# Patient Record
Sex: MALE | Race: WHITE | NOT HISPANIC OR LATINO | Employment: OTHER | URBAN - METROPOLITAN AREA
[De-identification: names, ages, dates, MRNs, and addresses within clinical notes are randomized per-mention and may not be internally consistent; named-entity substitution may affect disease eponyms.]

---

## 2017-01-01 ENCOUNTER — APPOINTMENT (INPATIENT)
Dept: CT IMAGING | Facility: HOSPITAL | Age: 77
DRG: 283 | End: 2017-01-01
Payer: MEDICARE

## 2017-01-01 ENCOUNTER — ALLSCRIPTS OFFICE VISIT (OUTPATIENT)
Dept: OTHER | Facility: OTHER | Age: 77
End: 2017-01-01

## 2017-01-01 ENCOUNTER — GENERIC CONVERSION - ENCOUNTER (OUTPATIENT)
Dept: OTHER | Facility: OTHER | Age: 77
End: 2017-01-01

## 2017-01-01 ENCOUNTER — HOSPITAL ENCOUNTER (INPATIENT)
Facility: HOSPITAL | Age: 77
LOS: 3 days | DRG: 283 | End: 2017-10-17
Attending: EMERGENCY MEDICINE | Admitting: FAMILY MEDICINE
Payer: MEDICARE

## 2017-01-01 ENCOUNTER — APPOINTMENT (OUTPATIENT)
Dept: LAB | Facility: CLINIC | Age: 77
End: 2017-01-01
Payer: MEDICARE

## 2017-01-01 ENCOUNTER — APPOINTMENT (INPATIENT)
Dept: NON INVASIVE DIAGNOSTICS | Facility: HOSPITAL | Age: 77
DRG: 283 | End: 2017-01-01
Payer: MEDICARE

## 2017-01-01 ENCOUNTER — APPOINTMENT (INPATIENT)
Dept: RADIOLOGY | Facility: HOSPITAL | Age: 77
DRG: 283 | End: 2017-01-01
Payer: MEDICARE

## 2017-01-01 ENCOUNTER — APPOINTMENT (INPATIENT)
Dept: NON INVASIVE DIAGNOSTICS | Facility: HOSPITAL | Age: 77
DRG: 280 | End: 2017-01-01
Payer: MEDICARE

## 2017-01-01 ENCOUNTER — APPOINTMENT (EMERGENCY)
Dept: RADIOLOGY | Facility: HOSPITAL | Age: 77
DRG: 283 | End: 2017-01-01
Payer: MEDICARE

## 2017-01-01 ENCOUNTER — TRANSCRIBE ORDERS (OUTPATIENT)
Dept: LAB | Facility: CLINIC | Age: 77
End: 2017-01-01

## 2017-01-01 ENCOUNTER — HOSPITAL ENCOUNTER (INPATIENT)
Facility: HOSPITAL | Age: 77
LOS: 4 days | Discharge: HOME | DRG: 280 | End: 2017-04-17
Attending: EMERGENCY MEDICINE | Admitting: INTERNAL MEDICINE
Payer: MEDICARE

## 2017-01-01 ENCOUNTER — APPOINTMENT (EMERGENCY)
Dept: RADIOLOGY | Facility: HOSPITAL | Age: 77
DRG: 280 | End: 2017-01-01
Payer: MEDICARE

## 2017-01-01 ENCOUNTER — GENERIC CONVERSION - ENCOUNTER (OUTPATIENT)
Dept: NEPHROLOGY | Facility: CLINIC | Age: 77
End: 2017-01-01

## 2017-01-01 ENCOUNTER — APPOINTMENT (EMERGENCY)
Dept: CT IMAGING | Facility: HOSPITAL | Age: 77
DRG: 280 | End: 2017-01-01
Payer: MEDICARE

## 2017-01-01 ENCOUNTER — APPOINTMENT (INPATIENT)
Dept: ULTRASOUND IMAGING | Facility: HOSPITAL | Age: 77
DRG: 283 | End: 2017-01-01
Payer: MEDICARE

## 2017-01-01 ENCOUNTER — APPOINTMENT (INPATIENT)
Dept: INTERVENTIONAL RADIOLOGY/VASCULAR | Facility: HOSPITAL | Age: 77
DRG: 283 | End: 2017-01-01
Attending: SURGERY
Payer: MEDICARE

## 2017-01-01 VITALS
OXYGEN SATURATION: 97 % | DIASTOLIC BLOOD PRESSURE: 71 MMHG | RESPIRATION RATE: 18 BRPM | HEIGHT: 65 IN | WEIGHT: 171.08 LBS | SYSTOLIC BLOOD PRESSURE: 153 MMHG | HEART RATE: 66 BPM | BODY MASS INDEX: 28.5 KG/M2 | TEMPERATURE: 97.8 F

## 2017-01-01 VITALS
HEART RATE: 96 BPM | TEMPERATURE: 98.1 F | SYSTOLIC BLOOD PRESSURE: 107 MMHG | RESPIRATION RATE: 25 BRPM | DIASTOLIC BLOOD PRESSURE: 60 MMHG | WEIGHT: 169.09 LBS | HEIGHT: 65 IN | BODY MASS INDEX: 28.17 KG/M2 | OXYGEN SATURATION: 98 %

## 2017-01-01 DIAGNOSIS — N18.4 CHRONIC KIDNEY DISEASE, STAGE IV (SEVERE) (HCC): ICD-10-CM

## 2017-01-01 DIAGNOSIS — I50.9 ACUTE CONGESTIVE HEART FAILURE (HCC): Primary | ICD-10-CM

## 2017-01-01 DIAGNOSIS — R31.9 HEMATURIA: ICD-10-CM

## 2017-01-01 DIAGNOSIS — R17 TOTAL BILIRUBIN, ELEVATED: ICD-10-CM

## 2017-01-01 DIAGNOSIS — E87.5 HYPERKALEMIA: ICD-10-CM

## 2017-01-01 DIAGNOSIS — Z12.5 SPECIAL SCREENING FOR MALIGNANT NEOPLASM OF PROSTATE: ICD-10-CM

## 2017-01-01 DIAGNOSIS — N18.9 CHRONIC KIDNEY DISEASE: ICD-10-CM

## 2017-01-01 DIAGNOSIS — E78.5 HYPERLIPIDEMIA: ICD-10-CM

## 2017-01-01 DIAGNOSIS — I42.9 CARDIOMYOPATHY (HCC): ICD-10-CM

## 2017-01-01 DIAGNOSIS — N40.0 BPH (BENIGN PROSTATIC HYPERPLASIA): ICD-10-CM

## 2017-01-01 DIAGNOSIS — I47.2 VENTRICULAR TACHYCARDIA (HCC): ICD-10-CM

## 2017-01-01 DIAGNOSIS — E11.49 TYPE 2 DIABETES MELLITUS WITH OTHER DIABETIC NEUROLOGICAL COMPLICATION (HCC): ICD-10-CM

## 2017-01-01 DIAGNOSIS — I25.10 ATHEROSCLEROTIC HEART DISEASE OF NATIVE CORONARY ARTERY WITHOUT ANGINA PECTORIS: ICD-10-CM

## 2017-01-01 DIAGNOSIS — M10.9 GOUT: ICD-10-CM

## 2017-01-01 DIAGNOSIS — I10 ESSENTIAL (PRIMARY) HYPERTENSION: ICD-10-CM

## 2017-01-01 DIAGNOSIS — N17.9 AKI (ACUTE KIDNEY INJURY) (HCC): ICD-10-CM

## 2017-01-01 DIAGNOSIS — R09.02 HYPOXEMIA: ICD-10-CM

## 2017-01-01 DIAGNOSIS — E11.9 TYPE 2 DIABETES MELLITUS WITHOUT COMPLICATIONS (HCC): ICD-10-CM

## 2017-01-01 DIAGNOSIS — Z12.5 SPECIAL SCREENING FOR MALIGNANT NEOPLASM OF PROSTATE: Primary | ICD-10-CM

## 2017-01-01 DIAGNOSIS — N18.9 CKD (CHRONIC KIDNEY DISEASE), UNSPECIFIED STAGE: ICD-10-CM

## 2017-01-01 DIAGNOSIS — R79.89 AZOTEMIA: ICD-10-CM

## 2017-01-01 DIAGNOSIS — R53.1 GENERALIZED WEAKNESS: ICD-10-CM

## 2017-01-01 DIAGNOSIS — I46.9 CARDIAC ARREST (HCC): ICD-10-CM

## 2017-01-01 DIAGNOSIS — I50.9 CHF EXACERBATION (HCC): ICD-10-CM

## 2017-01-01 DIAGNOSIS — R77.8 ELEVATED TROPONIN: ICD-10-CM

## 2017-01-01 DIAGNOSIS — M54.50 LOW BACK PAIN: ICD-10-CM

## 2017-01-01 DIAGNOSIS — I50.33 ACUTE ON CHRONIC DIASTOLIC CHF (CONGESTIVE HEART FAILURE) (HCC): ICD-10-CM

## 2017-01-01 DIAGNOSIS — R74.01 TRANSAMINITIS: ICD-10-CM

## 2017-01-01 DIAGNOSIS — I50.43 ACUTE ON CHRONIC COMBINED SYSTOLIC AND DIASTOLIC CONGESTIVE HEART FAILURE (HCC): Primary | ICD-10-CM

## 2017-01-01 DIAGNOSIS — N18.4 STAGE 4 CHRONIC KIDNEY DISEASE (HCC): ICD-10-CM

## 2017-01-01 LAB
ABO GROUP BLD: NORMAL
ALBUMIN SERPL BCP-MCNC: 3 G/DL (ref 3.5–5)
ALBUMIN SERPL BCP-MCNC: 3.2 G/DL (ref 3.5–5)
ALBUMIN SERPL BCP-MCNC: 4.1 G/DL (ref 3.5–5)
ALP SERPL-CCNC: 145 U/L (ref 46–116)
ALP SERPL-CCNC: 155 U/L (ref 46–116)
ALP SERPL-CCNC: 162 U/L (ref 46–116)
ALT SERPL W P-5'-P-CCNC: 49 U/L (ref 12–78)
ALT SERPL W P-5'-P-CCNC: 53 U/L (ref 12–78)
ALT SERPL W P-5'-P-CCNC: 85 U/L (ref 12–78)
ANION GAP SERPL CALCULATED.3IONS-SCNC: 10 MMOL/L (ref 4–13)
ANION GAP SERPL CALCULATED.3IONS-SCNC: 11 MMOL/L (ref 4–13)
ANION GAP SERPL CALCULATED.3IONS-SCNC: 11 MMOL/L (ref 4–13)
ANION GAP SERPL CALCULATED.3IONS-SCNC: 16 MMOL/L (ref 4–13)
ANION GAP SERPL CALCULATED.3IONS-SCNC: 17 MMOL/L (ref 4–13)
ANION GAP SERPL CALCULATED.3IONS-SCNC: 18 MMOL/L (ref 4–13)
ANION GAP SERPL CALCULATED.3IONS-SCNC: 19 MMOL/L (ref 4–13)
ANION GAP SERPL CALCULATED.3IONS-SCNC: 29 MMOL/L (ref 4–13)
ANION GAP SERPL CALCULATED.3IONS-SCNC: 5 MMOL/L (ref 4–13)
ANION GAP SERPL CALCULATED.3IONS-SCNC: 5 MMOL/L (ref 4–13)
ANION GAP SERPL CALCULATED.3IONS-SCNC: 6 MMOL/L (ref 4–13)
ANION GAP SERPL CALCULATED.3IONS-SCNC: 6 MMOL/L (ref 4–13)
ANION GAP SERPL CALCULATED.3IONS-SCNC: 7 MMOL/L (ref 4–13)
ANION GAP SERPL CALCULATED.3IONS-SCNC: 7 MMOL/L (ref 4–13)
ANION GAP SERPL CALCULATED.3IONS-SCNC: 8 MMOL/L (ref 4–13)
ANION GAP SERPL CALCULATED.3IONS-SCNC: 9 MMOL/L (ref 4–13)
ANION GAP SERPL CALCULATED.3IONS-SCNC: 9 MMOL/L (ref 4–13)
APTT PPP: 29 SECONDS (ref 24–36)
APTT PPP: 30 SECONDS (ref 24–36)
APTT PPP: 85 SECONDS (ref 23–35)
ARTERIAL PATENCY WRIST A: ABNORMAL
ARTERIAL PATENCY WRIST A: NO
AST SERPL W P-5'-P-CCNC: 27 U/L (ref 5–45)
AST SERPL W P-5'-P-CCNC: 29 U/L (ref 5–45)
AST SERPL W P-5'-P-CCNC: 89 U/L (ref 5–45)
ATRIAL RATE: 55 BPM
ATRIAL RATE: 82 BPM
ATRIAL RATE: 82 BPM
ATRIAL RATE: 84 BPM
ATRIAL RATE: 85 BPM
ATRIAL RATE: 91 BPM
ATRIAL RATE: 92 BPM
BACTERIA UR CULT: NORMAL
BACTERIA UR QL AUTO: ABNORMAL /HPF
BACTERIA UR QL AUTO: NORMAL /HPF
BACTERIA UR QL AUTO: NORMAL /HPF
BASE EXCESS BLDA CALC-SCNC: -11 MMOL/L (ref -2–3)
BASE EXCESS BLDA CALC-SCNC: -17.7 MMOL/L
BASE EXCESS BLDA CALC-SCNC: -4 MMOL/L (ref -2–3)
BASE EXCESS BLDA CALC-SCNC: -6.8 MMOL/L
BASE EXCESS BLDA CALC-SCNC: -8.3 MMOL/L
BASOPHILS # BLD AUTO: 0.01 THOUSANDS/ΜL (ref 0–0.1)
BASOPHILS # BLD AUTO: 0.02 THOUSANDS/ΜL (ref 0–0.1)
BASOPHILS # BLD AUTO: 0.03 THOUSANDS/ΜL (ref 0–0.1)
BASOPHILS # BLD AUTO: 0.04 THOUSANDS/ΜL (ref 0–0.1)
BASOPHILS # BLD AUTO: 0.04 THOUSANDS/ΜL (ref 0–0.1)
BASOPHILS # BLD AUTO: 0.06 THOUSANDS/ΜL (ref 0–0.1)
BASOPHILS NFR BLD AUTO: 0 % (ref 0–1)
BASOPHILS NFR BLD AUTO: 1 % (ref 0–1)
BILIRUB DIRECT SERPL-MCNC: 0.13 MG/DL (ref 0–0.2)
BILIRUB DIRECT SERPL-MCNC: 2.04 MG/DL (ref 0–0.2)
BILIRUB SERPL-MCNC: 0.52 MG/DL (ref 0.2–1)
BILIRUB SERPL-MCNC: 0.7 MG/DL (ref 0.2–1)
BILIRUB SERPL-MCNC: 3.1 MG/DL (ref 0.2–1)
BILIRUB UR QL STRIP: NEGATIVE
BLD GP AB SCN SERPL QL: NEGATIVE
BODY TEMPERATURE: 100.4 DEGREES FEHRENHEIT
BUN SERPL-MCNC: 102 MG/DL (ref 5–25)
BUN SERPL-MCNC: 26 MG/DL (ref 5–25)
BUN SERPL-MCNC: 29 MG/DL (ref 5–25)
BUN SERPL-MCNC: 39 MG/DL (ref 5–25)
BUN SERPL-MCNC: 44 MG/DL (ref 5–25)
BUN SERPL-MCNC: 50 MG/DL (ref 5–25)
BUN SERPL-MCNC: 51 MG/DL (ref 5–25)
BUN SERPL-MCNC: 56 MG/DL (ref 5–25)
BUN SERPL-MCNC: 57 MG/DL (ref 5–25)
BUN SERPL-MCNC: 60 MG/DL (ref 5–25)
BUN SERPL-MCNC: 65 MG/DL (ref 5–25)
BUN SERPL-MCNC: 71 MG/DL (ref 5–25)
BUN SERPL-MCNC: 73 MG/DL (ref 5–25)
BUN SERPL-MCNC: 79 MG/DL (ref 5–25)
BUN SERPL-MCNC: 82 MG/DL (ref 5–25)
BUN SERPL-MCNC: 85 MG/DL (ref 5–25)
BUN SERPL-MCNC: 86 MG/DL (ref 5–25)
BUN SERPL-MCNC: 92 MG/DL (ref 5–25)
BUN SERPL-MCNC: 92 MG/DL (ref 5–25)
BUN SERPL-MCNC: 93 MG/DL (ref 5–25)
BUN SERPL-MCNC: 99 MG/DL (ref 5–25)
CA-I BLD-SCNC: 1.05 MMOL/L (ref 1.12–1.32)
CA-I BLD-SCNC: 1.05 MMOL/L (ref 1.12–1.32)
CA-I BLD-SCNC: 1.07 MMOL/L (ref 1.12–1.32)
CA-I BLD-SCNC: 1.16 MMOL/L (ref 1.12–1.32)
CA-I BLD-SCNC: 1.18 MMOL/L (ref 1.12–1.32)
CA-I BLD-SCNC: 1.21 MMOL/L (ref 1.12–1.32)
CALCIUM SERPL-MCNC: 7.9 MG/DL (ref 8.3–10.1)
CALCIUM SERPL-MCNC: 8.1 MG/DL (ref 8.3–10.1)
CALCIUM SERPL-MCNC: 8.3 MG/DL (ref 8.3–10.1)
CALCIUM SERPL-MCNC: 8.4 MG/DL (ref 8.3–10.1)
CALCIUM SERPL-MCNC: 8.5 MG/DL (ref 8.3–10.1)
CALCIUM SERPL-MCNC: 8.6 MG/DL (ref 8.3–10.1)
CALCIUM SERPL-MCNC: 8.6 MG/DL (ref 8.3–10.1)
CALCIUM SERPL-MCNC: 8.7 MG/DL (ref 8.3–10.1)
CALCIUM SERPL-MCNC: 8.7 MG/DL (ref 8.3–10.1)
CALCIUM SERPL-MCNC: 8.9 MG/DL (ref 8.3–10.1)
CALCIUM SERPL-MCNC: 9 MG/DL (ref 8.3–10.1)
CALCIUM SERPL-MCNC: 9.1 MG/DL (ref 8.3–10.1)
CALCIUM SERPL-MCNC: 9.2 MG/DL (ref 8.3–10.1)
CALCIUM SERPL-MCNC: 9.3 MG/DL (ref 8.3–10.1)
CALCIUM SERPL-MCNC: 9.4 MG/DL (ref 8.3–10.1)
CHLORIDE SERPL-SCNC: 100 MMOL/L (ref 100–108)
CHLORIDE SERPL-SCNC: 101 MMOL/L (ref 100–108)
CHLORIDE SERPL-SCNC: 102 MMOL/L (ref 100–108)
CHLORIDE SERPL-SCNC: 103 MMOL/L (ref 100–108)
CHLORIDE SERPL-SCNC: 104 MMOL/L (ref 100–108)
CHLORIDE SERPL-SCNC: 105 MMOL/L (ref 100–108)
CHLORIDE SERPL-SCNC: 106 MMOL/L (ref 100–108)
CHLORIDE SERPL-SCNC: 107 MMOL/L (ref 100–108)
CHLORIDE SERPL-SCNC: 109 MMOL/L (ref 100–108)
CHOLEST SERPL-MCNC: 96 MG/DL (ref 50–200)
CHOLEST SERPL-MCNC: 99 MG/DL (ref 50–200)
CK SERPL-CCNC: 52 U/L (ref 39–308)
CLARITY UR: CLEAR
CO2 SERPL-SCNC: 13 MMOL/L (ref 21–32)
CO2 SERPL-SCNC: 19 MMOL/L (ref 21–32)
CO2 SERPL-SCNC: 19 MMOL/L (ref 21–32)
CO2 SERPL-SCNC: 20 MMOL/L (ref 21–32)
CO2 SERPL-SCNC: 21 MMOL/L (ref 21–32)
CO2 SERPL-SCNC: 21 MMOL/L (ref 21–32)
CO2 SERPL-SCNC: 23 MMOL/L (ref 21–32)
CO2 SERPL-SCNC: 25 MMOL/L (ref 21–32)
CO2 SERPL-SCNC: 26 MMOL/L (ref 21–32)
CO2 SERPL-SCNC: 27 MMOL/L (ref 21–32)
CO2 SERPL-SCNC: 28 MMOL/L (ref 21–32)
CO2 SERPL-SCNC: 29 MMOL/L (ref 21–32)
CO2 SERPL-SCNC: 29 MMOL/L (ref 21–32)
CO2 SERPL-SCNC: 30 MMOL/L (ref 21–32)
CO2 SERPL-SCNC: 30 MMOL/L (ref 21–32)
CO2 SERPL-SCNC: 31 MMOL/L (ref 21–32)
CO2 SERPL-SCNC: 35 MMOL/L (ref 21–32)
CO2 SERPL-SCNC: 35 MMOL/L (ref 21–32)
COLOR UR: YELLOW
CREAT SERPL-MCNC: 2.27 MG/DL (ref 0.6–1.3)
CREAT SERPL-MCNC: 2.43 MG/DL (ref 0.6–1.3)
CREAT SERPL-MCNC: 2.48 MG/DL (ref 0.6–1.3)
CREAT SERPL-MCNC: 2.56 MG/DL (ref 0.6–1.3)
CREAT SERPL-MCNC: 2.59 MG/DL (ref 0.6–1.3)
CREAT SERPL-MCNC: 2.64 MG/DL (ref 0.6–1.3)
CREAT SERPL-MCNC: 2.71 MG/DL (ref 0.6–1.3)
CREAT SERPL-MCNC: 2.75 MG/DL (ref 0.6–1.3)
CREAT SERPL-MCNC: 3.18 MG/DL (ref 0.6–1.3)
CREAT SERPL-MCNC: 3.19 MG/DL (ref 0.6–1.3)
CREAT SERPL-MCNC: 3.31 MG/DL (ref 0.6–1.3)
CREAT SERPL-MCNC: 3.34 MG/DL (ref 0.6–1.3)
CREAT SERPL-MCNC: 3.56 MG/DL (ref 0.6–1.3)
CREAT SERPL-MCNC: 3.58 MG/DL (ref 0.6–1.3)
CREAT SERPL-MCNC: 3.59 MG/DL (ref 0.6–1.3)
CREAT SERPL-MCNC: 3.66 MG/DL (ref 0.6–1.3)
CREAT SERPL-MCNC: 3.82 MG/DL (ref 0.6–1.3)
CREAT SERPL-MCNC: 4.04 MG/DL (ref 0.6–1.3)
CREAT SERPL-MCNC: 4.05 MG/DL (ref 0.6–1.3)
CREAT SERPL-MCNC: 4.08 MG/DL (ref 0.6–1.3)
CREAT SERPL-MCNC: 4.45 MG/DL (ref 0.6–1.3)
CREAT SERPL-MCNC: 4.67 MG/DL (ref 0.6–1.3)
CREAT SERPL-MCNC: 4.69 MG/DL (ref 0.6–1.3)
CREAT UR-MCNC: 102 MG/DL
CREAT UR-MCNC: 121 MG/DL
CREAT UR-MCNC: 124 MG/DL
CREAT UR-MCNC: 46.4 MG/DL
CREAT UR-MCNC: 47.4 MG/DL
CREAT UR-MCNC: 60.6 MG/DL
DS:DELIVERY SYSTEM: ABNORMAL
EOSINOPHIL # BLD AUTO: 0 THOUSAND/ΜL (ref 0–0.61)
EOSINOPHIL # BLD AUTO: 0.02 THOUSAND/ΜL (ref 0–0.61)
EOSINOPHIL # BLD AUTO: 0.03 THOUSAND/ΜL (ref 0–0.61)
EOSINOPHIL # BLD AUTO: 0.05 THOUSAND/ΜL (ref 0–0.61)
EOSINOPHIL # BLD AUTO: 0.17 THOUSAND/ΜL (ref 0–0.61)
EOSINOPHIL # BLD AUTO: 0.19 THOUSAND/ΜL (ref 0–0.61)
EOSINOPHIL # BLD AUTO: 0.22 THOUSAND/ΜL (ref 0–0.61)
EOSINOPHIL # BLD AUTO: 0.26 THOUSAND/ΜL (ref 0–0.61)
EOSINOPHIL NFR BLD AUTO: 0 % (ref 0–6)
EOSINOPHIL NFR BLD AUTO: 2 % (ref 0–6)
EOSINOPHIL NFR BLD AUTO: 2 % (ref 0–6)
EOSINOPHIL NFR BLD AUTO: 4 % (ref 0–6)
EOSINOPHIL NFR BLD AUTO: 4 % (ref 0–6)
ERYTHROCYTE [DISTWIDTH] IN BLOOD BY AUTOMATED COUNT: 12.7 % (ref 11.6–15.1)
ERYTHROCYTE [DISTWIDTH] IN BLOOD BY AUTOMATED COUNT: 12.8 % (ref 11.6–15.1)
ERYTHROCYTE [DISTWIDTH] IN BLOOD BY AUTOMATED COUNT: 12.9 % (ref 11.6–15.1)
ERYTHROCYTE [DISTWIDTH] IN BLOOD BY AUTOMATED COUNT: 13 % (ref 11.6–15.1)
ERYTHROCYTE [DISTWIDTH] IN BLOOD BY AUTOMATED COUNT: 13.1 % (ref 11.6–15.1)
ERYTHROCYTE [DISTWIDTH] IN BLOOD BY AUTOMATED COUNT: 13.3 % (ref 11.6–15.1)
EST. AVERAGE GLUCOSE BLD GHB EST-MCNC: 171 MG/DL
FIO2 GAS DIL.REBREATH: 45 L
GFR SERPL CREATININE-BSD FRML MDRD: 11 ML/MIN/1.73SQ M
GFR SERPL CREATININE-BSD FRML MDRD: 11 ML/MIN/1.73SQ M
GFR SERPL CREATININE-BSD FRML MDRD: 12 ML/MIN/1.73SQ M
GFR SERPL CREATININE-BSD FRML MDRD: 13 ML/MIN/1.73SQ M
GFR SERPL CREATININE-BSD FRML MDRD: 14 ML/MIN/1.73SQ M
GFR SERPL CREATININE-BSD FRML MDRD: 15 ML/MIN/1.73SQ M
GFR SERPL CREATININE-BSD FRML MDRD: 17 ML/MIN/1.73SQ M
GFR SERPL CREATININE-BSD FRML MDRD: 18 ML/MIN/1.73SQ M
GFR SERPL CREATININE-BSD FRML MDRD: 18 ML/MIN/1.73SQ M
GFR SERPL CREATININE-BSD FRML MDRD: 19 ML/MIN/1.73SQ M
GFR SERPL CREATININE-BSD FRML MDRD: 21 ML/MIN/1.73SQ M
GFR SERPL CREATININE-BSD FRML MDRD: 22.9 ML/MIN/1.73SQ M
GFR SERPL CREATININE-BSD FRML MDRD: 23.6 ML/MIN/1.73SQ M
GFR SERPL CREATININE-BSD FRML MDRD: 24 ML/MIN/1.73SQ M
GFR SERPL CREATININE-BSD FRML MDRD: 24.2 ML/MIN/1.73SQ M
GFR SERPL CREATININE-BSD FRML MDRD: 24.5 ML/MIN/1.73SQ M
GFR SERPL CREATININE-BSD FRML MDRD: 26 ML/MIN/1.73SQ M
GFR SERPL CREATININE-BSD FRML MDRD: 28.1 ML/MIN/1.73SQ M
GLUCOSE P FAST SERPL-MCNC: 100 MG/DL (ref 65–99)
GLUCOSE P FAST SERPL-MCNC: 67 MG/DL (ref 65–99)
GLUCOSE P FAST SERPL-MCNC: 91 MG/DL (ref 65–99)
GLUCOSE P FAST SERPL-MCNC: 96 MG/DL (ref 65–99)
GLUCOSE SERPL-MCNC: 101 MG/DL (ref 65–140)
GLUCOSE SERPL-MCNC: 102 MG/DL (ref 65–140)
GLUCOSE SERPL-MCNC: 104 MG/DL (ref 65–140)
GLUCOSE SERPL-MCNC: 106 MG/DL (ref 65–140)
GLUCOSE SERPL-MCNC: 112 MG/DL (ref 65–140)
GLUCOSE SERPL-MCNC: 112 MG/DL (ref 65–140)
GLUCOSE SERPL-MCNC: 113 MG/DL (ref 65–140)
GLUCOSE SERPL-MCNC: 114 MG/DL (ref 65–140)
GLUCOSE SERPL-MCNC: 122 MG/DL (ref 65–140)
GLUCOSE SERPL-MCNC: 128 MG/DL (ref 65–140)
GLUCOSE SERPL-MCNC: 129 MG/DL (ref 65–140)
GLUCOSE SERPL-MCNC: 129 MG/DL (ref 65–140)
GLUCOSE SERPL-MCNC: 134 MG/DL (ref 65–140)
GLUCOSE SERPL-MCNC: 140 MG/DL (ref 65–140)
GLUCOSE SERPL-MCNC: 141 MG/DL (ref 65–140)
GLUCOSE SERPL-MCNC: 143 MG/DL (ref 65–140)
GLUCOSE SERPL-MCNC: 145 MG/DL (ref 65–140)
GLUCOSE SERPL-MCNC: 146 MG/DL (ref 65–140)
GLUCOSE SERPL-MCNC: 149 MG/DL (ref 65–140)
GLUCOSE SERPL-MCNC: 149 MG/DL (ref 65–140)
GLUCOSE SERPL-MCNC: 156 MG/DL (ref 65–140)
GLUCOSE SERPL-MCNC: 165 MG/DL (ref 65–140)
GLUCOSE SERPL-MCNC: 172 MG/DL (ref 65–140)
GLUCOSE SERPL-MCNC: 186 MG/DL (ref 65–140)
GLUCOSE SERPL-MCNC: 196 MG/DL (ref 65–140)
GLUCOSE SERPL-MCNC: 197 MG/DL (ref 65–140)
GLUCOSE SERPL-MCNC: 201 MG/DL (ref 65–140)
GLUCOSE SERPL-MCNC: 203 MG/DL (ref 65–140)
GLUCOSE SERPL-MCNC: 216 MG/DL (ref 65–140)
GLUCOSE SERPL-MCNC: 219 MG/DL (ref 65–140)
GLUCOSE SERPL-MCNC: 225 MG/DL (ref 65–140)
GLUCOSE SERPL-MCNC: 236 MG/DL (ref 65–140)
GLUCOSE SERPL-MCNC: 237 MG/DL (ref 65–140)
GLUCOSE SERPL-MCNC: 255 MG/DL (ref 65–140)
GLUCOSE SERPL-MCNC: 256 MG/DL (ref 65–140)
GLUCOSE SERPL-MCNC: 256 MG/DL (ref 65–140)
GLUCOSE SERPL-MCNC: 258 MG/DL (ref 65–140)
GLUCOSE SERPL-MCNC: 259 MG/DL (ref 65–140)
GLUCOSE SERPL-MCNC: 260 MG/DL (ref 65–140)
GLUCOSE SERPL-MCNC: 261 MG/DL (ref 65–140)
GLUCOSE SERPL-MCNC: 262 MG/DL (ref 65–140)
GLUCOSE SERPL-MCNC: 262 MG/DL (ref 65–140)
GLUCOSE SERPL-MCNC: 280 MG/DL (ref 65–140)
GLUCOSE SERPL-MCNC: 286 MG/DL (ref 65–140)
GLUCOSE SERPL-MCNC: 301 MG/DL (ref 65–140)
GLUCOSE SERPL-MCNC: 32 MG/DL (ref 65–140)
GLUCOSE SERPL-MCNC: 324 MG/DL (ref 65–140)
GLUCOSE SERPL-MCNC: 43 MG/DL (ref 65–140)
GLUCOSE SERPL-MCNC: 58 MG/DL (ref 65–140)
GLUCOSE SERPL-MCNC: 64 MG/DL (ref 65–140)
GLUCOSE SERPL-MCNC: 64 MG/DL (ref 65–140)
GLUCOSE SERPL-MCNC: 71 MG/DL (ref 65–140)
GLUCOSE SERPL-MCNC: 73 MG/DL (ref 65–140)
GLUCOSE SERPL-MCNC: 80 MG/DL (ref 65–140)
GLUCOSE SERPL-MCNC: 83 MG/DL (ref 65–140)
GLUCOSE SERPL-MCNC: 86 MG/DL (ref 65–140)
GLUCOSE SERPL-MCNC: 89 MG/DL (ref 65–140)
GLUCOSE SERPL-MCNC: 93 MG/DL (ref 65–140)
GLUCOSE SERPL-MCNC: 95 MG/DL (ref 65–140)
GLUCOSE UR STRIP-MCNC: ABNORMAL MG/DL
GLUCOSE UR STRIP-MCNC: NEGATIVE MG/DL
HBA1C MFR BLD: 7.6 % (ref 4.2–6.3)
HCO3 BLDA-SCNC: 13.3 MMOL/L (ref 22–28)
HCO3 BLDA-SCNC: 17.1 MMOL/L (ref 22–28)
HCO3 BLDA-SCNC: 17.3 MMOL/L (ref 22–28)
HCO3 BLDA-SCNC: 18.1 MMOL/L (ref 22–28)
HCO3 BLDA-SCNC: 20.5 MMOL/L (ref 22–28)
HCT VFR BLD AUTO: 28.9 % (ref 36.5–49.3)
HCT VFR BLD AUTO: 30.5 % (ref 36.5–49.3)
HCT VFR BLD AUTO: 31.9 % (ref 36.5–49.3)
HCT VFR BLD AUTO: 33.8 % (ref 36.5–49.3)
HCT VFR BLD AUTO: 38.2 % (ref 36.5–49.3)
HCT VFR BLD AUTO: 39.4 % (ref 36.5–49.3)
HCT VFR BLD AUTO: 40.4 % (ref 36.5–49.3)
HCT VFR BLD AUTO: 40.6 % (ref 36.5–49.3)
HCT VFR BLD AUTO: 41.3 % (ref 36.5–49.3)
HCT VFR BLD AUTO: 41.5 % (ref 36.5–49.3)
HCT VFR BLD AUTO: 42.3 % (ref 36.5–49.3)
HCT VFR BLD AUTO: 42.4 % (ref 36.5–49.3)
HCT VFR BLD AUTO: 42.9 % (ref 36.5–49.3)
HCT VFR BLD AUTO: 46.6 % (ref 36.5–49.3)
HCT VFR BLD AUTO: 46.7 % (ref 36.5–49.3)
HCT VFR BLD CALC: 29 % (ref 36.5–49.3)
HCT VFR BLD CALC: 39 % (ref 36.5–49.3)
HDLC SERPL-MCNC: 37 MG/DL (ref 40–60)
HDLC SERPL-MCNC: 45 MG/DL (ref 40–60)
HGB BLD-MCNC: 10.3 G/DL (ref 12–17)
HGB BLD-MCNC: 10.4 G/DL (ref 12–17)
HGB BLD-MCNC: 10.4 G/DL (ref 12–17)
HGB BLD-MCNC: 10.8 G/DL (ref 12–17)
HGB BLD-MCNC: 12.8 G/DL (ref 12–17)
HGB BLD-MCNC: 13 G/DL (ref 12–17)
HGB BLD-MCNC: 13.4 G/DL (ref 12–17)
HGB BLD-MCNC: 13.9 G/DL (ref 12–17)
HGB BLD-MCNC: 13.9 G/DL (ref 12–17)
HGB BLD-MCNC: 14 G/DL (ref 12–17)
HGB BLD-MCNC: 14.1 G/DL (ref 12–17)
HGB BLD-MCNC: 15.6 G/DL (ref 12–17)
HGB BLD-MCNC: 15.6 G/DL (ref 12–17)
HGB BLD-MCNC: 9.4 G/DL (ref 12–17)
HGB BLD-MCNC: 9.6 G/DL (ref 12–17)
HGB BLDA-MCNC: 13.3 G/DL (ref 12–17)
HGB BLDA-MCNC: 9.9 G/DL (ref 12–17)
HGB UR QL STRIP.AUTO: ABNORMAL
HGB UR QL STRIP.AUTO: NEGATIVE
HOLD SPECIMEN: NORMAL
HYALINE CASTS #/AREA URNS LPF: ABNORMAL /LPF
HYALINE CASTS #/AREA URNS LPF: NORMAL /LPF
HYALINE CASTS #/AREA URNS LPF: NORMAL /LPF
INR PPP: 1.09 (ref 0.86–1.16)
INR PPP: 1.22 (ref 0.86–1.16)
INR PPP: 1.58 (ref 0.86–1.16)
KETONES UR STRIP-MCNC: NEGATIVE MG/DL
L PNEUMO1 AG UR QL IA.RAPID: NEGATIVE
LACTATE SERPL-SCNC: 1.7 MMOL/L (ref 0.5–2)
LACTATE SERPL-SCNC: 13.6 MMOL/L (ref 0.5–2)
LACTATE SERPL-SCNC: 2.2 MMOL/L (ref 0.5–2)
LACTATE SERPL-SCNC: 2.4 MMOL/L (ref 0.5–2)
LDLC SERPL CALC-MCNC: 34 MG/DL (ref 0–100)
LDLC SERPL CALC-MCNC: 39 MG/DL (ref 0–100)
LEUKOCYTE ESTERASE UR QL STRIP: ABNORMAL
LEUKOCYTE ESTERASE UR QL STRIP: NEGATIVE
LYMPHOCYTES # BLD AUTO: 0.8 THOUSANDS/ΜL (ref 0.6–4.47)
LYMPHOCYTES # BLD AUTO: 0.84 THOUSANDS/ΜL (ref 0.6–4.47)
LYMPHOCYTES # BLD AUTO: 1.04 THOUSANDS/ΜL (ref 0.6–4.47)
LYMPHOCYTES # BLD AUTO: 1.08 THOUSANDS/ΜL (ref 0.6–4.47)
LYMPHOCYTES # BLD AUTO: 1.12 THOUSANDS/ΜL (ref 0.6–4.47)
LYMPHOCYTES # BLD AUTO: 1.14 THOUSANDS/ΜL (ref 0.6–4.47)
LYMPHOCYTES # BLD AUTO: 1.33 THOUSANDS/ΜL (ref 0.6–4.47)
LYMPHOCYTES # BLD AUTO: 3.96 THOUSANDS/ΜL (ref 0.6–4.47)
LYMPHOCYTES NFR BLD AUTO: 10 % (ref 14–44)
LYMPHOCYTES NFR BLD AUTO: 11 % (ref 14–44)
LYMPHOCYTES NFR BLD AUTO: 12 % (ref 14–44)
LYMPHOCYTES NFR BLD AUTO: 14 % (ref 14–44)
LYMPHOCYTES NFR BLD AUTO: 17 % (ref 14–44)
LYMPHOCYTES NFR BLD AUTO: 26 % (ref 14–44)
LYMPHOCYTES NFR BLD AUTO: 9 % (ref 14–44)
LYMPHOCYTES NFR BLD AUTO: 9 % (ref 14–44)
MAGNESIUM SERPL-MCNC: 1.8 MG/DL (ref 1.6–2.6)
MAGNESIUM SERPL-MCNC: 1.9 MG/DL (ref 1.6–2.6)
MAGNESIUM SERPL-MCNC: 2 MG/DL (ref 1.6–2.6)
MAGNESIUM SERPL-MCNC: 2.1 MG/DL (ref 1.6–2.6)
MAGNESIUM SERPL-MCNC: 2.2 MG/DL (ref 1.6–2.6)
MAGNESIUM SERPL-MCNC: 2.2 MG/DL (ref 1.6–2.6)
MAGNESIUM SERPL-MCNC: 2.4 MG/DL (ref 1.6–2.6)
MAGNESIUM SERPL-MCNC: 2.5 MG/DL (ref 1.6–2.6)
MAGNESIUM SERPL-MCNC: 2.6 MG/DL (ref 1.6–2.6)
MAGNESIUM SERPL-MCNC: 2.6 MG/DL (ref 1.6–2.6)
MAGNESIUM SERPL-MCNC: 2.7 MG/DL (ref 1.6–2.6)
MCH RBC QN AUTO: 32.1 PG (ref 26.8–34.3)
MCH RBC QN AUTO: 32.2 PG (ref 26.8–34.3)
MCH RBC QN AUTO: 32.3 PG (ref 26.8–34.3)
MCH RBC QN AUTO: 32.3 PG (ref 26.8–34.3)
MCH RBC QN AUTO: 32.5 PG (ref 26.8–34.3)
MCH RBC QN AUTO: 32.6 PG (ref 26.8–34.3)
MCH RBC QN AUTO: 32.6 PG (ref 26.8–34.3)
MCH RBC QN AUTO: 32.7 PG (ref 26.8–34.3)
MCH RBC QN AUTO: 32.8 PG (ref 26.8–34.3)
MCH RBC QN AUTO: 32.8 PG (ref 26.8–34.3)
MCH RBC QN AUTO: 33 PG (ref 26.8–34.3)
MCH RBC QN AUTO: 33.1 PG (ref 26.8–34.3)
MCH RBC QN AUTO: 33.1 PG (ref 26.8–34.3)
MCHC RBC AUTO-ENTMCNC: 30.8 G/DL (ref 31.4–37.4)
MCHC RBC AUTO-ENTMCNC: 32 G/DL (ref 31.4–37.4)
MCHC RBC AUTO-ENTMCNC: 32.3 G/DL (ref 31.4–37.4)
MCHC RBC AUTO-ENTMCNC: 32.4 G/DL (ref 31.4–37.4)
MCHC RBC AUTO-ENTMCNC: 32.6 G/DL (ref 31.4–37.4)
MCHC RBC AUTO-ENTMCNC: 32.8 G/DL (ref 31.4–37.4)
MCHC RBC AUTO-ENTMCNC: 33 G/DL (ref 31.4–37.4)
MCHC RBC AUTO-ENTMCNC: 33 G/DL (ref 31.4–37.4)
MCHC RBC AUTO-ENTMCNC: 33.2 G/DL (ref 31.4–37.4)
MCHC RBC AUTO-ENTMCNC: 33.2 G/DL (ref 31.4–37.4)
MCHC RBC AUTO-ENTMCNC: 33.3 G/DL (ref 31.4–37.4)
MCHC RBC AUTO-ENTMCNC: 33.4 G/DL (ref 31.4–37.4)
MCHC RBC AUTO-ENTMCNC: 33.5 G/DL (ref 31.4–37.4)
MCV RBC AUTO: 100 FL (ref 82–98)
MCV RBC AUTO: 101 FL (ref 82–98)
MCV RBC AUTO: 106 FL (ref 82–98)
MCV RBC AUTO: 98 FL (ref 82–98)
MCV RBC AUTO: 99 FL (ref 82–98)
MICROALBUMIN UR-MCNC: 37.6 MG/L (ref 0–20)
MICROALBUMIN/CREAT 24H UR: 37 MG/G CREATININE (ref 0–30)
MONOCYTES # BLD AUTO: 0.74 THOUSAND/ΜL (ref 0.17–1.22)
MONOCYTES # BLD AUTO: 0.88 THOUSAND/ΜL (ref 0.17–1.22)
MONOCYTES # BLD AUTO: 0.95 THOUSAND/ΜL (ref 0.17–1.22)
MONOCYTES # BLD AUTO: 1.04 THOUSAND/ΜL (ref 0.17–1.22)
MONOCYTES # BLD AUTO: 1.08 THOUSAND/ΜL (ref 0.17–1.22)
MONOCYTES # BLD AUTO: 1.3 THOUSAND/ΜL (ref 0.17–1.22)
MONOCYTES # BLD AUTO: 1.32 THOUSAND/ΜL (ref 0.17–1.22)
MONOCYTES # BLD AUTO: 1.5 THOUSAND/ΜL (ref 0.17–1.22)
MONOCYTES NFR BLD AUTO: 10 % (ref 4–12)
MONOCYTES NFR BLD AUTO: 10 % (ref 4–12)
MONOCYTES NFR BLD AUTO: 11 % (ref 4–12)
MONOCYTES NFR BLD AUTO: 12 % (ref 4–12)
MONOCYTES NFR BLD AUTO: 14 % (ref 4–12)
MONOCYTES NFR BLD AUTO: 9 % (ref 4–12)
NEUTROPHILS # BLD AUTO: 4.09 THOUSANDS/ΜL (ref 1.85–7.62)
NEUTROPHILS # BLD AUTO: 4.25 THOUSANDS/ΜL (ref 1.85–7.62)
NEUTROPHILS # BLD AUTO: 7.12 THOUSANDS/ΜL (ref 1.85–7.62)
NEUTROPHILS # BLD AUTO: 7.64 THOUSANDS/ΜL (ref 1.85–7.62)
NEUTROPHILS # BLD AUTO: 8 THOUSANDS/ΜL (ref 1.85–7.62)
NEUTROPHILS # BLD AUTO: 8.82 THOUSANDS/ΜL (ref 1.85–7.62)
NEUTROPHILS # BLD AUTO: 9.35 THOUSANDS/ΜL (ref 1.85–7.62)
NEUTROPHILS # BLD AUTO: 9.8 THOUSANDS/ΜL (ref 1.85–7.62)
NEUTS SEG NFR BLD AUTO: 61 % (ref 43–75)
NEUTS SEG NFR BLD AUTO: 64 % (ref 43–75)
NEUTS SEG NFR BLD AUTO: 70 % (ref 43–75)
NEUTS SEG NFR BLD AUTO: 74 % (ref 43–75)
NEUTS SEG NFR BLD AUTO: 76 % (ref 43–75)
NEUTS SEG NFR BLD AUTO: 79 % (ref 43–75)
NEUTS SEG NFR BLD AUTO: 79 % (ref 43–75)
NEUTS SEG NFR BLD AUTO: 80 % (ref 43–75)
NITRITE UR QL STRIP: NEGATIVE
NON VENT- BIPAP: ABNORMAL
NON-SQ EPI CELLS URNS QL MICRO: ABNORMAL /HPF
NON-SQ EPI CELLS URNS QL MICRO: NORMAL /HPF
NON-SQ EPI CELLS URNS QL MICRO: NORMAL /HPF
NRBC BLD AUTO-RTO: 0 /100 WBCS
NT-PROBNP SERPL-MCNC: 6517 PG/ML
NT-PROBNP SERPL-MCNC: 7446 PG/ML
O2 CT BLDA-SCNC: 15.6 ML/DL (ref 16–23)
O2 CT BLDA-SCNC: 15.6 ML/DL (ref 16–23)
O2 CT BLDA-SCNC: 7.7 ML/DL (ref 16–23)
OXYHGB MFR BLDA: 53.2 % (ref 94–97)
OXYHGB MFR BLDA: 94.6 % (ref 94–97)
OXYHGB MFR BLDA: 94.7 % (ref 94–97)
P AXIS: 66 DEGREES
P AXIS: 68 DEGREES
P AXIS: 79 DEGREES
P AXIS: 79 DEGREES
P AXIS: 90 DEGREES
PCO2 BLD: 20 MMOL/L (ref 21–32)
PCO2 BLD: 22 MMOL/L (ref 21–32)
PCO2 BLD: 34.1 MM HG (ref 36–44)
PCO2 BLD: 56.6 MM HG (ref 36–44)
PCO2 BLDA: 30.4 MM HG (ref 36–44)
PCO2 BLDA: 34.8 MM HG (ref 36–44)
PCO2 BLDA: 56.5 MM HG (ref 36–44)
PH BLD: 7.11 [PH] (ref 7.35–7.45)
PH BLD: 7.39 [PH] (ref 7.35–7.45)
PH BLDA: 6.99 [PH] (ref 7.35–7.45)
PH BLDA: 7.31 [PH] (ref 7.35–7.45)
PH BLDA: 7.37 [PH] (ref 7.35–7.45)
PH UR STRIP.AUTO: 5.5 [PH] (ref 4.5–8)
PH UR STRIP.AUTO: 5.5 [PH] (ref 4.5–8)
PH UR STRIP.AUTO: 6 [PH] (ref 4.5–8)
PH UR STRIP.AUTO: 6.5 [PH] (ref 4.5–8)
PHOSPHATE SERPL-MCNC: 2.6 MG/DL (ref 2.3–4.1)
PHOSPHATE SERPL-MCNC: 3 MG/DL (ref 2.3–4.1)
PHOSPHATE SERPL-MCNC: 3.2 MG/DL (ref 2.3–4.1)
PHOSPHATE SERPL-MCNC: 3.3 MG/DL (ref 2.3–4.1)
PHOSPHATE SERPL-MCNC: 3.4 MG/DL (ref 2.3–4.1)
PHOSPHATE SERPL-MCNC: 3.9 MG/DL (ref 2.3–4.1)
PHOSPHATE SERPL-MCNC: 4.5 MG/DL (ref 2.3–4.1)
PHOSPHATE SERPL-MCNC: 4.7 MG/DL (ref 2.3–4.1)
PHOSPHATE SERPL-MCNC: 6 MG/DL (ref 2.3–4.1)
PHOSPHATE SERPL-MCNC: 6 MG/DL (ref 2.3–4.1)
PLATELET # BLD AUTO: 116 THOUSANDS/UL (ref 149–390)
PLATELET # BLD AUTO: 122 THOUSANDS/UL (ref 149–390)
PLATELET # BLD AUTO: 131 THOUSANDS/UL (ref 149–390)
PLATELET # BLD AUTO: 135 THOUSANDS/UL (ref 149–390)
PLATELET # BLD AUTO: 136 THOUSANDS/UL (ref 149–390)
PLATELET # BLD AUTO: 138 THOUSANDS/UL (ref 149–390)
PLATELET # BLD AUTO: 141 THOUSANDS/UL (ref 149–390)
PLATELET # BLD AUTO: 143 THOUSANDS/UL (ref 149–390)
PLATELET # BLD AUTO: 145 THOUSANDS/UL (ref 149–390)
PLATELET # BLD AUTO: 146 THOUSANDS/UL (ref 149–390)
PLATELET # BLD AUTO: 149 THOUSANDS/UL (ref 149–390)
PLATELET # BLD AUTO: 155 THOUSANDS/UL (ref 149–390)
PLATELET # BLD AUTO: 156 THOUSANDS/UL (ref 149–390)
PLATELET # BLD AUTO: 157 THOUSANDS/UL (ref 149–390)
PLATELET # BLD AUTO: 160 THOUSANDS/UL (ref 149–390)
PLATELET # BLD AUTO: 50 THOUSANDS/UL (ref 149–390)
PLATELET # BLD AUTO: 63 THOUSANDS/UL (ref 149–390)
PMV BLD AUTO: 10.3 FL (ref 8.9–12.7)
PMV BLD AUTO: 10.5 FL (ref 8.9–12.7)
PMV BLD AUTO: 10.5 FL (ref 8.9–12.7)
PMV BLD AUTO: 10.6 FL (ref 8.9–12.7)
PMV BLD AUTO: 10.6 FL (ref 8.9–12.7)
PMV BLD AUTO: 10.8 FL (ref 8.9–12.7)
PMV BLD AUTO: 11 FL (ref 8.9–12.7)
PMV BLD AUTO: 11.1 FL (ref 8.9–12.7)
PMV BLD AUTO: 11.3 FL (ref 8.9–12.7)
PMV BLD AUTO: 11.3 FL (ref 8.9–12.7)
PMV BLD AUTO: 11.4 FL (ref 8.9–12.7)
PMV BLD AUTO: 11.5 FL (ref 8.9–12.7)
PMV BLD AUTO: 11.8 FL (ref 8.9–12.7)
PMV BLD AUTO: 12.2 FL (ref 8.9–12.7)
PMV BLD AUTO: 13 FL (ref 8.9–12.7)
PO2 BLD: 36 MM HG (ref 75–129)
PO2 BLD: 61 MM HG (ref 75–129)
PO2 BLDA: 43.6 MM HG (ref 75–129)
PO2 BLDA: 84.4 MM HG (ref 75–129)
PO2 BLDA: 92.9 MM HG (ref 75–129)
POTASSIUM BLD-SCNC: 3.9 MMOL/L (ref 3.5–5.3)
POTASSIUM BLD-SCNC: 4.7 MMOL/L (ref 3.5–5.3)
POTASSIUM SERPL-SCNC: 3.6 MMOL/L (ref 3.5–5.3)
POTASSIUM SERPL-SCNC: 3.7 MMOL/L (ref 3.5–5.3)
POTASSIUM SERPL-SCNC: 3.8 MMOL/L (ref 3.5–5.3)
POTASSIUM SERPL-SCNC: 3.9 MMOL/L (ref 3.5–5.3)
POTASSIUM SERPL-SCNC: 4 MMOL/L (ref 3.5–5.3)
POTASSIUM SERPL-SCNC: 4.1 MMOL/L (ref 3.5–5.3)
POTASSIUM SERPL-SCNC: 4.2 MMOL/L (ref 3.5–5.3)
POTASSIUM SERPL-SCNC: 4.2 MMOL/L (ref 3.5–5.3)
POTASSIUM SERPL-SCNC: 4.3 MMOL/L (ref 3.5–5.3)
POTASSIUM SERPL-SCNC: 4.3 MMOL/L (ref 3.5–5.3)
POTASSIUM SERPL-SCNC: 4.4 MMOL/L (ref 3.5–5.3)
POTASSIUM SERPL-SCNC: 4.7 MMOL/L (ref 3.5–5.3)
POTASSIUM SERPL-SCNC: 4.7 MMOL/L (ref 3.5–5.3)
POTASSIUM SERPL-SCNC: 4.9 MMOL/L (ref 3.5–5.3)
POTASSIUM SERPL-SCNC: 5.5 MMOL/L (ref 3.5–5.3)
POTASSIUM SERPL-SCNC: 5.8 MMOL/L (ref 3.5–5.3)
PR INTERVAL: 220 MS
PR INTERVAL: 226 MS
PR INTERVAL: 230 MS
PR INTERVAL: 230 MS
PR INTERVAL: 232 MS
PR INTERVAL: 284 MS
PR INTERVAL: 300 MS
PROT SERPL-MCNC: 6.6 G/DL (ref 6.4–8.2)
PROT SERPL-MCNC: 6.7 G/DL (ref 6.4–8.2)
PROT SERPL-MCNC: 6.9 G/DL (ref 6.4–8.2)
PROT UR STRIP-MCNC: ABNORMAL MG/DL
PROT UR STRIP-MCNC: NEGATIVE MG/DL
PROT UR-MCNC: 21 MG/DL
PROT UR-MCNC: 21 MG/DL
PROT UR-MCNC: 29 MG/DL
PROT UR-MCNC: 29 MG/DL
PROT UR-MCNC: 35 MG/DL
PROT/CREAT UR: 0.17 MG/G{CREAT} (ref 0–0.1)
PROT/CREAT UR: 0.29 MG/G{CREAT} (ref 0–0.1)
PROT/CREAT UR: 0.44 MG/G{CREAT} (ref 0–0.1)
PROT/CREAT UR: 0.48 MG/G{CREAT} (ref 0–0.1)
PROT/CREAT UR: 0.63 MG/G{CREAT} (ref 0–0.1)
PROTHROMBIN TIME: 14 SECONDS (ref 12–14.3)
PROTHROMBIN TIME: 15.2 SECONDS (ref 12–14.3)
PROTHROMBIN TIME: 19.2 SECONDS (ref 12.1–14.4)
PROTHROMBIN TIME: 30.2 SECONDS (ref 12.1–14.4)
PSA SERPL-MCNC: 2.5 NG/ML (ref 0–4)
PT 1H NP PPP: 15.1 SEC (ref 12–14.3)
PT IMM NP PPP: 14.9 SECONDS (ref 12–14.3)
PTH-INTACT SERPL-MCNC: 115.6 PG/ML (ref 14–72)
PTH-INTACT SERPL-MCNC: 147 PG/ML (ref 14–72)
PTH-INTACT SERPL-MCNC: 187.9 PG/ML (ref 14–72)
PTH-INTACT SERPL-MCNC: 232 PG/ML (ref 14–72)
PTH-INTACT SERPL-MCNC: 28.5 PG/ML (ref 14–72)
PTH-INTACT SERPL-MCNC: 48.4 PG/ML (ref 14–72)
QRS AXIS: 15 DEGREES
QRS AXIS: 173 DEGREES
QRS AXIS: 52 DEGREES
QRS AXIS: 73 DEGREES
QRS AXIS: 89 DEGREES
QRS AXIS: 92 DEGREES
QRS AXIS: 93 DEGREES
QRSD INTERVAL: 130 MS
QRSD INTERVAL: 130 MS
QRSD INTERVAL: 154 MS
QRSD INTERVAL: 164 MS
QRSD INTERVAL: 178 MS
QRSD INTERVAL: 180 MS
QRSD INTERVAL: 218 MS
QT INTERVAL: 380 MS
QT INTERVAL: 386 MS
QT INTERVAL: 426 MS
QT INTERVAL: 446 MS
QT INTERVAL: 456 MS
QT INTERVAL: 458 MS
QT INTERVAL: 494 MS
QTC INTERVAL: 467 MS
QTC INTERVAL: 477 MS
QTC INTERVAL: 494 MS
QTC INTERVAL: 506 MS
QTC INTERVAL: 527 MS
QTC INTERVAL: 532 MS
QTC INTERVAL: 535 MS
RBC # BLD AUTO: 2.88 MILLION/UL (ref 3.88–5.62)
RBC # BLD AUTO: 2.94 MILLION/UL (ref 3.88–5.62)
RBC # BLD AUTO: 3.2 MILLION/UL (ref 3.88–5.62)
RBC # BLD AUTO: 3.36 MILLION/UL (ref 3.88–5.62)
RBC # BLD AUTO: 3.9 MILLION/UL (ref 3.88–5.62)
RBC # BLD AUTO: 3.94 MILLION/UL (ref 3.88–5.62)
RBC # BLD AUTO: 4.08 MILLION/UL (ref 3.88–5.62)
RBC # BLD AUTO: 4.11 MILLION/UL (ref 3.88–5.62)
RBC # BLD AUTO: 4.12 MILLION/UL (ref 3.88–5.62)
RBC # BLD AUTO: 4.25 MILLION/UL (ref 3.88–5.62)
RBC # BLD AUTO: 4.3 MILLION/UL (ref 3.88–5.62)
RBC # BLD AUTO: 4.31 MILLION/UL (ref 3.88–5.62)
RBC # BLD AUTO: 4.33 MILLION/UL (ref 3.88–5.62)
RBC # BLD AUTO: 4.71 MILLION/UL (ref 3.88–5.62)
RBC # BLD AUTO: 4.72 MILLION/UL (ref 3.88–5.62)
RBC #/AREA URNS AUTO: ABNORMAL /HPF
RBC #/AREA URNS AUTO: NORMAL /HPF
RBC #/AREA URNS AUTO: NORMAL /HPF
RESPIRATORY RATE: 20
RH BLD: NEGATIVE
S PNEUM AG UR QL: NEGATIVE
SAMPLE SITE: ABNORMAL
SAO2 % BLD FROM PO2: 49 % (ref 95–98)
SAO2 % BLD FROM PO2: 91 % (ref 95–98)
SODIUM 24H UR-SCNC: 32 MOL/L
SODIUM BLD-SCNC: 138 MMOL/L (ref 136–145)
SODIUM BLD-SCNC: 146 MMOL/L (ref 136–145)
SODIUM SERPL-SCNC: 137 MMOL/L (ref 136–145)
SODIUM SERPL-SCNC: 138 MMOL/L (ref 136–145)
SODIUM SERPL-SCNC: 138 MMOL/L (ref 136–145)
SODIUM SERPL-SCNC: 139 MMOL/L (ref 136–145)
SODIUM SERPL-SCNC: 140 MMOL/L (ref 136–145)
SODIUM SERPL-SCNC: 141 MMOL/L (ref 136–145)
SODIUM SERPL-SCNC: 142 MMOL/L (ref 136–145)
SODIUM SERPL-SCNC: 142 MMOL/L (ref 136–145)
SODIUM SERPL-SCNC: 143 MMOL/L (ref 136–145)
SODIUM SERPL-SCNC: 143 MMOL/L (ref 136–145)
SODIUM SERPL-SCNC: 144 MMOL/L (ref 136–145)
SODIUM SERPL-SCNC: 148 MMOL/L (ref 136–145)
SP GR UR STRIP.AUTO: 1.01 (ref 1–1.03)
SP GR UR STRIP.AUTO: 1.02 (ref 1–1.03)
SPECIMEN EXPIRATION DATE: NORMAL
SPECIMEN SOURCE: ABNORMAL
T WAVE AXIS: -24 DEGREES
T WAVE AXIS: 119 DEGREES
T WAVE AXIS: 125 DEGREES
T WAVE AXIS: 145 DEGREES
T WAVE AXIS: 17 DEGREES
T WAVE AXIS: 258 DEGREES
T WAVE AXIS: 262 DEGREES
T4 SERPL-MCNC: 9.5 UG/DL (ref 4.7–13.3)
TRIGL SERPL-MCNC: 115 MG/DL
TRIGL SERPL-MCNC: 85 MG/DL
TROPONIN I SERPL-MCNC: 0.49 NG/ML
TROPONIN I SERPL-MCNC: 0.52 NG/ML
TROPONIN I SERPL-MCNC: 0.66 NG/ML
TROPONIN I SERPL-MCNC: 0.67 NG/ML
TROPONIN I SERPL-MCNC: 0.77 NG/ML
TROPONIN I SERPL-MCNC: 0.9 NG/ML
TROPONIN I SERPL-MCNC: 0.93 NG/ML
TROPONIN I SERPL-MCNC: 0.95 NG/ML
TROPONIN I SERPL-MCNC: 1.09 NG/ML
TROPONIN I SERPL-MCNC: 1.18 NG/ML
TROPONIN I SERPL-MCNC: 1.29 NG/ML
TSH SERPL DL<=0.05 MIU/L-ACNC: 1.36 UIU/ML (ref 0.36–3.74)
TSH SERPL DL<=0.05 MIU/L-ACNC: 2.14 UIU/ML (ref 0.36–3.74)
TSH SERPL DL<=0.05 MIU/L-ACNC: 2.5 UIU/ML (ref 0.36–3.74)
URATE SERPL-MCNC: 7.2 MG/DL (ref 4.2–8)
URATE SERPL-MCNC: 8.6 MG/DL (ref 4.2–8)
UROBILINOGEN UR QL STRIP.AUTO: 0.2 E.U./DL
UROBILINOGEN UR QL STRIP.AUTO: 1 E.U./DL
UROBILINOGEN UR QL STRIP.AUTO: 1 E.U./DL
UUN 24H UR-MCNC: 375 MG/DL
VENTRICULAR RATE: 60 BPM
VENTRICULAR RATE: 82 BPM
VENTRICULAR RATE: 82 BPM
VENTRICULAR RATE: 84 BPM
VENTRICULAR RATE: 85 BPM
VENTRICULAR RATE: 91 BPM
VENTRICULAR RATE: 92 BPM
WBC # BLD AUTO: 10.04 THOUSAND/UL (ref 4.31–10.16)
WBC # BLD AUTO: 10.74 THOUSAND/UL (ref 4.31–10.16)
WBC # BLD AUTO: 11.18 THOUSAND/UL (ref 4.31–10.16)
WBC # BLD AUTO: 12.27 THOUSAND/UL (ref 4.31–10.16)
WBC # BLD AUTO: 15.26 THOUSAND/UL (ref 4.31–10.16)
WBC # BLD AUTO: 5.7 THOUSAND/UL (ref 4.31–10.16)
WBC # BLD AUTO: 6.02 THOUSAND/UL (ref 4.31–10.16)
WBC # BLD AUTO: 6.09 THOUSAND/UL (ref 4.31–10.16)
WBC # BLD AUTO: 6.37 THOUSAND/UL (ref 4.31–10.16)
WBC # BLD AUTO: 6.53 THOUSAND/UL (ref 4.31–10.16)
WBC # BLD AUTO: 6.64 THOUSAND/UL (ref 4.31–10.16)
WBC # BLD AUTO: 7.03 THOUSAND/UL (ref 4.31–10.16)
WBC # BLD AUTO: 9.05 THOUSAND/UL (ref 4.31–10.16)
WBC # BLD AUTO: 9.5 THOUSAND/UL (ref 4.31–10.16)
WBC # BLD AUTO: 9.71 THOUSAND/UL (ref 4.31–10.16)
WBC #/AREA URNS AUTO: ABNORMAL /HPF
WBC #/AREA URNS AUTO: NORMAL /HPF
WBC #/AREA URNS AUTO: NORMAL /HPF

## 2017-01-01 PROCEDURE — 80053 COMPREHEN METABOLIC PANEL: CPT

## 2017-01-01 PROCEDURE — 82330 ASSAY OF CALCIUM: CPT

## 2017-01-01 PROCEDURE — 84484 ASSAY OF TROPONIN QUANT: CPT | Performed by: NURSE PRACTITIONER

## 2017-01-01 PROCEDURE — 82948 REAGENT STRIP/BLOOD GLUCOSE: CPT

## 2017-01-01 PROCEDURE — 80048 BASIC METABOLIC PNL TOTAL CA: CPT | Performed by: NURSE PRACTITIONER

## 2017-01-01 PROCEDURE — 85025 COMPLETE CBC W/AUTO DIFF WBC: CPT | Performed by: INTERNAL MEDICINE

## 2017-01-01 PROCEDURE — 83605 ASSAY OF LACTIC ACID: CPT | Performed by: NURSE PRACTITIONER

## 2017-01-01 PROCEDURE — 82570 ASSAY OF URINE CREATININE: CPT

## 2017-01-01 PROCEDURE — 36415 COLL VENOUS BLD VENIPUNCTURE: CPT

## 2017-01-01 PROCEDURE — 05JY3ZZ INSPECTION OF UPPER VEIN, PERCUTANEOUS APPROACH: ICD-10-PCS | Performed by: ANESTHESIOLOGY

## 2017-01-01 PROCEDURE — G8979 MOBILITY GOAL STATUS: HCPCS

## 2017-01-01 PROCEDURE — 83735 ASSAY OF MAGNESIUM: CPT | Performed by: INTERNAL MEDICINE

## 2017-01-01 PROCEDURE — 84300 ASSAY OF URINE SODIUM: CPT | Performed by: INTERNAL MEDICINE

## 2017-01-01 PROCEDURE — 80076 HEPATIC FUNCTION PANEL: CPT | Performed by: NURSE PRACTITIONER

## 2017-01-01 PROCEDURE — 93005 ELECTROCARDIOGRAM TRACING: CPT | Performed by: FAMILY MEDICINE

## 2017-01-01 PROCEDURE — 82805 BLOOD GASES W/O2 SATURATION: CPT | Performed by: NURSE PRACTITIONER

## 2017-01-01 PROCEDURE — 81001 URINALYSIS AUTO W/SCOPE: CPT

## 2017-01-01 PROCEDURE — 83605 ASSAY OF LACTIC ACID: CPT | Performed by: ANESTHESIOLOGY

## 2017-01-01 PROCEDURE — 83735 ASSAY OF MAGNESIUM: CPT | Performed by: NURSE PRACTITIONER

## 2017-01-01 PROCEDURE — 81001 URINALYSIS AUTO W/SCOPE: CPT | Performed by: INTERNAL MEDICINE

## 2017-01-01 PROCEDURE — 97163 PT EVAL HIGH COMPLEX 45 MIN: CPT

## 2017-01-01 PROCEDURE — 90945 DIALYSIS ONE EVALUATION: CPT

## 2017-01-01 PROCEDURE — 47490 INCISION OF GALLBLADDER: CPT

## 2017-01-01 PROCEDURE — 84156 ASSAY OF PROTEIN URINE: CPT

## 2017-01-01 PROCEDURE — 36415 COLL VENOUS BLD VENIPUNCTURE: CPT | Performed by: EMERGENCY MEDICINE

## 2017-01-01 PROCEDURE — 80048 BASIC METABOLIC PNL TOTAL CA: CPT

## 2017-01-01 PROCEDURE — 85025 COMPLETE CBC W/AUTO DIFF WBC: CPT

## 2017-01-01 PROCEDURE — 94640 AIRWAY INHALATION TREATMENT: CPT

## 2017-01-01 PROCEDURE — 5A12012 PERFORMANCE OF CARDIAC OUTPUT, SINGLE, MANUAL: ICD-10-PCS | Performed by: ANESTHESIOLOGY

## 2017-01-01 PROCEDURE — 80076 HEPATIC FUNCTION PANEL: CPT | Performed by: EMERGENCY MEDICINE

## 2017-01-01 PROCEDURE — 83970 ASSAY OF PARATHORMONE: CPT

## 2017-01-01 PROCEDURE — 71010 HB CHEST X-RAY 1 VIEW FRONTAL (PORTABLE): CPT

## 2017-01-01 PROCEDURE — 84484 ASSAY OF TROPONIN QUANT: CPT | Performed by: FAMILY MEDICINE

## 2017-01-01 PROCEDURE — 82947 ASSAY GLUCOSE BLOOD QUANT: CPT

## 2017-01-01 PROCEDURE — 85014 HEMATOCRIT: CPT

## 2017-01-01 PROCEDURE — 93005 ELECTROCARDIOGRAM TRACING: CPT

## 2017-01-01 PROCEDURE — 85027 COMPLETE CBC AUTOMATED: CPT

## 2017-01-01 PROCEDURE — 84550 ASSAY OF BLOOD/URIC ACID: CPT | Performed by: INTERNAL MEDICINE

## 2017-01-01 PROCEDURE — C1769 GUIDE WIRE: HCPCS

## 2017-01-01 PROCEDURE — 94664 DEMO&/EVAL PT USE INHALER: CPT

## 2017-01-01 PROCEDURE — 85025 COMPLETE CBC W/AUTO DIFF WBC: CPT | Performed by: ANESTHESIOLOGY

## 2017-01-01 PROCEDURE — 84443 ASSAY THYROID STIM HORMONE: CPT

## 2017-01-01 PROCEDURE — 85049 AUTOMATED PLATELET COUNT: CPT | Performed by: PHYSICIAN ASSISTANT

## 2017-01-01 PROCEDURE — 82043 UR ALBUMIN QUANTITATIVE: CPT | Performed by: INTERNAL MEDICINE

## 2017-01-01 PROCEDURE — 82803 BLOOD GASES ANY COMBINATION: CPT

## 2017-01-01 PROCEDURE — 81001 URINALYSIS AUTO W/SCOPE: CPT | Performed by: EMERGENCY MEDICINE

## 2017-01-01 PROCEDURE — 84436 ASSAY OF TOTAL THYROXINE: CPT

## 2017-01-01 PROCEDURE — 85730 THROMBOPLASTIN TIME PARTIAL: CPT | Performed by: EMERGENCY MEDICINE

## 2017-01-01 PROCEDURE — 96374 THER/PROPH/DIAG INJ IV PUSH: CPT

## 2017-01-01 PROCEDURE — 84484 ASSAY OF TROPONIN QUANT: CPT | Performed by: PHYSICIAN ASSISTANT

## 2017-01-01 PROCEDURE — 80061 LIPID PANEL: CPT

## 2017-01-01 PROCEDURE — 03HY32Z INSERTION OF MONITORING DEVICE INTO UPPER ARTERY, PERCUTANEOUS APPROACH: ICD-10-PCS | Performed by: ANESTHESIOLOGY

## 2017-01-01 PROCEDURE — 84100 ASSAY OF PHOSPHORUS: CPT | Performed by: INTERNAL MEDICINE

## 2017-01-01 PROCEDURE — 83036 HEMOGLOBIN GLYCOSYLATED A1C: CPT

## 2017-01-01 PROCEDURE — 86900 BLOOD TYPING SEROLOGIC ABO: CPT | Performed by: NURSE PRACTITIONER

## 2017-01-01 PROCEDURE — 82330 ASSAY OF CALCIUM: CPT | Performed by: INTERNAL MEDICINE

## 2017-01-01 PROCEDURE — 93005 ELECTROCARDIOGRAM TRACING: CPT | Performed by: EMERGENCY MEDICINE

## 2017-01-01 PROCEDURE — 86850 RBC ANTIBODY SCREEN: CPT | Performed by: NURSE PRACTITIONER

## 2017-01-01 PROCEDURE — 87449 NOS EACH ORGANISM AG IA: CPT | Performed by: NURSE PRACTITIONER

## 2017-01-01 PROCEDURE — 84540 ASSAY OF URINE/UREA-N: CPT | Performed by: INTERNAL MEDICINE

## 2017-01-01 PROCEDURE — 80061 LIPID PANEL: CPT | Performed by: PHYSICIAN ASSISTANT

## 2017-01-01 PROCEDURE — 84132 ASSAY OF SERUM POTASSIUM: CPT

## 2017-01-01 PROCEDURE — 99152 MOD SED SAME PHYS/QHP 5/>YRS: CPT

## 2017-01-01 PROCEDURE — 85027 COMPLETE CBC AUTOMATED: CPT | Performed by: PHYSICIAN ASSISTANT

## 2017-01-01 PROCEDURE — G8978 MOBILITY CURRENT STATUS: HCPCS

## 2017-01-01 PROCEDURE — 85025 COMPLETE CBC W/AUTO DIFF WBC: CPT | Performed by: EMERGENCY MEDICINE

## 2017-01-01 PROCEDURE — 80048 BASIC METABOLIC PNL TOTAL CA: CPT | Performed by: ANESTHESIOLOGY

## 2017-01-01 PROCEDURE — 84443 ASSAY THYROID STIM HORMONE: CPT | Performed by: EMERGENCY MEDICINE

## 2017-01-01 PROCEDURE — 83735 ASSAY OF MAGNESIUM: CPT | Performed by: FAMILY MEDICINE

## 2017-01-01 PROCEDURE — 84100 ASSAY OF PHOSPHORUS: CPT

## 2017-01-01 PROCEDURE — 99285 EMERGENCY DEPT VISIT HI MDM: CPT

## 2017-01-01 PROCEDURE — 82570 ASSAY OF URINE CREATININE: CPT | Performed by: INTERNAL MEDICINE

## 2017-01-01 PROCEDURE — 85730 THROMBOPLASTIN TIME PARTIAL: CPT | Performed by: PHYSICIAN ASSISTANT

## 2017-01-01 PROCEDURE — 4A133B1 MONITORING OF ARTERIAL PRESSURE, PERIPHERAL, PERCUTANEOUS APPROACH: ICD-10-PCS | Performed by: ANESTHESIOLOGY

## 2017-01-01 PROCEDURE — 80048 BASIC METABOLIC PNL TOTAL CA: CPT | Performed by: INTERNAL MEDICINE

## 2017-01-01 PROCEDURE — C1729 CATH, DRAINAGE: HCPCS

## 2017-01-01 PROCEDURE — 83880 ASSAY OF NATRIURETIC PEPTIDE: CPT | Performed by: EMERGENCY MEDICINE

## 2017-01-01 PROCEDURE — 94660 CPAP INITIATION&MGMT: CPT

## 2017-01-01 PROCEDURE — 74176 CT ABD & PELVIS W/O CONTRAST: CPT

## 2017-01-01 PROCEDURE — 94760 N-INVAS EAR/PLS OXIMETRY 1: CPT

## 2017-01-01 PROCEDURE — 85018 HEMOGLOBIN: CPT | Performed by: NURSE PRACTITIONER

## 2017-01-01 PROCEDURE — 82550 ASSAY OF CK (CPK): CPT

## 2017-01-01 PROCEDURE — 85610 PROTHROMBIN TIME: CPT | Performed by: PHYSICIAN ASSISTANT

## 2017-01-01 PROCEDURE — 36600 WITHDRAWAL OF ARTERIAL BLOOD: CPT

## 2017-01-01 PROCEDURE — 83735 ASSAY OF MAGNESIUM: CPT | Performed by: EMERGENCY MEDICINE

## 2017-01-01 PROCEDURE — 80048 BASIC METABOLIC PNL TOTAL CA: CPT | Performed by: PHYSICIAN ASSISTANT

## 2017-01-01 PROCEDURE — 85730 THROMBOPLASTIN TIME PARTIAL: CPT | Performed by: ANESTHESIOLOGY

## 2017-01-01 PROCEDURE — 36415 COLL VENOUS BLD VENIPUNCTURE: CPT | Performed by: PHYSICIAN ASSISTANT

## 2017-01-01 PROCEDURE — 0F9430Z DRAINAGE OF GALLBLADDER WITH DRAINAGE DEVICE, PERCUTANEOUS APPROACH: ICD-10-PCS | Performed by: RADIOLOGY

## 2017-01-01 PROCEDURE — 87070 CULTURE OTHR SPECIMN AEROBIC: CPT | Performed by: ANESTHESIOLOGY

## 2017-01-01 PROCEDURE — 93306 TTE W/DOPPLER COMPLETE: CPT

## 2017-01-01 PROCEDURE — 97110 THERAPEUTIC EXERCISES: CPT

## 2017-01-01 PROCEDURE — 83605 ASSAY OF LACTIC ACID: CPT | Performed by: PHYSICIAN ASSISTANT

## 2017-01-01 PROCEDURE — 85027 COMPLETE CBC AUTOMATED: CPT | Performed by: NURSE PRACTITIONER

## 2017-01-01 PROCEDURE — 92950 HEART/LUNG RESUSCITATION CPR: CPT

## 2017-01-01 PROCEDURE — 87205 SMEAR GRAM STAIN: CPT | Performed by: ANESTHESIOLOGY

## 2017-01-01 PROCEDURE — 97116 GAIT TRAINING THERAPY: CPT

## 2017-01-01 PROCEDURE — G0103 PSA SCREENING: HCPCS

## 2017-01-01 PROCEDURE — 86901 BLOOD TYPING SEROLOGIC RH(D): CPT | Performed by: NURSE PRACTITIONER

## 2017-01-01 PROCEDURE — 80048 BASIC METABOLIC PNL TOTAL CA: CPT | Performed by: EMERGENCY MEDICINE

## 2017-01-01 PROCEDURE — 85049 AUTOMATED PLATELET COUNT: CPT | Performed by: FAMILY MEDICINE

## 2017-01-01 PROCEDURE — 84295 ASSAY OF SERUM SODIUM: CPT

## 2017-01-01 PROCEDURE — 71020 HB CHEST X-RAY 2VW FRONTAL&LATL: CPT

## 2017-01-01 PROCEDURE — 85611 PROTHROMBIN TEST: CPT | Performed by: ANESTHESIOLOGY

## 2017-01-01 PROCEDURE — 83970 ASSAY OF PARATHORMONE: CPT | Performed by: INTERNAL MEDICINE

## 2017-01-01 PROCEDURE — 0T9B70Z DRAINAGE OF BLADDER WITH DRAINAGE DEVICE, VIA NATURAL OR ARTIFICIAL OPENING: ICD-10-PCS | Performed by: ANESTHESIOLOGY

## 2017-01-01 PROCEDURE — 87086 URINE CULTURE/COLONY COUNT: CPT | Performed by: NURSE PRACTITIONER

## 2017-01-01 PROCEDURE — 96372 THER/PROPH/DIAG INJ SC/IM: CPT

## 2017-01-01 PROCEDURE — 85610 PROTHROMBIN TIME: CPT | Performed by: EMERGENCY MEDICINE

## 2017-01-01 PROCEDURE — 4A133J1 MONITORING OF ARTERIAL PULSE, PERIPHERAL, PERCUTANEOUS APPROACH: ICD-10-PCS | Performed by: ANESTHESIOLOGY

## 2017-01-01 PROCEDURE — 71250 CT THORAX DX C-: CPT

## 2017-01-01 PROCEDURE — 83735 ASSAY OF MAGNESIUM: CPT | Performed by: PHYSICIAN ASSISTANT

## 2017-01-01 PROCEDURE — 0BH18EZ INSERTION OF ENDOTRACHEAL AIRWAY INTO TRACHEA, VIA NATURAL OR ARTIFICIAL OPENING ENDOSCOPIC: ICD-10-PCS | Performed by: ANESTHESIOLOGY

## 2017-01-01 PROCEDURE — 84484 ASSAY OF TROPONIN QUANT: CPT | Performed by: EMERGENCY MEDICINE

## 2017-01-01 PROCEDURE — 87040 BLOOD CULTURE FOR BACTERIA: CPT | Performed by: NURSE PRACTITIONER

## 2017-01-01 PROCEDURE — 84550 ASSAY OF BLOOD/URIC ACID: CPT

## 2017-01-01 PROCEDURE — 76705 ECHO EXAM OF ABDOMEN: CPT

## 2017-01-01 RX ORDER — CARVEDILOL 12.5 MG/1
12.5 TABLET ORAL 2 TIMES DAILY WITH MEALS
Status: DISCONTINUED | OUTPATIENT
Start: 2017-01-01 | End: 2017-01-01

## 2017-01-01 RX ORDER — ALLOPURINOL 100 MG/1
100 TABLET ORAL DAILY
COMMUNITY
Start: 2017-01-01 | End: 2017-01-01 | Stop reason: HOSPADM

## 2017-01-01 RX ORDER — LIDOCAINE HYDROCHLORIDE 10 MG/ML
5 INJECTION, SOLUTION EPIDURAL; INFILTRATION; INTRACAUDAL; PERINEURAL ONCE
Status: COMPLETED | OUTPATIENT
Start: 2017-01-01 | End: 2017-01-01

## 2017-01-01 RX ORDER — COLCHICINE 0.6 MG/1
0.6 TABLET ORAL DAILY
Status: DISCONTINUED | OUTPATIENT
Start: 2017-01-01 | End: 2017-01-01 | Stop reason: HOSPADM

## 2017-01-01 RX ORDER — ACETAMINOPHEN 325 MG/1
650 TABLET ORAL EVERY 6 HOURS PRN
Status: DISCONTINUED | OUTPATIENT
Start: 2017-01-01 | End: 2017-01-01 | Stop reason: HOSPADM

## 2017-01-01 RX ORDER — ATORVASTATIN CALCIUM 40 MG/1
40 TABLET, FILM COATED ORAL
Status: DISCONTINUED | OUTPATIENT
Start: 2017-01-01 | End: 2017-01-01 | Stop reason: HOSPADM

## 2017-01-01 RX ORDER — FUROSEMIDE 10 MG/ML
40 INJECTION INTRAMUSCULAR; INTRAVENOUS 2 TIMES DAILY
Status: DISCONTINUED | OUTPATIENT
Start: 2017-01-01 | End: 2017-01-01

## 2017-01-01 RX ORDER — POLYVINYL ALCOHOL 14 MG/ML
2 SOLUTION/ DROPS OPHTHALMIC
Status: DISCONTINUED | OUTPATIENT
Start: 2017-01-01 | End: 2017-01-01 | Stop reason: HOSPADM

## 2017-01-01 RX ORDER — MIDAZOLAM HYDROCHLORIDE 1 MG/ML
INJECTION INTRAMUSCULAR; INTRAVENOUS CODE/TRAUMA/SEDATION MEDICATION
Status: COMPLETED | OUTPATIENT
Start: 2017-01-01 | End: 2017-01-01

## 2017-01-01 RX ORDER — NITROGLYCERIN 0.4 MG/1
0.4 TABLET SUBLINGUAL
Status: DISCONTINUED | OUTPATIENT
Start: 2017-01-01 | End: 2017-01-01

## 2017-01-01 RX ORDER — MAGNESIUM SULFATE HEPTAHYDRATE 40 MG/ML
2 INJECTION, SOLUTION INTRAVENOUS ONCE
Status: COMPLETED | OUTPATIENT
Start: 2017-01-01 | End: 2017-01-01

## 2017-01-01 RX ORDER — LIDOCAINE HYDROCHLORIDE 10 MG/ML
INJECTION, SOLUTION INFILTRATION; PERINEURAL CODE/TRAUMA/SEDATION MEDICATION
Status: COMPLETED | OUTPATIENT
Start: 2017-01-01 | End: 2017-01-01

## 2017-01-01 RX ORDER — NOREPINEPHRINE BITARTRATE 1 MG/ML
INJECTION, SOLUTION INTRAVENOUS
Status: COMPLETED
Start: 2017-01-01 | End: 2017-01-01

## 2017-01-01 RX ORDER — FUROSEMIDE 10 MG/ML
80 INJECTION INTRAMUSCULAR; INTRAVENOUS ONCE
Status: COMPLETED | OUTPATIENT
Start: 2017-01-01 | End: 2017-01-01

## 2017-01-01 RX ORDER — METOLAZONE 5 MG/1
5 TABLET ORAL ONCE
Status: COMPLETED | OUTPATIENT
Start: 2017-01-01 | End: 2017-01-01

## 2017-01-01 RX ORDER — MORPHINE SULFATE 4 MG/ML
4 INJECTION, SOLUTION INTRAMUSCULAR; INTRAVENOUS ONCE
Status: COMPLETED | OUTPATIENT
Start: 2017-01-01 | End: 2017-01-01

## 2017-01-01 RX ORDER — FUROSEMIDE 10 MG/ML
40 INJECTION INTRAMUSCULAR; INTRAVENOUS ONCE
Status: COMPLETED | OUTPATIENT
Start: 2017-01-01 | End: 2017-01-01

## 2017-01-01 RX ORDER — INSULIN GLARGINE 100 [IU]/ML
20 INJECTION, SOLUTION SUBCUTANEOUS
Status: DISCONTINUED | OUTPATIENT
Start: 2017-01-01 | End: 2017-01-01 | Stop reason: HOSPADM

## 2017-01-01 RX ORDER — HEPARIN SODIUM 5000 [USP'U]/ML
5000 INJECTION, SOLUTION INTRAVENOUS; SUBCUTANEOUS EVERY 8 HOURS SCHEDULED
Status: DISCONTINUED | OUTPATIENT
Start: 2017-01-01 | End: 2017-01-01 | Stop reason: HOSPADM

## 2017-01-01 RX ORDER — INSULIN GLARGINE 100 [IU]/ML
10 INJECTION, SOLUTION SUBCUTANEOUS
Status: DISCONTINUED | OUTPATIENT
Start: 2017-01-01 | End: 2017-01-01

## 2017-01-01 RX ORDER — AMIODARONE HYDROCHLORIDE 200 MG/1
200 TABLET ORAL DAILY
Status: DISCONTINUED | OUTPATIENT
Start: 2017-01-01 | End: 2017-01-01

## 2017-01-01 RX ORDER — OXYCODONE HYDROCHLORIDE 5 MG/1
5 TABLET ORAL EVERY 4 HOURS PRN
Status: DISCONTINUED | OUTPATIENT
Start: 2017-01-01 | End: 2017-01-01 | Stop reason: HOSPADM

## 2017-01-01 RX ORDER — OXYCODONE HYDROCHLORIDE AND ACETAMINOPHEN 5; 325 MG/1; MG/1
1 TABLET ORAL EVERY 6 HOURS PRN
Status: DISCONTINUED | OUTPATIENT
Start: 2017-01-01 | End: 2017-01-01

## 2017-01-01 RX ORDER — FENTANYL CITRATE 50 UG/ML
50 INJECTION, SOLUTION INTRAMUSCULAR; INTRAVENOUS ONCE
Status: COMPLETED | OUTPATIENT
Start: 2017-01-01 | End: 2017-01-01

## 2017-01-01 RX ORDER — DOCUSATE SODIUM 100 MG/1
100 CAPSULE, LIQUID FILLED ORAL 2 TIMES DAILY
Status: DISCONTINUED | OUTPATIENT
Start: 2017-01-01 | End: 2017-01-01 | Stop reason: HOSPADM

## 2017-01-01 RX ORDER — FENTANYL CITRATE 50 UG/ML
INJECTION, SOLUTION INTRAMUSCULAR; INTRAVENOUS
Status: COMPLETED
Start: 2017-01-01 | End: 2017-01-01

## 2017-01-01 RX ORDER — ASPIRIN 81 MG/1
324 TABLET, CHEWABLE ORAL ONCE
Status: COMPLETED | OUTPATIENT
Start: 2017-01-01 | End: 2017-01-01

## 2017-01-01 RX ORDER — CALCITRIOL 0.5 UG/1
CAPSULE, LIQUID FILLED ORAL
COMMUNITY
End: 2017-01-01

## 2017-01-01 RX ORDER — ONDANSETRON 2 MG/ML
4 INJECTION INTRAMUSCULAR; INTRAVENOUS EVERY 6 HOURS PRN
Status: DISCONTINUED | OUTPATIENT
Start: 2017-01-01 | End: 2017-01-01

## 2017-01-01 RX ORDER — ROSUVASTATIN CALCIUM 20 MG/1
10 TABLET, COATED ORAL EVERY OTHER DAY
COMMUNITY

## 2017-01-01 RX ORDER — ALBUMIN (HUMAN) 12.5 G/50ML
SOLUTION INTRAVENOUS
Status: DISPENSED
Start: 2017-01-01 | End: 2017-01-01

## 2017-01-01 RX ORDER — ACETAMINOPHEN 650 MG/1
650 SUPPOSITORY RECTAL EVERY 6 HOURS PRN
Status: DISCONTINUED | OUTPATIENT
Start: 2017-01-01 | End: 2017-01-01 | Stop reason: HOSPADM

## 2017-01-01 RX ORDER — ALBUMIN (HUMAN) 12.5 G/50ML
50 SOLUTION INTRAVENOUS EVERY 6 HOURS
Status: COMPLETED | OUTPATIENT
Start: 2017-01-01 | End: 2017-01-01

## 2017-01-01 RX ORDER — FUROSEMIDE 80 MG
80 TABLET ORAL
Qty: 60 TABLET | Refills: 0 | Status: SHIPPED | OUTPATIENT
Start: 2017-01-01 | End: 2017-01-01 | Stop reason: HOSPADM

## 2017-01-01 RX ORDER — TAMSULOSIN HYDROCHLORIDE 0.4 MG/1
0.4 CAPSULE ORAL
Status: DISCONTINUED | OUTPATIENT
Start: 2017-01-01 | End: 2017-01-01

## 2017-01-01 RX ORDER — CLOPIDOGREL BISULFATE 75 MG/1
75 TABLET ORAL EVERY OTHER DAY
Status: DISCONTINUED | OUTPATIENT
Start: 2017-01-01 | End: 2017-01-01

## 2017-01-01 RX ORDER — PRAVASTATIN SODIUM 40 MG
40 TABLET ORAL
Status: DISCONTINUED | OUTPATIENT
Start: 2017-01-01 | End: 2017-01-01

## 2017-01-01 RX ORDER — ASPIRIN 81 MG/1
81 TABLET ORAL EVERY OTHER DAY
Status: DISCONTINUED | OUTPATIENT
Start: 2017-01-01 | End: 2017-01-01

## 2017-01-01 RX ORDER — OXYCODONE HYDROCHLORIDE AND ACETAMINOPHEN 5; 325 MG/1; MG/1
1 TABLET ORAL EVERY 6 HOURS PRN
COMMUNITY

## 2017-01-01 RX ORDER — CARVEDILOL 12.5 MG/1
25 TABLET ORAL DAILY
Status: DISCONTINUED | OUTPATIENT
Start: 2017-01-01 | End: 2017-01-01

## 2017-01-01 RX ORDER — CLOPIDOGREL BISULFATE 75 MG/1
75 TABLET ORAL EVERY OTHER DAY
Status: DISCONTINUED | OUTPATIENT
Start: 2017-01-01 | End: 2017-01-01 | Stop reason: HOSPADM

## 2017-01-01 RX ORDER — ALBUTEROL SULFATE 2.5 MG/3ML
2.5 SOLUTION RESPIRATORY (INHALATION) EVERY 4 HOURS PRN
Status: DISCONTINUED | OUTPATIENT
Start: 2017-01-01 | End: 2017-01-01

## 2017-01-01 RX ORDER — INSULIN GLARGINE 100 [IU]/ML
30 INJECTION, SOLUTION SUBCUTANEOUS
Status: DISCONTINUED | OUTPATIENT
Start: 2017-01-01 | End: 2017-01-01

## 2017-01-01 RX ORDER — FUROSEMIDE 10 MG/ML
80 INJECTION INTRAMUSCULAR; INTRAVENOUS
Status: DISCONTINUED | OUTPATIENT
Start: 2017-01-01 | End: 2017-01-01

## 2017-01-01 RX ORDER — HEPARIN SODIUM 5000 [USP'U]/ML
5000 INJECTION, SOLUTION INTRAVENOUS; SUBCUTANEOUS EVERY 8 HOURS SCHEDULED
Status: DISCONTINUED | OUTPATIENT
Start: 2017-01-01 | End: 2017-01-01

## 2017-01-01 RX ORDER — AMOXICILLIN 250 MG
1 CAPSULE ORAL
Status: DISCONTINUED | OUTPATIENT
Start: 2017-01-01 | End: 2017-01-01 | Stop reason: HOSPADM

## 2017-01-01 RX ORDER — MORPHINE SULFATE 2 MG/ML
1 INJECTION, SOLUTION INTRAMUSCULAR; INTRAVENOUS EVERY 4 HOURS PRN
Status: DISCONTINUED | OUTPATIENT
Start: 2017-01-01 | End: 2017-01-01 | Stop reason: HOSPADM

## 2017-01-01 RX ORDER — RASAGILINE 1 MG/1
1 TABLET ORAL DAILY
Status: DISCONTINUED | OUTPATIENT
Start: 2017-01-01 | End: 2017-01-01

## 2017-01-01 RX ORDER — ISOSORBIDE MONONITRATE 60 MG/1
90 TABLET, EXTENDED RELEASE ORAL
COMMUNITY
Start: 2017-01-01 | End: 2017-01-01

## 2017-01-01 RX ORDER — AMIODARONE HYDROCHLORIDE 200 MG/1
200 TABLET ORAL DAILY
COMMUNITY
Start: 2016-01-01 | End: 2017-01-01 | Stop reason: HOSPADM

## 2017-01-01 RX ORDER — HALOPERIDOL 5 MG/ML
5 INJECTION INTRAMUSCULAR ONCE
Status: COMPLETED | OUTPATIENT
Start: 2017-01-01 | End: 2017-01-01

## 2017-01-01 RX ORDER — PANTOPRAZOLE SODIUM 40 MG/1
40 TABLET, DELAYED RELEASE ORAL DAILY
Status: DISCONTINUED | OUTPATIENT
Start: 2017-01-01 | End: 2017-01-01

## 2017-01-01 RX ORDER — SODIUM POLYSTYRENE SULFONATE 15 G/60ML
30 SUSPENSION ORAL; RECTAL ONCE
Status: COMPLETED | OUTPATIENT
Start: 2017-01-01 | End: 2017-01-01

## 2017-01-01 RX ORDER — INSULIN GLARGINE 100 [IU]/ML
45 INJECTION, SOLUTION SUBCUTANEOUS EVERY 12 HOURS SCHEDULED
Status: DISCONTINUED | OUTPATIENT
Start: 2017-01-01 | End: 2017-01-01

## 2017-01-01 RX ORDER — ALLOPURINOL 100 MG/1
100 TABLET ORAL DAILY
Status: DISCONTINUED | OUTPATIENT
Start: 2017-01-01 | End: 2017-01-01 | Stop reason: HOSPADM

## 2017-01-01 RX ORDER — INSULIN GLARGINE 100 [IU]/ML
10 INJECTION, SOLUTION SUBCUTANEOUS
Qty: 300 UNITS | Refills: 0
Start: 2017-01-01 | End: 2017-01-01

## 2017-01-01 RX ORDER — ALLOPURINOL 100 MG/1
100 TABLET ORAL DAILY
Status: DISCONTINUED | OUTPATIENT
Start: 2017-01-01 | End: 2017-01-01

## 2017-01-01 RX ORDER — OXYCODONE HYDROCHLORIDE 5 MG/1
2.5 TABLET ORAL EVERY 4 HOURS PRN
Status: DISCONTINUED | OUTPATIENT
Start: 2017-01-01 | End: 2017-01-01 | Stop reason: HOSPADM

## 2017-01-01 RX ORDER — LIDOCAINE 50 MG/G
1 PATCH TOPICAL DAILY
Status: DISCONTINUED | OUTPATIENT
Start: 2017-01-01 | End: 2017-01-01 | Stop reason: HOSPADM

## 2017-01-01 RX ORDER — FENTANYL CITRATE 50 UG/ML
INJECTION, SOLUTION INTRAMUSCULAR; INTRAVENOUS CODE/TRAUMA/SEDATION MEDICATION
Status: COMPLETED | OUTPATIENT
Start: 2017-01-01 | End: 2017-01-01

## 2017-01-01 RX ORDER — POTASSIUM CHLORIDE 14.9 MG/ML
20 INJECTION INTRAVENOUS ONCE
Status: COMPLETED | OUTPATIENT
Start: 2017-01-01 | End: 2017-01-01

## 2017-01-01 RX ORDER — FUROSEMIDE 10 MG/ML
20 SYRINGE (ML) INJECTION CONTINUOUS
Status: DISCONTINUED | OUTPATIENT
Start: 2017-01-01 | End: 2017-01-01

## 2017-01-01 RX ORDER — ERYTHROMYCIN 5 MG/G
0.5 OINTMENT OPHTHALMIC
Status: DISCONTINUED | OUTPATIENT
Start: 2017-01-01 | End: 2017-01-01 | Stop reason: HOSPADM

## 2017-01-01 RX ORDER — ACETAZOLAMIDE 500 MG/5ML
500 INJECTION, POWDER, LYOPHILIZED, FOR SOLUTION INTRAVENOUS ONCE
Status: COMPLETED | OUTPATIENT
Start: 2017-01-01 | End: 2017-01-01

## 2017-01-01 RX ORDER — ALBUMIN, HUMAN INJ 5% 5 %
12.5 SOLUTION INTRAVENOUS ONCE
Status: COMPLETED | OUTPATIENT
Start: 2017-01-01 | End: 2017-01-01

## 2017-01-01 RX ORDER — TAMSULOSIN HYDROCHLORIDE 0.4 MG/1
0.4 CAPSULE ORAL
Status: DISCONTINUED | OUTPATIENT
Start: 2017-01-01 | End: 2017-01-01 | Stop reason: HOSPADM

## 2017-01-01 RX ORDER — PANTOPRAZOLE SODIUM 40 MG/1
40 TABLET, DELAYED RELEASE ORAL
Status: DISCONTINUED | OUTPATIENT
Start: 2017-01-01 | End: 2017-01-01 | Stop reason: HOSPADM

## 2017-01-01 RX ORDER — ALBUMIN (HUMAN) 12.5 G/50ML
50 SOLUTION INTRAVENOUS ONCE
Status: COMPLETED | OUTPATIENT
Start: 2017-01-01 | End: 2017-01-01

## 2017-01-01 RX ORDER — ASPIRIN 81 MG/1
81 TABLET ORAL EVERY OTHER DAY
Status: DISCONTINUED | OUTPATIENT
Start: 2017-01-01 | End: 2017-01-01 | Stop reason: HOSPADM

## 2017-01-01 RX ORDER — CARVEDILOL 25 MG/1
25 TABLET ORAL DAILY
COMMUNITY
Start: 2017-01-01 | End: 2017-01-01 | Stop reason: HOSPADM

## 2017-01-01 RX ORDER — CLOPIDOGREL BISULFATE 75 MG/1
TABLET ORAL EVERY OTHER DAY
COMMUNITY
End: 2017-01-01 | Stop reason: HOSPADM

## 2017-01-01 RX ORDER — PANTOPRAZOLE SODIUM 40 MG/1
TABLET, DELAYED RELEASE ORAL DAILY
COMMUNITY

## 2017-01-01 RX ORDER — LEVALBUTEROL 1.25 MG/.5ML
1.25 SOLUTION, CONCENTRATE RESPIRATORY (INHALATION)
Status: DISCONTINUED | OUTPATIENT
Start: 2017-01-01 | End: 2017-01-01

## 2017-01-01 RX ORDER — RASAGILINE 1 MG/1
TABLET ORAL DAILY
COMMUNITY
Start: 2015-09-28

## 2017-01-01 RX ORDER — FUROSEMIDE 80 MG
40 TABLET ORAL DAILY
COMMUNITY
End: 2017-01-01 | Stop reason: HOSPADM

## 2017-01-01 RX ORDER — DEXTROSE MONOHYDRATE 25 G/50ML
50 INJECTION, SOLUTION INTRAVENOUS AS NEEDED
Status: DISCONTINUED | OUTPATIENT
Start: 2017-01-01 | End: 2017-01-01 | Stop reason: HOSPADM

## 2017-01-01 RX ORDER — FUROSEMIDE 80 MG
80 TABLET ORAL
Status: DISCONTINUED | OUTPATIENT
Start: 2017-01-01 | End: 2017-01-01

## 2017-01-01 RX ORDER — ACETAMINOPHEN 325 MG/1
650 TABLET ORAL EVERY 6 HOURS PRN
Status: DISCONTINUED | OUTPATIENT
Start: 2017-01-01 | End: 2017-01-01

## 2017-01-01 RX ORDER — CALCITRIOL 0.25 UG/1
0.5 CAPSULE, LIQUID FILLED ORAL DAILY
Status: DISCONTINUED | OUTPATIENT
Start: 2017-01-01 | End: 2017-01-01 | Stop reason: HOSPADM

## 2017-01-01 RX ORDER — TAMSULOSIN HYDROCHLORIDE 0.4 MG/1
CAPSULE ORAL
COMMUNITY

## 2017-01-01 RX ORDER — INSULIN GLARGINE 100 [IU]/ML
45 INJECTION, SOLUTION SUBCUTANEOUS
COMMUNITY
End: 2017-01-01 | Stop reason: HOSPADM

## 2017-01-01 RX ORDER — LOSARTAN POTASSIUM 25 MG/1
TABLET ORAL DAILY
COMMUNITY
Start: 2016-01-01

## 2017-01-01 RX ORDER — ONDANSETRON 2 MG/ML
4 INJECTION INTRAMUSCULAR; INTRAVENOUS EVERY 6 HOURS PRN
Status: DISCONTINUED | OUTPATIENT
Start: 2017-01-01 | End: 2017-01-01 | Stop reason: HOSPADM

## 2017-01-01 RX ORDER — ASPIRIN 81 MG/1
TABLET ORAL EVERY OTHER DAY
COMMUNITY
End: 2017-01-01 | Stop reason: HOSPADM

## 2017-01-01 RX ORDER — SELEGILINE HYDROCHLORIDE 5 MG/1
5 TABLET ORAL
Status: DISCONTINUED | OUTPATIENT
Start: 2017-01-01 | End: 2017-01-01 | Stop reason: HOSPADM

## 2017-01-01 RX ORDER — FUROSEMIDE 80 MG
80 TABLET ORAL
Status: DISCONTINUED | OUTPATIENT
Start: 2017-01-01 | End: 2017-01-01 | Stop reason: HOSPADM

## 2017-01-01 RX ORDER — AMIODARONE HYDROCHLORIDE 200 MG/1
200 TABLET ORAL
Status: DISCONTINUED | OUTPATIENT
Start: 2017-01-01 | End: 2017-01-01 | Stop reason: HOSPADM

## 2017-01-01 RX ORDER — METOLAZONE 5 MG/1
5 TABLET ORAL DAILY
Status: DISCONTINUED | OUTPATIENT
Start: 2017-01-01 | End: 2017-01-01

## 2017-01-01 RX ORDER — COLCHICINE 0.6 MG/1
TABLET ORAL
COMMUNITY
End: 2017-01-01

## 2017-01-01 RX ORDER — MILRINONE LACTATE 0.2 MG/ML
0.25 INJECTION, SOLUTION INTRAVENOUS CONTINUOUS
Status: DISCONTINUED | OUTPATIENT
Start: 2017-01-01 | End: 2017-01-01

## 2017-01-01 RX ORDER — CARVEDILOL 12.5 MG/1
25 TABLET ORAL 2 TIMES DAILY WITH MEALS
Status: DISCONTINUED | OUTPATIENT
Start: 2017-01-01 | End: 2017-01-01 | Stop reason: HOSPADM

## 2017-01-01 RX ADMIN — WATER 10 ML: 1 INJECTION INTRAMUSCULAR; INTRAVENOUS; SUBCUTANEOUS at 20:06

## 2017-01-01 RX ADMIN — ATORVASTATIN CALCIUM 40 MG: 40 TABLET, FILM COATED ORAL at 16:19

## 2017-01-01 RX ADMIN — ALLOPURINOL 100 MG: 100 TABLET ORAL at 11:33

## 2017-01-01 RX ADMIN — TAMSULOSIN HYDROCHLORIDE 0.4 MG: 0.4 CAPSULE ORAL at 15:53

## 2017-01-01 RX ADMIN — MILRINONE LACTATE 0.12 MCG/KG/MIN: 200 INJECTION, SOLUTION INTRAVENOUS at 18:03

## 2017-01-01 RX ADMIN — FUROSEMIDE 120 MG: 10 INJECTION, SOLUTION INTRAMUSCULAR; INTRAVENOUS at 19:20

## 2017-01-01 RX ADMIN — POLYVINYL ALCOHOL 2 DROP: 14 SOLUTION/ DROPS OPHTHALMIC at 19:17

## 2017-01-01 RX ADMIN — CARVEDILOL 25 MG: 12.5 TABLET, FILM COATED ORAL at 08:30

## 2017-01-01 RX ADMIN — INSULIN GLARGINE 45 UNITS: 100 INJECTION, SOLUTION SUBCUTANEOUS at 08:29

## 2017-01-01 RX ADMIN — HEPARIN SODIUM 5000 UNITS: 5000 INJECTION, SOLUTION INTRAVENOUS; SUBCUTANEOUS at 21:33

## 2017-01-01 RX ADMIN — FUROSEMIDE 40 MG: 10 INJECTION, SOLUTION INTRAMUSCULAR; INTRAVENOUS at 08:29

## 2017-01-01 RX ADMIN — INSULIN LISPRO 5 UNITS: 100 INJECTION, SOLUTION INTRAVENOUS; SUBCUTANEOUS at 17:36

## 2017-01-01 RX ADMIN — ALLOPURINOL 100 MG: 100 TABLET ORAL at 08:17

## 2017-01-01 RX ADMIN — LEVALBUTEROL HYDROCHLORIDE 1.25 MG: 1.25 SOLUTION, CONCENTRATE RESPIRATORY (INHALATION) at 20:04

## 2017-01-01 RX ADMIN — FUROSEMIDE 80 MG: 80 TABLET ORAL at 17:00

## 2017-01-01 RX ADMIN — ALBUMIN HUMAN 12.5 G: 0.05 INJECTION, SOLUTION INTRAVENOUS at 02:45

## 2017-01-01 RX ADMIN — IPRATROPIUM BROMIDE 0.5 MG: 0.5 SOLUTION RESPIRATORY (INHALATION) at 20:27

## 2017-01-01 RX ADMIN — NOREPINEPHRINE BITARTRATE 17 MCG/MIN: 1 INJECTION INTRAVENOUS at 17:02

## 2017-01-01 RX ADMIN — IPRATROPIUM BROMIDE 0.5 MG: 0.5 SOLUTION RESPIRATORY (INHALATION) at 15:33

## 2017-01-01 RX ADMIN — AMIODARONE HYDROCHLORIDE 200 MG: 200 TABLET ORAL at 08:28

## 2017-01-01 RX ADMIN — ISOSORBIDE MONONITRATE 90 MG: 60 TABLET, EXTENDED RELEASE ORAL at 08:28

## 2017-01-01 RX ADMIN — PANTOPRAZOLE SODIUM 40 MG: 40 TABLET, DELAYED RELEASE ORAL at 11:33

## 2017-01-01 RX ADMIN — COLCHICINE 0.6 MG: 0.6 TABLET, FILM COATED ORAL at 08:29

## 2017-01-01 RX ADMIN — INSULIN LISPRO 3 UNITS: 100 INJECTION, SOLUTION INTRAVENOUS; SUBCUTANEOUS at 12:17

## 2017-01-01 RX ADMIN — INSULIN LISPRO 1 UNITS: 100 INJECTION, SOLUTION INTRAVENOUS; SUBCUTANEOUS at 13:23

## 2017-01-01 RX ADMIN — CARVEDILOL 25 MG: 12.5 TABLET, FILM COATED ORAL at 08:28

## 2017-01-01 RX ADMIN — MORPHINE SULFATE 4 MG: 4 INJECTION, SOLUTION INTRAMUSCULAR; INTRAVENOUS at 20:03

## 2017-01-01 RX ADMIN — ALLOPURINOL 100 MG: 100 TABLET ORAL at 08:28

## 2017-01-01 RX ADMIN — PANTOPRAZOLE SODIUM 40 MG: 40 TABLET, DELAYED RELEASE ORAL at 05:18

## 2017-01-01 RX ADMIN — ASPIRIN 81 MG: 81 TABLET, COATED ORAL at 08:18

## 2017-01-01 RX ADMIN — FUROSEMIDE 80 MG: 80 TABLET ORAL at 08:17

## 2017-01-01 RX ADMIN — FUROSEMIDE 80 MG: 80 TABLET ORAL at 15:53

## 2017-01-01 RX ADMIN — FENTANYL CITRATE 50 MCG: 50 INJECTION INTRAMUSCULAR; INTRAVENOUS at 02:17

## 2017-01-01 RX ADMIN — LIDOCAINE 1 PATCH: 50 PATCH CUTANEOUS at 08:17

## 2017-01-01 RX ADMIN — INSULIN LISPRO 5 UNITS: 100 INJECTION, SOLUTION INTRAVENOUS; SUBCUTANEOUS at 13:07

## 2017-01-01 RX ADMIN — CARVEDILOL 25 MG: 12.5 TABLET, FILM COATED ORAL at 15:53

## 2017-01-01 RX ADMIN — FUROSEMIDE 40 MG: 10 INJECTION, SOLUTION INTRAMUSCULAR; INTRAVENOUS at 16:10

## 2017-01-01 RX ADMIN — ALLOPURINOL 100 MG: 100 TABLET ORAL at 10:09

## 2017-01-01 RX ADMIN — FENTANYL CITRATE 50 MCG: 50 INJECTION, SOLUTION INTRAMUSCULAR; INTRAVENOUS at 02:17

## 2017-01-01 RX ADMIN — AZITHROMYCIN MONOHYDRATE 500 MG: 500 INJECTION, POWDER, LYOPHILIZED, FOR SOLUTION INTRAVENOUS at 11:29

## 2017-01-01 RX ADMIN — TAMSULOSIN HYDROCHLORIDE 0.4 MG: 0.4 CAPSULE ORAL at 17:30

## 2017-01-01 RX ADMIN — LIDOCAINE 1 PATCH: 50 PATCH CUTANEOUS at 08:29

## 2017-01-01 RX ADMIN — NITROGLYCERIN 0.4 MG: 0.4 TABLET SUBLINGUAL at 15:20

## 2017-01-01 RX ADMIN — HEPARIN SODIUM 5000 UNITS: 5000 INJECTION, SOLUTION INTRAVENOUS; SUBCUTANEOUS at 05:21

## 2017-01-01 RX ADMIN — INSULIN LISPRO 2 UNITS: 100 INJECTION, SOLUTION INTRAVENOUS; SUBCUTANEOUS at 17:36

## 2017-01-01 RX ADMIN — VASOPRESSIN 0.03 UNITS/MIN: 20 INJECTION INTRAVENOUS at 05:01

## 2017-01-01 RX ADMIN — ALBUMIN HUMAN 50 G: 0.25 SOLUTION INTRAVENOUS at 05:31

## 2017-01-01 RX ADMIN — INSULIN GLARGINE 30 UNITS: 100 INJECTION, SOLUTION SUBCUTANEOUS at 21:21

## 2017-01-01 RX ADMIN — AMIODARONE HYDROCHLORIDE 200 MG: 200 TABLET ORAL at 08:18

## 2017-01-01 RX ADMIN — ATORVASTATIN CALCIUM 40 MG: 40 TABLET, FILM COATED ORAL at 15:53

## 2017-01-01 RX ADMIN — COLCHICINE 0.6 MG: 0.6 TABLET, FILM COATED ORAL at 08:17

## 2017-01-01 RX ADMIN — HEPARIN SODIUM 5000 UNITS: 5000 INJECTION, SOLUTION INTRAVENOUS; SUBCUTANEOUS at 15:27

## 2017-01-01 RX ADMIN — POTASSIUM CHLORIDE 20 MEQ: 200 INJECTION, SOLUTION INTRAVENOUS at 01:14

## 2017-01-01 RX ADMIN — FUROSEMIDE 80 MG: 10 INJECTION, SOLUTION INTRAMUSCULAR; INTRAVENOUS at 20:47

## 2017-01-01 RX ADMIN — HEPARIN SODIUM 5000 UNITS: 5000 INJECTION, SOLUTION INTRAVENOUS; SUBCUTANEOUS at 15:53

## 2017-01-01 RX ADMIN — CLOPIDOGREL BISULFATE 75 MG: 75 TABLET ORAL at 11:33

## 2017-01-01 RX ADMIN — PANTOPRAZOLE SODIUM 40 MG: 40 TABLET, DELAYED RELEASE ORAL at 06:44

## 2017-01-01 RX ADMIN — FUROSEMIDE 40 MG: 10 INJECTION, SOLUTION INTRAMUSCULAR; INTRAVENOUS at 17:39

## 2017-01-01 RX ADMIN — INSULIN LISPRO 5 UNITS: 100 INJECTION, SOLUTION INTRAVENOUS; SUBCUTANEOUS at 17:23

## 2017-01-01 RX ADMIN — ALBUMIN HUMAN 50 G: 0.25 SOLUTION INTRAVENOUS at 12:48

## 2017-01-01 RX ADMIN — ERYTHROMYCIN 0.5 INCH: 5 OINTMENT OPHTHALMIC at 21:45

## 2017-01-01 RX ADMIN — LEVALBUTEROL HYDROCHLORIDE 1.25 MG: 1.25 SOLUTION, CONCENTRATE RESPIRATORY (INHALATION) at 15:33

## 2017-01-01 RX ADMIN — HALOPERIDOL LACTATE 5 MG: 5 INJECTION, SOLUTION INTRAMUSCULAR at 01:23

## 2017-01-01 RX ADMIN — AMIODARONE HYDROCHLORIDE 200 MG: 200 TABLET ORAL at 08:30

## 2017-01-01 RX ADMIN — Medication 10 MG/HR: at 17:59

## 2017-01-01 RX ADMIN — PIPERACILLIN SODIUM,TAZOBACTAM SODIUM 2.25 G: 2; .25 INJECTION, POWDER, FOR SOLUTION INTRAVENOUS at 10:36

## 2017-01-01 RX ADMIN — INSULIN LISPRO 5 UNITS: 100 INJECTION, SOLUTION INTRAVENOUS; SUBCUTANEOUS at 12:15

## 2017-01-01 RX ADMIN — INSULIN LISPRO 1 UNITS: 100 INJECTION, SOLUTION INTRAVENOUS; SUBCUTANEOUS at 21:23

## 2017-01-01 RX ADMIN — SELEGILINE HYDROCHLORIDE 5 MG: 5 TABLET ORAL at 08:29

## 2017-01-01 RX ADMIN — CALCITRIOL 0.5 MCG: 0.25 CAPSULE ORAL at 08:30

## 2017-01-01 RX ADMIN — PIPERACILLIN SODIUM,TAZOBACTAM SODIUM 2.25 G: 2; .25 INJECTION, POWDER, FOR SOLUTION INTRAVENOUS at 04:09

## 2017-01-01 RX ADMIN — CLOPIDOGREL BISULFATE 75 MG: 75 TABLET ORAL at 08:28

## 2017-01-01 RX ADMIN — PANTOPRAZOLE SODIUM 40 MG: 40 TABLET, DELAYED RELEASE ORAL at 05:21

## 2017-01-01 RX ADMIN — ATORVASTATIN CALCIUM 40 MG: 40 TABLET, FILM COATED ORAL at 17:39

## 2017-01-01 RX ADMIN — HEPARIN SODIUM 5000 UNITS: 5000 INJECTION, SOLUTION INTRAVENOUS; SUBCUTANEOUS at 05:45

## 2017-01-01 RX ADMIN — LIDOCAINE HYDROCHLORIDE 5 ML: 10 INJECTION, SOLUTION EPIDURAL; INFILTRATION; INTRACAUDAL; PERINEURAL at 21:23

## 2017-01-01 RX ADMIN — INSULIN LISPRO 5 UNITS: 100 INJECTION, SOLUTION INTRAVENOUS; SUBCUTANEOUS at 17:21

## 2017-01-01 RX ADMIN — SELEGILINE HYDROCHLORIDE 5 MG: 5 TABLET ORAL at 08:19

## 2017-01-01 RX ADMIN — ALBUMIN HUMAN 50 G: 0.25 SOLUTION INTRAVENOUS at 02:34

## 2017-01-01 RX ADMIN — LIDOCAINE 1 PATCH: 50 PATCH CUTANEOUS at 10:18

## 2017-01-01 RX ADMIN — INSULIN LISPRO 4 UNITS: 100 INJECTION, SOLUTION INTRAVENOUS; SUBCUTANEOUS at 17:21

## 2017-01-01 RX ADMIN — INSULIN LISPRO 5 UNITS: 100 INJECTION, SOLUTION INTRAVENOUS; SUBCUTANEOUS at 13:23

## 2017-01-01 RX ADMIN — METOLAZONE 5 MG: 5 TABLET ORAL at 17:59

## 2017-01-01 RX ADMIN — NOREPINEPHRINE BITARTRATE: 1 INJECTION INTRAVENOUS at 02:57

## 2017-01-01 RX ADMIN — CARVEDILOL 25 MG: 12.5 TABLET, FILM COATED ORAL at 08:18

## 2017-01-01 RX ADMIN — INSULIN LISPRO 1 UNITS: 100 INJECTION, SOLUTION INTRAVENOUS; SUBCUTANEOUS at 17:29

## 2017-01-01 RX ADMIN — ISOSORBIDE MONONITRATE 90 MG: 60 TABLET, EXTENDED RELEASE ORAL at 08:17

## 2017-01-01 RX ADMIN — ALBUMIN HUMAN 50 G: 0.25 SOLUTION INTRAVENOUS at 19:28

## 2017-01-01 RX ADMIN — TAMSULOSIN HYDROCHLORIDE 0.4 MG: 0.4 CAPSULE ORAL at 16:19

## 2017-01-01 RX ADMIN — METOLAZONE 5 MG: 5 TABLET ORAL at 15:56

## 2017-01-01 RX ADMIN — ASPIRIN 81 MG: 81 TABLET, COATED ORAL at 11:33

## 2017-01-01 RX ADMIN — NITROGLYCERIN 1 INCH: 20 OINTMENT TOPICAL at 20:03

## 2017-01-01 RX ADMIN — IPRATROPIUM BROMIDE 0.5 MG: 0.5 SOLUTION RESPIRATORY (INHALATION) at 20:04

## 2017-01-01 RX ADMIN — NOREPINEPHRINE BITARTRATE 5 MCG/MIN: 1 INJECTION INTRAVENOUS at 20:39

## 2017-01-01 RX ADMIN — ALLOPURINOL 100 MG: 100 TABLET ORAL at 08:30

## 2017-01-01 RX ADMIN — INSULIN GLARGINE 20 UNITS: 100 INJECTION, SOLUTION SUBCUTANEOUS at 21:37

## 2017-01-01 RX ADMIN — CALCIUM GLUCONATE 2 G: 94 INJECTION, SOLUTION INTRAVENOUS at 02:16

## 2017-01-01 RX ADMIN — ASPIRIN 81 MG: 81 TABLET, COATED ORAL at 08:30

## 2017-01-01 RX ADMIN — CLOPIDOGREL BISULFATE 75 MG: 75 TABLET ORAL at 10:08

## 2017-01-01 RX ADMIN — VANCOMYCIN HYDROCHLORIDE 1250 MG: 5 INJECTION, POWDER, LYOPHILIZED, FOR SOLUTION INTRAVENOUS at 04:00

## 2017-01-01 RX ADMIN — LEVALBUTEROL HYDROCHLORIDE 1.25 MG: 1.25 SOLUTION, CONCENTRATE RESPIRATORY (INHALATION) at 19:38

## 2017-01-01 RX ADMIN — HEPARIN SODIUM 5000 UNITS: 5000 INJECTION, SOLUTION INTRAVENOUS; SUBCUTANEOUS at 13:11

## 2017-01-01 RX ADMIN — PIPERACILLIN SODIUM,TAZOBACTAM SODIUM 2.25 G: 2; .25 INJECTION, POWDER, FOR SOLUTION INTRAVENOUS at 17:55

## 2017-01-01 RX ADMIN — HEPARIN SODIUM 5000 UNITS: 5000 INJECTION, SOLUTION INTRAVENOUS; SUBCUTANEOUS at 21:37

## 2017-01-01 RX ADMIN — FUROSEMIDE 80 MG: 10 INJECTION, SOLUTION INTRAMUSCULAR; INTRAVENOUS at 20:03

## 2017-01-01 RX ADMIN — HEPARIN SODIUM 5000 UNITS: 5000 INJECTION, SOLUTION INTRAVENOUS; SUBCUTANEOUS at 13:23

## 2017-01-01 RX ADMIN — PRAVASTATIN SODIUM 40 MG: 40 TABLET ORAL at 17:25

## 2017-01-01 RX ADMIN — ALBUMIN HUMAN 50 G: 0.25 SOLUTION INTRAVENOUS at 00:14

## 2017-01-01 RX ADMIN — FENTANYL CITRATE 50 MCG: 50 INJECTION, SOLUTION INTRAMUSCULAR; INTRAVENOUS at 16:31

## 2017-01-01 RX ADMIN — ALBUTEROL SULFATE 2.5 MG: 2.5 SOLUTION RESPIRATORY (INHALATION) at 15:21

## 2017-01-01 RX ADMIN — TAMSULOSIN HYDROCHLORIDE 0.4 MG: 0.4 CAPSULE ORAL at 17:40

## 2017-01-01 RX ADMIN — HEPARIN SODIUM 5000 UNITS: 5000 INJECTION, SOLUTION INTRAVENOUS; SUBCUTANEOUS at 21:21

## 2017-01-01 RX ADMIN — AMIODARONE HYDROCHLORIDE 200 MG: 200 TABLET ORAL at 11:33

## 2017-01-01 RX ADMIN — CALCITRIOL 0.5 MCG: 0.25 CAPSULE ORAL at 08:31

## 2017-01-01 RX ADMIN — INSULIN LISPRO 4 UNITS: 100 INJECTION, SOLUTION INTRAVENOUS; SUBCUTANEOUS at 12:14

## 2017-01-01 RX ADMIN — IPRATROPIUM BROMIDE 0.5 MG: 0.5 SOLUTION RESPIRATORY (INHALATION) at 13:05

## 2017-01-01 RX ADMIN — CARVEDILOL 25 MG: 12.5 TABLET, FILM COATED ORAL at 16:19

## 2017-01-01 RX ADMIN — INSULIN LISPRO 3 UNITS: 100 INJECTION, SOLUTION INTRAVENOUS; SUBCUTANEOUS at 17:40

## 2017-01-01 RX ADMIN — VANCOMYCIN HYDROCHLORIDE 1250 MG: 5 INJECTION, POWDER, LYOPHILIZED, FOR SOLUTION INTRAVENOUS at 04:58

## 2017-01-01 RX ADMIN — INSULIN LISPRO 5 UNITS: 100 INJECTION, SOLUTION INTRAVENOUS; SUBCUTANEOUS at 12:17

## 2017-01-01 RX ADMIN — INSULIN GLARGINE 45 UNITS: 100 INJECTION, SOLUTION SUBCUTANEOUS at 10:13

## 2017-01-01 RX ADMIN — CARVEDILOL 25 MG: 12.5 TABLET, FILM COATED ORAL at 17:39

## 2017-01-01 RX ADMIN — HEPARIN SODIUM 5000 UNITS: 5000 INJECTION, SOLUTION INTRAVENOUS; SUBCUTANEOUS at 10:14

## 2017-01-01 RX ADMIN — FUROSEMIDE 80 MG: 80 TABLET ORAL at 08:28

## 2017-01-01 RX ADMIN — FUROSEMIDE 80 MG: 80 TABLET ORAL at 12:12

## 2017-01-01 RX ADMIN — SODIUM POLYSTYRENE SULFONATE 30 G: 15 SUSPENSION ORAL; RECTAL at 11:34

## 2017-01-01 RX ADMIN — ISOSORBIDE MONONITRATE 90 MG: 60 TABLET, EXTENDED RELEASE ORAL at 08:29

## 2017-01-01 RX ADMIN — HEPARIN SODIUM 5000 UNITS: 5000 INJECTION, SOLUTION INTRAVENOUS; SUBCUTANEOUS at 05:29

## 2017-01-01 RX ADMIN — HEPARIN SODIUM 5000 UNITS: 5000 INJECTION, SOLUTION INTRAVENOUS; SUBCUTANEOUS at 05:18

## 2017-01-01 RX ADMIN — INSULIN GLARGINE 45 UNITS: 100 INJECTION, SOLUTION SUBCUTANEOUS at 21:22

## 2017-01-01 RX ADMIN — COLCHICINE 0.6 MG: 0.6 TABLET, FILM COATED ORAL at 10:11

## 2017-01-01 RX ADMIN — SODIUM CHLORIDE 4 UNITS/HR: 9 INJECTION, SOLUTION INTRAVENOUS at 18:19

## 2017-01-01 RX ADMIN — IPRATROPIUM BROMIDE 0.5 MG: 0.5 SOLUTION RESPIRATORY (INHALATION) at 07:48

## 2017-01-01 RX ADMIN — LEVALBUTEROL HYDROCHLORIDE 1.25 MG: 1.25 SOLUTION, CONCENTRATE RESPIRATORY (INHALATION) at 13:05

## 2017-01-01 RX ADMIN — VASOPRESSIN 0.03 UNITS/MIN: 20 INJECTION INTRAVENOUS at 20:32

## 2017-01-01 RX ADMIN — LEVALBUTEROL HYDROCHLORIDE 1.25 MG: 1.25 SOLUTION, CONCENTRATE RESPIRATORY (INHALATION) at 20:27

## 2017-01-01 RX ADMIN — IPRATROPIUM BROMIDE 0.5 MG: 0.5 SOLUTION RESPIRATORY (INHALATION) at 09:28

## 2017-01-01 RX ADMIN — AZITHROMYCIN MONOHYDRATE 500 MG: 500 INJECTION, POWDER, LYOPHILIZED, FOR SOLUTION INTRAVENOUS at 20:40

## 2017-01-01 RX ADMIN — CARVEDILOL 12.5 MG: 12.5 TABLET, FILM COATED ORAL at 17:30

## 2017-01-01 RX ADMIN — SELEGILINE HYDROCHLORIDE 5 MG: 5 TABLET ORAL at 08:49

## 2017-01-01 RX ADMIN — MIDAZOLAM HYDROCHLORIDE 1 MG: 1 INJECTION, SOLUTION INTRAMUSCULAR; INTRAVENOUS at 16:30

## 2017-01-01 RX ADMIN — HEPARIN SODIUM 5000 UNITS: 5000 INJECTION, SOLUTION INTRAVENOUS; SUBCUTANEOUS at 14:44

## 2017-01-01 RX ADMIN — LEVALBUTEROL HYDROCHLORIDE 1.25 MG: 1.25 SOLUTION, CONCENTRATE RESPIRATORY (INHALATION) at 07:48

## 2017-01-01 RX ADMIN — OXYCODONE HYDROCHLORIDE 2.5 MG: 5 TABLET ORAL at 14:55

## 2017-01-01 RX ADMIN — FUROSEMIDE 40 MG: 10 INJECTION, SOLUTION INTRAMUSCULAR; INTRAVENOUS at 10:21

## 2017-01-01 RX ADMIN — ACETAZOLAMIDE 500 MG: 500 INJECTION, POWDER, LYOPHILIZED, FOR SOLUTION INTRAVENOUS at 20:03

## 2017-01-01 RX ADMIN — HEPARIN SODIUM 5000 UNITS: 5000 INJECTION, SOLUTION INTRAVENOUS; SUBCUTANEOUS at 06:09

## 2017-01-01 RX ADMIN — INSULIN LISPRO 5 UNITS: 100 INJECTION, SOLUTION INTRAVENOUS; SUBCUTANEOUS at 08:20

## 2017-01-01 RX ADMIN — ASPIRIN 81 MG 324 MG: 81 TABLET ORAL at 08:59

## 2017-01-01 RX ADMIN — NOREPINEPHRINE BITARTRATE 5 MCG/MIN: 1 INJECTION INTRAVENOUS at 03:00

## 2017-01-01 RX ADMIN — AMIODARONE HYDROCHLORIDE 200 MG: 200 TABLET ORAL at 06:50

## 2017-01-01 RX ADMIN — HEPARIN SODIUM 5000 UNITS: 5000 INJECTION, SOLUTION INTRAVENOUS; SUBCUTANEOUS at 21:28

## 2017-01-01 RX ADMIN — MAGNESIUM SULFATE HEPTAHYDRATE 2 G: 40 INJECTION, SOLUTION INTRAVENOUS at 02:38

## 2017-01-01 RX ADMIN — FUROSEMIDE 80 MG: 10 INJECTION, SOLUTION INTRAMUSCULAR; INTRAVENOUS at 08:59

## 2017-01-01 RX ADMIN — CARVEDILOL 25 MG: 12.5 TABLET, FILM COATED ORAL at 06:47

## 2017-01-01 RX ADMIN — POTASSIUM CHLORIDE 20 MEQ: 200 INJECTION, SOLUTION INTRAVENOUS at 21:25

## 2017-01-01 RX ADMIN — PHENYLEPHRINE HYDROCHLORIDE 50 MCG/MIN: 10 INJECTION INTRAVENOUS at 00:31

## 2017-01-01 RX ADMIN — INSULIN LISPRO 3 UNITS: 100 INJECTION, SOLUTION INTRAVENOUS; SUBCUTANEOUS at 13:08

## 2017-01-01 RX ADMIN — HYDROMORPHONE HYDROCHLORIDE 0.2 MG: 1 INJECTION, SOLUTION INTRAMUSCULAR; INTRAVENOUS; SUBCUTANEOUS at 00:59

## 2017-01-01 RX ADMIN — LEVALBUTEROL HYDROCHLORIDE 1.25 MG: 1.25 SOLUTION, CONCENTRATE RESPIRATORY (INHALATION) at 09:27

## 2017-01-01 RX ADMIN — ISOSORBIDE MONONITRATE 60 MG: 60 TABLET, EXTENDED RELEASE ORAL at 10:10

## 2017-01-01 RX ADMIN — FUROSEMIDE 80 MG: 80 TABLET ORAL at 16:19

## 2017-01-01 RX ADMIN — LIDOCAINE HYDROCHLORIDE 5 ML: 10 INJECTION, SOLUTION INFILTRATION; PERINEURAL at 16:37

## 2017-01-01 RX ADMIN — Medication 5000 ML: at 17:54

## 2017-01-01 RX ADMIN — IPRATROPIUM BROMIDE 0.5 MG: 0.5 SOLUTION RESPIRATORY (INHALATION) at 19:38

## 2017-01-01 RX ADMIN — PIPERACILLIN SODIUM,TAZOBACTAM SODIUM 2.25 G: 2; .25 INJECTION, POWDER, FOR SOLUTION INTRAVENOUS at 23:14

## 2017-01-01 RX ADMIN — CALCITRIOL 0.5 MCG: 0.25 CAPSULE ORAL at 08:19

## 2017-01-01 RX ADMIN — ERYTHROMYCIN 0.5 INCH: 5 OINTMENT OPHTHALMIC at 21:31

## 2017-01-01 RX ADMIN — CALCITRIOL 0.5 MCG: 0.25 CAPSULE ORAL at 10:07

## 2017-01-01 RX ADMIN — HEPARIN SODIUM 5000 UNITS: 5000 INJECTION, SOLUTION INTRAVENOUS; SUBCUTANEOUS at 21:22

## 2017-01-01 RX ADMIN — SELEGILINE HYDROCHLORIDE 5 MG: 5 TABLET ORAL at 06:48

## 2017-01-01 RX ADMIN — NOREPINEPHRINE BITARTRATE 20 MCG/MIN: 1 INJECTION INTRAVENOUS at 13:35

## 2017-01-01 RX ADMIN — INSULIN LISPRO 12 UNITS: 100 INJECTION, SOLUTION INTRAVENOUS; SUBCUTANEOUS at 13:02

## 2017-01-01 RX ADMIN — PANTOPRAZOLE SODIUM 40 MG: 40 TABLET, DELAYED RELEASE ORAL at 06:09

## 2017-01-01 RX ADMIN — Medication 10 MG/HR: at 20:51

## 2017-01-01 RX ADMIN — ALBUTEROL SULFATE 2.5 MG: 2.5 SOLUTION RESPIRATORY (INHALATION) at 17:14

## 2017-01-01 RX ADMIN — CEFTRIAXONE 1000 MG: 1 INJECTION, POWDER, FOR SOLUTION INTRAMUSCULAR; INTRAVENOUS at 20:16

## 2017-01-01 RX ADMIN — PIPERACILLIN SODIUM,TAZOBACTAM SODIUM 2.25 G: 2; .25 INJECTION, POWDER, FOR SOLUTION INTRAVENOUS at 04:00

## 2017-04-13 PROBLEM — N18.9 CKD (CHRONIC KIDNEY DISEASE): Status: ACTIVE | Noted: 2017-01-01

## 2017-04-13 PROBLEM — D69.6 THROMBOCYTOPENIA (HCC): Status: ACTIVE | Noted: 2017-01-01

## 2017-04-13 PROBLEM — E87.5 HYPERKALEMIA: Status: ACTIVE | Noted: 2017-01-01

## 2017-04-13 PROBLEM — E11.65 DIABETES MELLITUS WITH HYPERGLYCEMIA, WITH LONG-TERM CURRENT USE OF INSULIN (HCC): Status: ACTIVE | Noted: 2017-01-01

## 2017-04-13 PROBLEM — Z79.4 DIABETES MELLITUS WITH HYPERGLYCEMIA, WITH LONG-TERM CURRENT USE OF INSULIN (HCC): Status: ACTIVE | Noted: 2017-01-01

## 2017-04-13 PROBLEM — M54.50 CHRONIC LOW BACK PAIN WITHOUT SCIATICA: Status: ACTIVE | Noted: 2017-01-01

## 2017-04-13 PROBLEM — I10 ESSENTIAL HYPERTENSION: Status: ACTIVE | Noted: 2017-01-01

## 2017-04-13 PROBLEM — Z95.810 CARDIAC DEFIBRILLATOR IN PLACE: Status: ACTIVE | Noted: 2017-01-01

## 2017-04-13 PROBLEM — I50.9 CHF EXACERBATION (HCC): Status: ACTIVE | Noted: 2017-01-01

## 2017-04-13 PROBLEM — G89.29 CHRONIC LOW BACK PAIN WITHOUT SCIATICA: Status: ACTIVE | Noted: 2017-01-01

## 2017-04-17 PROBLEM — E87.5 HYPERKALEMIA: Status: RESOLVED | Noted: 2017-01-01 | Resolved: 2017-01-01

## 2017-04-17 PROBLEM — N25.81 SECONDARY HYPERPARATHYROIDISM OF RENAL ORIGIN (HCC): Status: ACTIVE | Noted: 2017-01-01

## 2017-04-17 PROBLEM — I12.9 HYPERTENSIVE CKD (CHRONIC KIDNEY DISEASE): Status: ACTIVE | Noted: 2017-01-01

## 2017-04-17 PROBLEM — N18.4 STAGE 4 CHRONIC KIDNEY DISEASE (HCC): Status: ACTIVE | Noted: 2017-01-01

## 2017-04-17 PROBLEM — N18.9 CKD (CHRONIC KIDNEY DISEASE): Status: RESOLVED | Noted: 2017-01-01 | Resolved: 2017-01-01

## 2017-10-14 PROBLEM — N17.9 AKI (ACUTE KIDNEY INJURY) (HCC): Status: ACTIVE | Noted: 2017-01-01

## 2017-10-14 PROBLEM — G20 PARKINSON DISEASE (HCC): Status: ACTIVE | Noted: 2017-01-01

## 2017-10-14 PROBLEM — I50.33 ACUTE ON CHRONIC DIASTOLIC CHF (CONGESTIVE HEART FAILURE) (HCC): Status: ACTIVE | Noted: 2017-01-01

## 2017-10-14 PROBLEM — I12.9 HYPERTENSIVE CKD (CHRONIC KIDNEY DISEASE): Status: ACTIVE | Noted: 2017-01-01

## 2017-10-14 PROBLEM — R77.8 ELEVATED TROPONIN: Status: ACTIVE | Noted: 2017-01-01

## 2017-10-14 NOTE — PROGRESS NOTES
Transfer/Accept - Critical Care   Helen Thurman 65 y o  male MRN: [id number]  Unit/Bed#: -01 Encounter: [id number]    Reason for Consultation / Chief Complaint: Respiratory distress    History of Present Illness:  Helen Thurman is a 65 y o  male who presents on 10/14 with a 1 day history of shortness of breath  He has a past medical history of systolic heart failure, cardiomyopathy status post ICD, history of coronary artery disease status post CABG, chronic kidney disease stage 4, diabetes mellitus and a Parkinson's  He was initially admitted to the medical-surgical floor given 80 mg of intravenous Lasix  Per nursing report he may have had 1-2 episodes of voiding however has had negative bladder scans  At approximately 1900 he had an acute change with worsening shortness of breath and significant accessory muscle usage  His oxygen needs have increased in his saturation was 92% on 6 L  he will be transferred to the step-down unit for noninvasive positive-pressure ventilation as well as initiation of Lasix infusion  History obtained from chart review and unobtainable from patient due to severity of condition      Past Medical History:  Past Medical History:   Diagnosis Date    Diabetes mellitus ([facility] )     Gout     History of MI (myocardial infarction)     x3    Hyperlipidemia     Hypertension     Parkinson disease ([facility] )     Renal disorder        Past Surgical History:  Past Surgical History:   Procedure Laterality Date    APPENDECTOMY      CARDIAC PACEMAKER PLACEMENT      CORONARY ARTERY BYPASS GRAFT      TONSILLECTOMY         Past Family History:  Family History   Problem Relation Age of Onset    Family history unknown: Yes       Social History:  History   Smoking Status    Never Smoker   Smokeless Tobacco    Never Used     History   Alcohol Use No     History   Drug Use No     Marital Status: /Civil Union  Exercise History:  Independent in ADLs    Home Medications:   Prior to Admission medications    Medication Sig Start Date End Date Taking?  Authorizing Provider   oxyCODONE-acetaminophen (PERCOCET) 5-325 mg per tablet Take 1 tablet by mouth every 6 (six) hours as needed for moderate pain   Yes Historical Provider, MD   allopurinol (ZYLOPRIM) 100 mg tablet Take 100 mg by mouth daily   2/15/17   Historical Provider, MD   amiodarone 200 mg tablet Take 200 mg by mouth daily   11/14/16   Historical Provider, MD   aspirin (ECOTRIN LOW STRENGTH) 81 mg EC tablet Take by mouth every other day    Historical Provider, MD   carvedilol (COREG) 25 mg tablet Take 25 mg by mouth daily   2/15/17   Historical Provider, MD   clopidogrel (PLAVIX) 75 mg tablet Take by mouth every other day      Historical Provider, MD   furosemide (LASIX) 80 mg tablet Take 1 tablet by mouth 2 (two) times a day for 30 days  Patient taking differently: Take 80 mg by mouth daily   4/17/17 5/17/17  Manuel Mosley,    insulin glargine (LANTUS) 100 units/mL subcutaneous injection Inject 10 Units under the skin daily at bedtime for 30 days 4/17/17 5/17/17  Manuel Mosley, DO   insulin lispro (HumaLOG) 100 units/mL injection Inject 5 Units under the skin 3 (three) times a day with meals for 30 days 4/17/17 5/17/17  Manuel Mosley DO   losartan (COZAAR) 25 mg tablet Take by mouth daily   11/28/16   Historical Provider, MD   pantoprazole (PROTONIX) 40 mg tablet Take by mouth daily      Historical Provider, MD   rasagiline (AZILECT) 1 MG Take by mouth daily   9/28/15   Historical Provider, MD   rosuvastatin (CRESTOR) 20 MG tablet Take 10 mg by mouth every other day      Historical Provider, MD   tamsulosin (FLOMAX) 0 4 mg Take by mouth    Historical Provider, MD   calcitriol (ROCALTROL) 0 5 MCG capsule Take by mouth  10/14/17  Historical Provider, MD   colchicine (COLCRYS) 0 6 mg tablet Take by mouth  10/14/17  Historical Provider, MD   isosorbide mononitrate (IMDUR) 60 mg 24 hr tablet Take 90 mg by mouth 4/4/17 10/14/17  Historical Provider, MD       Inpatient Medications:  Scheduled Meds:  allopurinol 100 mg Oral Daily   amiodarone 200 mg Oral Daily   aspirin 81 mg Oral Every Other Day   carvedilol 12 5 mg Oral BID With Meals   clopidogrel 75 mg Oral Every Other Day   furosemide 80 mg Oral BID (diuretic)   heparin (porcine) 5,000 Units Subcutaneous Q8H [facility]   insulin glargine 10 Units Subcutaneous HS   insulin lispro 1-5 Units Subcutaneous TID AC   insulin lispro 5 Units Subcutaneous TID With Meals   ipratropium 0 5 mg Nebulization TID   levalbuterol 1 25 mg Nebulization TID   pantoprazole 40 mg Oral Daily   pravastatin 40 mg Oral Daily With Dinner   rasagiline 1 mg Oral Daily   tamsulosin 0 4 mg Oral Daily With Dinner     Continuous Infusions:   PRN Meds:    acetaminophen 650 mg Q6H PRN   albuterol 2 5 mg Q4H PRN   influenza vaccine 0 5 mL Prior to discharge   nitroglycerin 0 4 mg Q5 Min PRN   oxyCODONE-acetaminophen 1 tablet Q6H PRN       Allergies:  No Known Allergies    ROS:   Review of Systems   Constitutional: Positive for diaphoresis  HENT: Negative  Respiratory: Positive for cough and shortness of breath  Negative for chest tightness  Cardiovascular: Negative for chest pain, palpitations and leg swelling  Gastrointestinal: Positive for abdominal pain  Endocrine: Negative  Genitourinary: Negative  Musculoskeletal: Negative  Skin: Negative  Allergic/Immunologic: Negative  Neurological: Positive for light-headedness  Hematological: Negative  Psychiatric/Behavioral: Negative          Vitals:  Vitals:    10/14/17 1532 10/14/17 1716 10/14/17 1900 10/14/17 1938   BP: 107/51  118/57    Pulse: 76  81    Resp: 18  20    Temp: 98 9 °F (37 2 °C)  98 9 °F (37 2 °C)    TempSrc: Oral  Oral    SpO2: 96% 93% 91% 92%   Weight:       Height:         Temperature:   Temp (24hrs), Av 6 °F (37 °C), Min:97 9 °F (36 6 °C), Max:98 9 °F (37 2 °C)    Current Temperature: 98 9 °F (37 2 °C)    Weights:   IBW: 61 5 kg  Body mass index is 25 97 kg/m²  Hemodynamic Monitoring:  N/A     Non-Invasive/Invasive Ventilation Settings:  Respiratory    Lab Data (Last 4 hours)    None         O2/Vent Data (Last 4 hours)    None              No results found for: PHART, MDM0BUS, PO2ART, NGP5BWE, Y1SZYGIO, BEART, SOURCE  SpO2: SpO2: 92 %, SpO2 Activity: SpO2 Activity: At Rest, SpO2 Device: O2 Device: Nasal cannula     Physical Exam:  Physical Exam   Constitutional: He is oriented to person, place, and time  He appears ill  He appears distressed  HENT:   Head: Normocephalic and atraumatic  Eyes: Conjunctivae are normal  Pupils are equal, round, and reactive to light  Neck: Normal range of motion  JVD present  No tracheal deviation present  Cardiovascular: Normal rate and regular rhythm  Pulses:       Radial pulses are 2+ on the right side, and 2+ on the left side  Pulmonary/Chest: He is in respiratory distress  He has wheezes  He has rales  He exhibits no tenderness  Abdominal: Soft  Bowel sounds are normal  He exhibits no distension  There is tenderness in the suprapubic area  There is no rebound and no guarding  Musculoskeletal: Normal range of motion  He exhibits no edema, tenderness or deformity  Neurological: He is alert and oriented to person, place, and time  He has normal strength  No cranial nerve deficit or sensory deficit  GCS eye subscore is 3  GCS verbal subscore is 5  GCS motor subscore is 6  Skin: Skin is warm  He is diaphoretic  No erythema         Labs:    Results from last 7 days  Lab Units 10/14/17  1932 10/14/17  1206 10/14/17  0753   WBC Thousand/uL  --   --  11 18*   HEMOGLOBIN g/dL  --   --  13 4   I STAT HEMOGLOBIN g/dl 13 3  --   --    HEMATOCRIT %  --   --  41 3   PLATELETS Thousands/uL  --  145* 155   NEUTROS PCT %  --   --  79*   MONOS PCT %  --   --  9      Results from last 7 days  Lab Units 10/14/17  1932 10/14/17  0753   SODIUM mmol/L  --  137   POTASSIUM mmol/L  --  5 8*   CHLORIDE mmol/L  --  103 CO2 mmol/L  --  25   BUN mg/dL  --  60*   CREATININE mg/dL  --  3 18*   CALCIUM mg/dL  --  8 9   TOTAL PROTEIN g/dL  --  6 9   BILIRUBIN TOTAL mg/dL  --  0 70   ALK PHOS U/L  --  162*   ALT U/L  --  53   AST U/L  --  29   GLUCOSE RANDOM mg/dL  --  256*   GLUCOSE, ISTAT mg/dl 203*  --        Results from last 7 days  Lab Units 10/14/17  0753   MAGNESIUM mg/dL 2 2                    0  Lab Value Date/Time   TROPONINI 0 93 (H) 10/14/2017 1758   TROPONINI 0 90 (H) 10/14/2017 1515   TROPONINI 0 77 (H) 10/14/2017 1206   TROPONINI 0 66 (H) 10/14/2017 0753   TROPONINI 0 49 (H) 04/14/2017 0412   TROPONINI 0 52 (H) 04/13/2017 2353   TROPONINI 0 67 (H) 04/13/2017 2248       Imaging:   10/14 CXR- Pulmonary edema with questionable infiltrates, my interpretation I have personally reviewed pertinent films in PACS  EKG: EKG demonstrates a paced rhythm This was personally reviewed by myself  Micro:  No results found for: Harjit Seat, Aisha Guy, [facility]    ______________________________________________________________________    Assessment and Plan:   1  Acute hypoxic respiratory failure  2  Acute on chronic systolic heart failure  3  Type II NSTEMI  4  Coronary artery disease  5  Diabetes mellitus  6  Acute on chronic kidney disease stage 4  7   Parkinson's disease    Neuro: Close neurologic monitoring with frequent re-orientation    CV: Close hemodynamic monitoring, continue to trend troponins, will begin Lasix infusion after further bolus, appreciate cardiology recommendations    Lung: Begin bipap to support respiratory drive, attempt to transition off after diuresis, continue scheduled nebulizers    GI: NPO while on bipap    F/E/N: Replete electrolytes as needed, NPO while on bipap    : Place hermosillo catheter, closely follow intake and output, daily weights, trend serum creatinine, appreciate nephrology recommendations    ID: Follow temperature and white count closely    Heme: Continue heparin for DVT prophylaxis, if complaint of chest pain or EKG changes will begin heparin drip    Endo: Continue insulin    Msk/Skin: Frequent turning and repositioning    Disposition: Stepdown    Counseling / Coordination of Care  Total Critical Care time spent 36 minutes excluding procedures, teaching and family updates  ______________________________________________________________________    VTE Pharmacologic Prophylaxis: Heparin  VTE Mechanical Prophylaxis: sequential compression device    Invasive lines and devices: Invasive Devices          No matching active lines, drains, or airways          Code Status: Level 1 - Full Code  POA:    POLST:      Given critical illness, patient length of stay will require greater than two midnights  Portions of the record may have been created with voice recognition software  Occasional wrong word or "sound a like" substitutions may have occurred due to the inherent limitations of voice recognition software  Read the chart carefully and recognize, using context, where substitutions have occurred        JOSE MIGUEL King    Consults

## 2017-10-14 NOTE — Clinical Note
Case was discussed with  and the patient's admission status was agreed to be Admission Status: inpatient status to the service of

## 2017-10-14 NOTE — ED NOTES
Pt returned from x-ray  Pt declined a blanket at this time  Pt currently watching tv, no evidence of distress noted  Pt placed back on cardiac monitor  Will continue to monitor at this time        Jam Phillips RN  10/14/17 0786

## 2017-10-14 NOTE — PLAN OF CARE
DISCHARGE PLANNING     Discharge to home or other facility with appropriate resources Progressing        Knowledge Deficit     Patient/family/caregiver demonstrates understanding of disease process, treatment plan, medications, and discharge instructions Progressing        PAIN - ADULT     Verbalizes/displays adequate comfort level or baseline comfort level Progressing        Potential for Falls     Patient will remain free of falls Progressing        SAFETY ADULT     Maintain or return to baseline ADL function Progressing     Maintain or return mobility status to optimal level Progressing     Patient will remain free of falls Progressing

## 2017-10-14 NOTE — CONSULTS
REFERRING PHYSICIAN: Nando Castro MD      REASON FOR THE REFERRAL / CONSULTATION:  Further management of worsening renal function/MADELIN    DATE OF CONSULTATION / SERVICE:  10/14/17    ADMISSION DIAGNOSIS: Acute on chronic diastolic CHF (congestive heart failure) (HCC)     CHIEF COMPLAINT     I am feeling weak    HPI     This is a 78-year-old male with a past medical history of CKD stage 4, systolic heart failure, coronary artery disease as/p CABG, hypertension, hyperlipidemia, diabetes type 2, admitted for further management of progressive weakness and found to have admission creatinine of 3 18 nephrology were consulted for further management of MADELIN  Upon review of old medical records patient is being followed by Dr Lana Lopez for management of CKD stage 4 and also found to have underlying baseline creatinine between 2 2-2 6  Patient was also referred to [facility] classes as outpatient in anticipation of initiation of dialysis in the future       Patient was also seen and evaluated by Cardiology service earlier today  I have personally reviewed cardiologist's note and the recommendations  Patient has received Lasix 80 mg IV on admission and currently receiving Lasix 80 mg PO BID  Patient has also history of hypertension which was well controlled on home regimen were also found to have potassium of 5 7 on admission and losartan was stopped on admission  According to patient his diabetes was well controlled with the use of insulin  Patient also history of hyperlipidemia and take pravastatin 40 mg PO daily  Patient currently denies nausea, vomiting, SOB, chest pain, abdominal pain, constipation, diarrhea or rash today      PAST MEDICAL HISTORY     Past Medical History:   Diagnosis Date    [facility] mellitus ([facility] )     Gout     History of MI (myocardial infarction)     x3    Hyperlipidemia     Hypertension     Parkinson disease ([facility] )     Renal disorder        PAST SURGICAL HISTORY     Past Surgical History:   Procedure Laterality Date    APPENDECTOMY      CARDIAC PACEMAKER PLACEMENT      CORONARY ARTERY BYPASS GRAFT      TONSILLECTOMY         ALLERGIES     No Known Allergies    SOCIAL HISTORY     History   Alcohol Use No     History   Drug Use No     History   Smoking Status    Never Smoker   Smokeless Tobacco    Never Used       FAMILY HISTORY     Family History   Problem Relation Age of Onset    Family history unknown: Yes       CURRENT MEDICATIONS       Current Facility-Administered Medications:     acetaminophen (TYLENOL) tablet 650 mg, 650 mg, Oral, Q6H PRN, Ellen Mendoza MD    allopurinol (ZYLOPRIM) tablet 100 mg, 100 mg, Oral, Daily, Ellen Mendoza MD, 100 mg at 10/14/17 1133    amiodarone tablet 200 mg, 200 mg, Oral, Daily, Ellen Mendoza MD, 200 mg at 10/14/17 1133    aspirin (ECOTRIN LOW STRENGTH) EC tablet 81 mg, 81 mg, Oral, Every Other Day, Ellen Mendoza MD, 81 mg at 10/14/17 1133    carvedilol (COREG) tablet 12 5 mg, 12 5 mg, Oral, BID With Meals, Ellen Mendoza MD    clopidogrel (PLAVIX) tablet 75 mg, 75 mg, Oral, Every Other Day, Ellen Mendoza MD, 75 mg at 10/14/17 1133    furosemide (LASIX) tablet 80 mg, 80 mg, Oral, BID (diuretic), Marty Mercado PA-C    heparin (porcine) subcutaneous injection 5,000 Units, 5,000 Units, Subcutaneous, Q8H [address] **AND** Platelet count, , , Once, Ellen Mendoza MD    influenza inactivated quadrivalent vaccine (FLULAVAL) IM injection 0 5 mL, 0 5 mL, Intramuscular, Prior to discharge, Ellen Mendoza MD    insulin glargine (LANTUS) subcutaneous injection 10 Units, 10 Units, Subcutaneous, HS, Ellen Mendoza MD    insulin lispro (HumaLOG) 100 units/mL subcutaneous injection 5 Units, 5 Units, Subcutaneous, TID With Meals, Ellen Mendoza MD    oxyCODONE-acetaminophen (PERCOCET) 5-325 mg per tablet 1 tablet, 1 tablet, Oral, Q6H PRN, Sammie Irene MD    pantoprazole (PROTONIX) EC tablet 40 mg, 40 mg, Oral, Daily, Sammie Irene MD, 40 mg at 10/14/17 1133    pravastatin (PRAVACHOL) tablet 40 mg, 40 mg, Oral, Daily With Jessica Fisher MD    formerly [facility]) capsule 0 4 mg, 0 4 mg, Oral, Daily With Dinner, Sammie Irene MD    REVIEW OF SYSTEMS     Complete 10 point review of systems were obtained and discussed in length with the patient  Complete review of systems were negative / unremarkable except mentioned in the HPI section  LAB RESULTS          Results from last 7 days  Lab Units 10/14/17  1206 10/14/17  0753   WBC Thousand/uL  --  11 18*   HEMOGLOBIN g/dL  --  13 4   HEMATOCRIT %  --  41 3   PLATELETS Thousands/uL 145* 155   SODIUM mmol/L  --  137   POTASSIUM mmol/L  --  5 8*   CHLORIDE mmol/L  --  103   CO2 mmol/L  --  25   BUN mg/dL  --  60*   CREATININE mg/dL  --  3 18*   EGFR ml/min/1 73sq m  --  18   CALCIUM mg/dL  --  8 9   MAGNESIUM mg/dL  --  2 2   ALBUMIN g/dL  --  3 2*   TOTAL PROTEIN g/dL  --  6 9   GLUCOSE RANDOM mg/dL  --  256*       I have personally reviewed the old medical records and patient's previously known baseline creatinine level is ~ 2 2-2 6    RADIOLOGY RESULTS     Chest x-ray done on 10/14/17  I have personally reviewed the images of the chest x-ray and my conclusion is patient has a pacemaker in place on left upper chest   Cardiomegaly  Increased bilateral pulmonary vascular markings  2D echocardiogram done on 4/14/17 showed EF of 35% with grade 2 diastolic dysfunction      OBJECTIVE     Current Weight: Weight - Scale: 70 8 kg (156 lb 1 4 oz)  Vitals:    10/14/17 0900   BP: 100/59   Pulse: 60   Resp: 20   Temp:    SpO2: 94%     No intake or output data in the 24 hours ending 10/14/17 1231    PHYSICAL EXAMINATION     GEN:  Awake and not in acute distress  RS:  Bilateral equal air entry, no wheezing  CVS:  S1-S2 heard  Abdomen:  Soft, nontender, nondistended, positive bowel sounds  Extremities:  Trace bilateral pedal edema, skin is warm on touch  CNS:  Awake and follows commands  HEENT:  Pink conjunctiva, no JVD, head is atraumatic  Psychiatric:  Normal mood and affect, not suicidal  Musculoskeletal:  No gross bony deformity seen  Lymph Node:  No palpable cervical lymphadenopathy    PLAN / RECOMMENDATIONS      1  MADELIN on top of CKD stage 4  Exact etiology has remained unclear and may be secondary to worsening of underlying chronic kidney disease  Upon review of old medical records patient is been followed by Dr Meggan Mcgovern in the office for management of CKD stage 4 and was also referred to [facility] classes anticipating dialysis in near future  Upon review of old medical records patient's baseline creatinine seems to be between 2 2-2 6  Admission creatinine was 3 18  Patient was also evaluated by Cardiology service and currently receiving Lasix 80 mg PO BID  Plan to check urine lytes to further evaluate the etiology of MADELIN  I will also check bladder scan to rule out urinary retention  Plan to avoid losartan in the setting of hyperkalemia and recheck renal function and potassium level again in the morning  2   Hyperkalemia  Multifactorial, admission potassium was 5 7 and losartan was stopped on admission  Plan to give Kayexalate 30 g PO x1 dose now and recheck potassium tomorrow morning again  3   Hypertension in chronic kidney disease  Currently blood pressure is controlled and at goal   Hold losartan in the setting of hyperkalemia  Continue Coreg 12 5 mg PO BID today and monitor hypertension  Thank you for the consultation to participate in patient's care  I have personally discussed my plan with the referring physician  Negrito Salinas MD  Nephrology  10/14/2017        Portions of the record may have been created with voice recognition software   Occasional wrong word or "sound a like" substitutions may have occurred due to the inherent limitations of voice recognition software  Read the chart carefully and recognize, using context, where substitutions have occurred

## 2017-10-14 NOTE — CONSULTS
Consultation - Cardiology    Faith Silva 65 y o  male MRN: [id number]  Unit/Bed#: -01 Encounter: [id number]    Physician Requesting Consult: Virginia Cruz,*    Reason for Consult / Principal Problem: CHF    HPI: Faith Silva is a 65y o  year old male with a past medical history significant for systolic CHF, CAD status post MI, CABG, ischemic cardiomyopathy status post ICD, hypertension, hyperlipidemia, CKD, diabetes mellitus  Patient presented with complaints of progressive weakness which began 2 nights ago  He reports that he was having difficulty performing his normal daily activities due to the fatigue  He does admit to some shortness of breath which she typically has at baseline  He states that his dose of Lasix was recently decreased as an outpatient  He denies recent weight gain he has been watching the sodium in his diet  He denies chest pain, palpitations, dizziness  Patient follows with cardiologist Dr Ibrahima Fabian as an outpatient  Historical Information   Past Medical History:   Diagnosis Date    [facility]     Gout     History of MI (myocardial infarction)     x3    Hyperlipidemia     Hypertension     Parkinson disease ([facility] )     Renal disorder      Past Surgical History:   Procedure Laterality Date    APPENDECTOMY      CARDIAC PACEMAKER PLACEMENT      CORONARY ARTERY BYPASS GRAFT      TONSILLECTOMY       History   Alcohol Use No     History   Drug Use No     History   Smoking Status    Never Smoker   Smokeless Tobacco    Never Used       FAMILY HISTORY:  History reviewed  No pertinent family history      MEDS & ALLERGIES:  all current active meds have been reviewed and current meds:   Current Facility-Administered Medications   Medication Dose Route Frequency    acetaminophen (TYLENOL) tablet 650 mg  650 mg Oral Q6H PRN    allopurinol (ZYLOPRIM) tablet 100 mg  100 mg Oral Daily    amiodarone tablet 200 mg  200 mg Oral Daily    aspirin (ECOTRIN LOW STRENGTH) EC tablet 81 mg  81 mg Oral Every Other Day    carvedilol (COREG) tablet 12 5 mg  12 5 mg Oral BID With Meals    clopidogrel (PLAVIX) tablet 75 mg  75 mg Oral Every Other Day    furosemide (LASIX) tablet 80 mg  80 mg Oral BID (diuretic)    heparin (porcine) subcutaneous injection 5,000 Units  5,000 Units Subcutaneous Q8H [address]    influenza inactivated quadrivalent vaccine (FLULAVAL) IM injection 0 5 mL  0 5 mL Intramuscular Prior to discharge    insulin glargine (LANTUS) subcutaneous injection 10 Units  10 Units Subcutaneous HS    insulin lispro (HumaLOG) 100 units/mL subcutaneous injection 5 Units  5 Units Subcutaneous TID With Meals    oxyCODONE-acetaminophen (PERCOCET) 5-325 mg per tablet 1 tablet  1 tablet Oral Q6H PRN    pantoprazole (PROTONIX) EC tablet 40 mg  40 mg Oral Daily    pravastatin (PRAVACHOL) tablet 40 mg  40 mg Oral Daily With Dinner    tamsulosin (FLOMAX) capsule 0 4 mg  0 4 mg Oral Daily With Dinner        No Known Allergies      REVIEW OF SYSTEMS:  Constitutional: +fagtigue, weakenss Denies fever or chills  Eyes: Denies visual disturbance change in visual acuity   HENT: Denies nasal congestion or sore throat   Respiratory: +SOB   Cardiovascular: Denies chest pain, palpitations or lower extremity swelling   GI: Denies abdominal pain, nausea, vomiting, bloody stools or diarrhea   : Denies dysuria, frequency, hematuria  Musculoskeletal: Denies back pain or joint pain   Neurologic: Denies lightheadedness, syncope, headache, focal weakness or sensory changes   Psychiatric: Denies depression, anxiety or panic       PHYSICAL EXAM:  Vitals:   Vitals:    10/14/17 0900   BP: 100/59   Pulse: 60   Resp: 20   Temp:    SpO2: 94%     Body mass index is 25 97 kg/m²      Systolic (99IZG), GILBERTO:67 , Min:93 , BGK:421     Diastolic (52SMU), OOH:14, Min:51, Max:59    No intake or output data in the 24 hours ending 10/14/17 1110  Weight (last 2 days)     Date/Time   Weight    10/14/17 0900  70 8 (156 09)    10/14/17 0734  80 8 (178 13)            Invasive Devices          No matching active lines, drains, or airways          General: Patient is not in acute distress  Laying comfortably in the bed  Head: Normocephalic  Atraumatic  HEENT: Both pupils normal sized, round and reactive to light  Nonicteric  Neck: JVP not elevated  Trachea central    Respiratory:  Overall clear to auscultation  Cardiovascular: RRR   2/6 systolic murmur present  GI: Abdomen soft, nontender  No guarding or rigidity  Neurologic: Patient is awake, alert, responding to commands  Well-oriented to time, place and person   Moving all extremities  Extremities: No clubbing, cyanosis or edema  Integument: No skin rashes or ulceration      LABORATORY RESULTS:    Results from last 7 days  Lab Units 10/14/17  0753   TROPONIN I ng/mL 0 66*       CBC with diff:   Results from last 7 days  Lab Units 10/14/17  0753   WBC Thousand/uL 11 18*   HEMOGLOBIN g/dL 13 4   HEMATOCRIT % 41 3   MCV fL 100*   PLATELETS Thousands/uL 155   MCH pg 32 5   MCHC g/dL 32 4   RDW % 12 7   MPV fL 11 3   NRBC AUTO /100 WBCs 0       CMP:  Results from last 7 days  Lab Units 10/14/17  0753   SODIUM mmol/L 137   POTASSIUM mmol/L 5 8*   CHLORIDE mmol/L 103   CO2 mmol/L 25   ANION GAP mmol/L 9   BUN mg/dL 60*   CREATININE mg/dL 3 18*   GLUCOSE RANDOM mg/dL 256*   CALCIUM mg/dL 8 9   AST U/L 29   ALT U/L 53   ALK PHOS U/L 162*   TOTAL PROTEIN g/dL 6 9   ALBUMIN g/dL 3 2*   BILIRUBIN TOTAL mg/dL 0 70   EGFR ml/min/1 73sq m 18       BMP:  Results from last 7 days  Lab Units 10/14/17  0753   SODIUM mmol/L 137   POTASSIUM mmol/L 5 8*   CHLORIDE mmol/L 103   CO2 mmol/L 25   BUN mg/dL 60*   CREATININE mg/dL 3 18*   GLUCOSE RANDOM mg/dL 256*   CALCIUM mg/dL 8 9         Results from last 7 days  Lab Units 10/14/17  0753   NT-PRO BNP pg/mL 7,446*        Results from last 7 days  Lab Units 10/14/17  0753   MAGNESIUM mg/dL 2 2           Results from last 7 days  Lab Units 10/14/17  0753   TSH 3RD GENERATON uIU/mL 2 496           Lipid Profile:   Lab Results   Component Value Date    CHOL 99 2017    CHOL 96 2017    CHOL 236 (H) 2016     Lab Results   Component Value Date    HDL 37 (L) 2017    HDL 45 2017    HDL 33 (L) 2016     Lab Results   Component Value Date    LDLCALC 39 2017    LDLCALC 34 2017    LDLCALC 139 (H) 2016     Lab Results   Component Value Date    TRIG 115 2017    TRIG 85 2017    TRIG 319 (H) 2016       Cardiac testing:   Results for orders placed during the hospital encounter of 17   Echo complete with contrast if indicated    Narrative [address] [place] [address]  [phone]    Transthoracic Echocardiogram  2D, M-mode, Doppler, and Color Doppler    Study date:  2017    Patient: Robert Mcallister  MR number: [id number]  Account number: [de-identified]  : 1940  Age: 67 years  Gender: Male  Status: Inpatient  Location: Emergency room  Height: 65 in  Weight: 182 lb  BP: 138/ 65 mmHg    Indications: heart failure, Assess left ventricular function  Diagnoses: I50 9 - Heart failure, unspecified    Sonographer:   Calvin Camarillo RDCS  Interpreting Physician:  Andressa Su MD  Primary Physician:  Emmer Curling, PA-C  Referring Physician:  Andressa Su MD  Group:  [facility]    SUMMARY    LEFT VENTRICLE:  Systolic function was severely reduced  Ejection fraction was estimated to be 35 %  There was akinesis of the basal inferolateral wall(s)  There was akinesis of the mid inferolateral wall(s)  There was akinesis of the basal inferior wall(s)  There was severe hypokinesis of the mid inferior wall(s)  There was mild concentric hypertrophy    Features were consistent with a pseudonormal left ventricular filling pattern, with concomitant abnormal relaxation and increased filling pressure (grade 2 diastolic dysfunction)  LEFT ATRIUM:  The atrium was mildly dilated  MITRAL VALVE:  There was moderate regurgitation  TRICUSPID VALVE:  There was mild regurgitation  PULMONIC VALVE:  There was mild regurgitation  HISTORY: PRIOR HISTORY: Congestive heart failure  Risk factors: hypertension and diabetes  PRIOR PROCEDURES: Pacemaker implantation  PROCEDURE: The procedure was performed in the emergency room  This was a routine study  The transthoracic approach was used  The study included complete 2D imaging, M-mode, complete spectral Doppler, and color Doppler  The heart rate was  68 bpm, at the start of the study  Images were obtained from the parasternal, apical, subcostal, and suprasternal notch acoustic windows  Echocardiographic views were limited due to poor acoustic window availability, decreased penetration,  and lung interference  This was a technically difficult study  LEFT VENTRICLE: Systolic function was severely reduced  Ejection fraction was estimated to be 35 %  There was akinesis of the basal inferolateral wall(s)  There was akinesis of the mid inferolateral wall(s)  There was akinesis of the basal  inferior wall(s)  There was severe hypokinesis of the mid inferior wall(s)  There was mild concentric hypertrophy  DOPPLER: Features were consistent with a pseudonormal left ventricular filling pattern, with concomitant abnormal relaxation  and increased filling pressure (grade 2 diastolic dysfunction)  RIGHT VENTRICLE: The size was normal  Systolic function was normal  Wall thickness was normal     LEFT ATRIUM: The atrium was mildly dilated  RIGHT ATRIUM: Size was normal  A pacing wire was present  MITRAL VALVE: DOPPLER: There was moderate regurgitation  AORTIC VALVE: Leaflets exhibited mild calcification  TRICUSPID VALVE: The valve structure was normal  There was normal leaflet separation  DOPPLER: The transtricuspid velocity was within the normal range   There was no evidence for stenosis  There was mild regurgitation  PULMONIC VALVE: DOPPLER: There was mild regurgitation  PERICARDIUM: There was no pericardial effusion  The pericardium was normal in appearance  AORTA: The root exhibited normal size  SYSTEMIC VEINS: IVC: The inferior vena cava was normal in size and course   Respirophasic changes were normal     SYSTEM MEASUREMENT TABLES    2D  %FS: 5 4 %  Ao Diam: 2 7 cm  EDV(Teich): 205 5 ml  EF Biplane: 42 6 %  EF(Teich): 11 8 %  ESV(Teich): 181 3 ml  HR_2Ch_Q: 67 3 BPM  HR_4Ch_Q: 68 8 BPM  IVSd: 1 2 cm  LA Area: 20 2 cm2  LA Diam: 4 9 cm  LVCO_2Ch_Q: 4 4 L/min  LVCO_4Ch_Q: 4 6 L/min  LVCO_BiP_Q: 4 2 L/min  LVEDV MOD A2C: 252 7 ml  LVEDV MOD A4C: 189 8 ml  LVEDV MOD BP: 218 2 ml  LVEF MOD A2C: 35 4 %  LVEF MOD A4C: 51 9 %  LVEF_2Ch_Q: 25 9 %  LVEF_4Ch_Q: 34 6 %  LVEF_BiP_Q: 28 7 %  LVESV MOD A2C: 163 3 ml  LVESV MOD A4C: 91 3 ml  LVESV MOD BP: 125 3 ml  LVIDd: 6 4 cm  LVIDs: 6 cm  LVLd A2C: 10 1 cm  LVLd A4C: 10 2 cm  LVLd_2Ch_Q: 10 5 cm  LVLd_4Ch_Q: 9 9 cm  LVLs A2C: 9 1 cm  LVLs A4C: 8 6 cm  LVLs_2Ch_Q: 9 5 cm  LVLs_4Ch_Q: 8 7 cm  LVPWd: 1 cm  LVSV_2Ch_Q: 65 2 ml  LVSV_4Ch_Q: 66 4 ml  LVSV_BiP_Q: 63 1 ml  LVVED_2Ch_Q: 252 1 ml  LVVED_4Ch_Q: 191 6 ml  LVVED_BiP_Q: 220 ml  LVVES_2Ch_Q: 186 9 ml  LVVES_4Ch_Q: 125 2 ml  LVVES_BiP_Q: 156 9 ml  RA Area: 18 1 cm2  RVIDd: 4 2 cm  SV MOD A2C: 89 4 ml  SV MOD A4C: 98 5 ml  SV(Teich): 24 2 ml    CW  AR Dec Stanton: 2 6 m/s2  AR Dec Time: 1557 ms  AR PHT: 451 5 ms  AR Vmax: 4 m/s  AR maxP 8 mmHg  MR VTI: 205 8 cm  MR Vmax: 5 5 m/s  MR Vmean: 4 1 m/s  MR maxP 9 mmHg  MR meanP 7 mmHg  TR Vmax: 3 1 m/s  TR maxP 8 mmHg    MM  TAPSE: 2 cm    PW  AVC: 399 3 ms  MV A Nicholas: 1 1 m/s  MV Dec Stanton: 9 1 m/s2  MV DecT: 152 2 ms  MV E Nicholas: 1 4 m/s  MV E/A Ratio: 1 2  MV PHT: 44 2 ms  MVA By PHT: 5 cm2    [employer] Accredited [facility]    Prepared and electronically signed by    Joel Anderson  Frederic Elizalde MD  Signed 14-Apr-2017 17:50:57         Imaging: I have personally reviewed pertinent reports  Assessment and Plan:  1  Systolic congestive heart failure/cardiomyopathy status post ICD  -EF 35% per echo 04/2017  -patient received 1 dose of IV diuretics with improvement  Will give oral Lasix 80 mg twice daily as patient does not appear to have significant volume overload at this time that would require further IV diuresis  -BNP elevated however this may be unreliable in the setting of CKD, await chest x-ray  -creatinine elevated above previous 09/2017  Continue to monitor  -continue daily weights, salt restriction, I's and O's  -continue carvedilol  No ACE/Arb due to CKD    2  Elevated troponin  0 66  -likely represents possibly elevated troponin in the setting of CKD  -No recent anginal symptoms  3   History of CAD status post CABG- no recent anginal symptoms  Continue aspirin, statin, beta-blocker    Code Status: Level 1 - Full Code      Thank you for allowing us to participate in this patient's care  Counseling / Coordination of Care  Total floor / unit time spent today 35 minutes  Greater than 50% of total time was spent with the patient and / or family counseling and / or coordination of care  A description of the counseling / coordination of care: Review of history, current assessment, development of a plan         Antonio Wing PA-C  10/14/2017,11:10 AM

## 2017-10-14 NOTE — ED PROVIDER NOTES
History  Chief Complaint   Patient presents with    Shortness of Breath     Pt started with SOB last night  Pt has hx of CHF  Pt denies any chest pain, c/o weakness  HPI    Prior to Admission Medications   Prescriptions Last Dose Informant Patient Reported?  Taking?   allopurinol (ZYLOPRIM) 100 mg tablet   Yes No   Sig: Take 100 mg by mouth   amiodarone 200 mg tablet   Yes No   Sig: Take 200 mg by mouth   aspirin (ECOTRIN LOW STRENGTH) 81 mg EC tablet   Yes No   Sig: Take by mouth every other day   calcitriol (ROCALTROL) 0 5 MCG capsule   Yes No   Sig: Take by mouth   carvedilol (COREG) 25 mg tablet   Yes No   Sig: Take 25 mg by mouth   clopidogrel (PLAVIX) 75 mg tablet   Yes No   Sig: Take by mouth   colchicine (COLCRYS) 0 6 mg tablet   Yes No   Sig: Take by mouth   furosemide (LASIX) 80 mg tablet   No No   Sig: Take 1 tablet by mouth 2 (two) times a day for 30 days   insulin glargine (LANTUS) 100 units/mL subcutaneous injection   No No   Sig: Inject 10 Units under the skin daily at bedtime for 30 days   insulin lispro (HumaLOG) 100 units/mL injection   No No   Sig: Inject 5 Units under the skin 3 (three) times a day with meals for 30 days   isosorbide mononitrate (IMDUR) 60 mg 24 hr tablet   Yes No   Sig: Take 90 mg by mouth   losartan (COZAAR) 25 mg tablet   Yes No   Sig: Take by mouth   pantoprazole (PROTONIX) 40 mg tablet   Yes No   Sig: Take by mouth   rasagiline (AZILECT) 1 MG   Yes No   Sig: Take by mouth   rosuvastatin (CRESTOR) 20 MG tablet   Yes No   Sig: Take by mouth   tamsulosin (FLOMAX) 0 4 mg   Yes No   Sig: Take by mouth      Facility-Administered Medications: None       Past Medical History:   Diagnosis Date    Diabetes mellitus ([facility] [address] )     Gout     History of MI (myocardial infarction)     x3    Hyperlipidemia     Hypertension     Parkinson disease ([facility] [address] )     Renal disorder        Past Surgical History:   Procedure Laterality Date    APPENDECTOMY      CARDIAC PACEMAKER PLACEMENT      CORONARY ARTERY BYPASS GRAFT      TONSILLECTOMY         History reviewed  No pertinent family history  I have reviewed and agree with the history as documented  Social History   Substance Use Topics    Smoking status: Never Smoker    Smokeless tobacco: Not on file    Alcohol use No        Review of Systems    Physical Exam  ED Triage Vitals [10/14/17 0734]   Temperature Pulse Respirations Blood Pressure SpO2   97 9 °F (36 6 °C) 67 19 94/52 92 %      Temp Source Heart Rate Source Patient Position - Orthostatic VS BP Location FiO2 (%)   Temporal -- Lying Right arm --      Pain Score       No Pain           Physical Exam   Constitutional: He is oriented to person, place, and time  He appears well-developed and well-nourished  No distress  HENT:   Head: Normocephalic and atraumatic  Mouth/Throat: Oropharynx is clear and moist    Eyes: Conjunctivae are normal  Pupils are equal, round, and reactive to light  Neck: Normal range of motion  No tracheal deviation present  Cardiovascular: Normal rate, regular rhythm and intact distal pulses  Murmur heard  Systolic murmur is present with a grade of 2/6   Pulmonary/Chest: Effort normal  No respiratory distress  He has rhonchi (mild, bibasilar)  Abdominal: Soft  He exhibits no distension  There is no tenderness  Small, nontender reducible umbilical hernia  Musculoskeletal: He exhibits no edema  Neurological: He is alert and oriented to person, place, and time  GCS eye subscore is 4  GCS verbal subscore is 5  GCS motor subscore is 6  Skin: Skin is warm and dry  Psychiatric: He has a normal mood and affect  His behavior is normal    Nursing note and vitals reviewed        ED Medications  Medications   furosemide (LASIX) injection 80 mg (80 mg Intravenous Given 10/14/17 0859)   aspirin chewable tablet 324 mg (324 mg Oral Given 10/14/17 0859)       Diagnostic Studies  Labs Reviewed   CBC AND DIFFERENTIAL - Abnormal        Result Value Ref Range Status WBC 11 18 (*) 4 31 - 10 16 Thousand/uL Final     (*) 82 - 98 fL Final    Neutrophils Relative 79 (*) 43 - 75 % Final    Lymphocytes Relative 10 (*) 14 - 44 % Final    Neutrophils Absolute 8 82 (*) 1 85 - 7 62 Thousands/µL Final    RBC 4 12  3 88 - 5 62 Million/uL Final    Hemoglobin 13 4  12 0 - 17 0 g/dL Final    Hematocrit 41 3  36 5 - 49 3 % Final    MCH 32 5  26 8 - 34 3 pg Final    MCHC 32 4  31 4 - 37 4 g/dL Final    RDW 12 7  11 6 - 15 1 % Final    MPV 11 3  8 9 - 12 7 fL Final    Platelets 004  938 - 390 Thousands/uL Final    nRBC 0  /100 WBCs Final    Monocytes Relative 9  4 - 12 % Final    Eosinophils Relative 2  0 - 6 % Final    Basophils Relative 0  0 - 1 % Final    Lymphocytes Absolute 1 14  0 60 - 4 47 Thousands/µL Final    Monocytes Absolute 0 95  0 17 - 1 22 Thousand/µL Final    Eosinophils Absolute 0 19  0 00 - 0 61 Thousand/µL Final    Basophils Absolute 0 04  0 00 - 0 10 Thousands/µL Final   BASIC METABOLIC PANEL - Abnormal     Potassium 5 8 (*) 3 5 - 5 3 mmol/L Final    BUN 60 (*) 5 - 25 mg/dL Final    Creatinine 3 18 (*) 0 60 - 1 30 mg/dL Final    Comment: Standardized to IDMS reference method    Glucose 256 (*) 65 - 140 mg/dL Final    Comment:   If the patient is fasting, the ADA then defines impaired fasting glucose as > 100 mg/dL and diabetes as > or equal to 123 mg/dL  Specimen collection should occur prior to Sulfasalazine administration due to the potential for falsely depressed results  Specimen collection should occur prior to Sulfapyridine administration due to the potential for falsely elevated results      Sodium 137  136 - 145 mmol/L Final    Chloride 103  100 - 108 mmol/L Final    CO2 25  21 - 32 mmol/L Final    Anion Gap 9  4 - 13 mmol/L Final    Calcium 8 9  8 3 - 10 1 mg/dL Final    eGFR 18  ml/min/1 73sq m Final    Narrative:     National Kidney Disease Education Program recommendations are as follows:  GFR calculation is accurate only with a steady state creatinine  Chronic Kidney disease less than 60 ml/min/1 73 sq  meters  Kidney failure less than 15 ml/min/1 73 sq  meters  HEPATIC FUNCTION PANEL - Abnormal     Alkaline Phosphatase 162 (*) 46 - 116 U/L Final    Albumin 3 2 (*) 3 5 - 5 0 g/dL Final    Total Bilirubin 0 70  0 20 - 1 00 mg/dL Final    Bilirubin, Direct 0 13  0 00 - 0 20 mg/dL Final    Comment: Slightly Hemolyzed; Results May be Affected    AST 29  5 - 45 U/L Final    Comment:   Specimen collection should occur prior to Sulfasalazine administration due to the potential for falsely depressed results  ALT 53  12 - 78 U/L Final    Comment:   Specimen collection should occur prior to Sulfasalazine administration due to the potential for falsely depressed results  Total Protein 6 9  6 4 - 8 2 g/dL Final   TROPONIN I - Abnormal     Troponin I 0 66 (*) <=0 04 ng/mL Final    Narrative:     [employer] Chemistry analyzer 99% cutoff is > 0 04 ng/mL in network labs    o cTnI 99% cutoff is useful only when applied to patients in the clinical setting of myocardial ischemia  o cTnI 99% cutoff should be interpreted in the context of clinical history, ECG findings and possibly cardiac imaging to establish correct diagnosis  o cTnI 99% cutoff may be suggestive but clearly not indicative of a coronary event without the clinical setting of myocardial ischemia  NT-BNP PRO (BRAIN NATRIURETIC PEPTIDE) - Abnormal     NT-proBNP 7,446 (*) <450 pg/mL Final   TSH, 3RD GENERATION - Normal    TSH 3RD GENERATON 2 496  0 358 - 3 740 uIU/mL Final    Narrative:     Patients undergoing fluorescein dye angiography may retain small amounts of fluorescein in the body for 48-72 hours post procedure  Samples containing fluorescein can produce falsely depressed TSH values  If the patient had this procedure,a specimen should be resubmitted post fluorescein clearance     MAGNESIUM - Normal    Magnesium 2 2  1 6 - 2 6 mg/dL Final       XR chest 2 views    (Results Pending) Procedures  ECG 12 Lead Documentation  Date/Time: 10/14/2017 8:07 AM  Performed by: Ruy Boateng  Authorized by: Ruy Boateng     Indications / Diagnosis:  Generalized weakness  ECG reviewed by me, the ED Provider: yes    Patient location:  ED  Previous ECG:     Previous ECG:  Compared to current    Comparison ECG info:  04/13/17    Similarity:  No change  Interpretation:     Interpretation: abnormal    Rate:     ECG rate:  68    ECG rate assessment: normal    Rhythm:     Rhythm: paced    Pacing:     Capture:  Complete    Type of pacing:  Ventricular  Comments:      Similar morphology of QRS complexes, ST segments and T-waves as prior EKG      CriticalCare Time  Performed by: Ruy Boateng  Authorized by: Ruy Boateng     Critical care provider statement:     Critical care time (minutes):  45    Critical care was necessary to treat or prevent imminent or life-threatening deterioration of the following conditions:  Cardiac failure and respiratory failure    Critical care was time spent personally by me on the following activities:  Obtaining history from patient or surrogate, development of treatment plan with patient or surrogate, evaluation of patient's response to treatment, examination of patient, discussions with consultants, review of old charts, re-evaluation of patient's condition, ordering and review of radiographic studies, ordering and review of laboratory studies and ordering and performing treatments and interventions    I assumed direction of critical care for this patient from another provider in my specialty: no            Phone Contacts  ED Phone Contact    ED Course  ED Course                                MDM  Number of Diagnoses or Management Options  Acute on chronic combined systolic and diastolic congestive heart failure ([address] ): new and requires workup  Elevated troponin: new and requires workup  Generalized weakness: new and requires workup  Hyperkalemia: new and requires workup  Hypoxemia: new and requires workup  Diagnosis management comments: This is a 70-year-old male who presents here with generalized weakness since last night  He states it started shortly before bed and has progressed throughout the night  He states initially he was having a hard time walking to the bathroom because of weakness, and finally called EMS when he had to call his wife to help him get off the toilet because he was too weak to do so himself  He has chronic shortness of breath, dyspnea on exertion, and orthopnea, and states this is not worse than normal  He denies any pain or palpitations  He denies any fevers, URI symptoms, nausea, vomiting, diarrhea, changes in his urine, or any other infectious complaints  He states about a month ago his cardiologist decreased several doses of his medications because of his kidney function, including his Lasix, but does not remember the rest of the medications that were changed  He denies any recent trauma or pain  He denies any known blood loss, dark, tarry, sticky stools  He denies any focal weakness, and states it is generalized  He states he has been trying to lose weight because of his worsening kidney function, and thinks he has lost about 30 pounds, but no rapid weight loss  He denies any lower extremity edema or recent weight gain  He states several years ago he had an episode where he felt this weak, and required a blood transfusion  ROS: Otherwise negative, unless stated as above  He is well-appearing, in no acute distress  He is mildly hypoxic on exam, 88% to 92% on room air  He has minimal rhonchi at the bases  He has no lower extremity edema or respiratory distress  There is no focal weakness  The remainder of his exam is unremarkable  Differential includes CHF exacerbation, MADELIN, electrolyte abnormality, anemia, ACS, thyroid abnormality, infectious    We will check lab work and chest x-ray to evaluate  His cardiology note from 09/01 mentions intermittent dizziness and orthostasis, for which his Crestor was decreased to 5 milligrams, his Lasix from twice a day to once a day, and his nitrates were held  The note mentions decreasing his Coreg from twice daily to once daily if needed for continued symptoms  However, there are no blood pressures either at rest or orthostatic vital signs documented in the note  The nephrology note from 09/19 mentions the patient losartan having been decreased by his cardiologist, though this is not mentioned in the cardiology note  His chest x-ray was reviewed by myself, and shows pulmonary edema  His BNP today is 7446, which is higher than when he was last admitted for CHF exacerbation  His troponin is mildly elevated at 0 66  The last time he was admitted for CHF exacerbation he had a similar troponin, that trended down to 0 48 by discharge  His creatinine is at the high end of his normal range based on prior visits  As his troponin is similar to prior, and his creatinine is at the high end of his range, this is likely a combination of baseline elevated troponin with possible component of demand ischemia from CHF exacerbation  He has a mildly elevated hyperkalemia, but no signs of abnormalities on EKG  As he will be getting Lasix for his CHF exacerbation, this will help his potassium  He does not need calcium, bicarb, or insulin at this time  He has a mild leukocytosis  The remainder of his labs are unremarkable  Given his oxygen requirement and recurrent CHF exacerbation, we will admit him for further management  I updated the patient and wife on findings and plan of care, and they expressed understanding         Amount and/or Complexity of Data Reviewed  Clinical lab tests: reviewed and ordered  Tests in the radiology section of CPT®: ordered and reviewed  Decide to obtain previous medical records or to obtain history from someone other than the patient: yes  Review and summarize past medical records: yes  Independent visualization of images, tracings, or specimens: yes      CritCare Time    Disposition  Final diagnoses:   Acute on chronic combined systolic and diastolic congestive heart failure (HCC)   Hypoxemia   Elevated troponin - baseline versus demand ischemia   Hyperkalemia   Generalized weakness     ED Disposition     ED Disposition Condition Comment    Admit  Case was discussed with Dr Claudio Campos and the patient's admission status was agreed to be Admission Status: inpatient status to the service of Dr Claudio Campos  Follow-up Information    None       Patient's Medications   Discharge Prescriptions    No medications on file     No discharge procedures on file      ED Provider  Electronically Signed by       Isidra Carrillo MD  10/14/17 2069

## 2017-10-14 NOTE — H&P
History and Physical - [facility] Internal Medicine    Patient Information: Dennis Marrero 65 y o  male MRN: [id number]  Unit/Bed#: -01 Encounter: [id number]  Admitting Physician: Ana Lilia Luu MD  PCP: Morenita Ro MD  Date of Admission:  10/14/17    Assessment/Plan:    Hospital Problem List:     Principal Problem:    Acute on chronic diastolic CHF (congestive heart failure) ([facility] )  Active Problems:    Essential hypertension    Hyperkalemia    Diabetes mellitus with hyperglycemia, with long-term current use of insulin ([facility] )    Cardiac defibrillator in place    Stage 4 chronic kidney disease ([facility] )    Parkinson disease ([facility] )    Elevated troponin      Plan for the Primary Problem(s):  · Acute on chronic diastolic CHF  · Most likely due to decrease in Lasix recently, with concomitant chronic kidney disease  · Cardiology consultation, patient symptomatic still, Lasix 80 mg p o  b i d  was ordered by Cardiology next dose now  · Trend troponin  · Telemetry  · Low-sodium diet  · Oxygen to keep saturation above 94%  · PT evaluation  · Further recommendations per Cardiology    Plan for Additional Problems:   · Essential hypertension  With underlying Parkinson's disease, suspect has component of autonomic dysfunction  Check orthostatic vitals  Continue Coreg, any changes per Cardiology  Low-sodium diet    · Hyperkalemia  Mild, likely in setting of worsened renal function  Hold off with any IV fluids given CHF exacerbation  Consult Nephrology and trend levels    · Diabetes mellitus with hyperglycemia with long-term current use of insulin  Will continue home dose of Lantus and short-acting mealtime insulin  Add additional mealtime coverage sliding scale if needed    · Cardiac defibrillator in place  Patient denies any malfunction, random firing    · Stage 4 chronic kidney disease  Baseline appears around 2 8, increased on presentation, likely cardiorenal syndrome  Diuresis per Cardiology, consult Nephrology, caution with IV hydration    · Parkinson's disease  Continue Azilect  PT evaluation  Orthostatic vitals    · Elevated troponin  Likely in setting of volume overload, present in past in similar range  Consult Cardiology  Trend troponin    VTE Prophylaxis: Heparin  / sequential compression device   Code Status:  Full  POLST: There is no POLST form on file for this patient (pre-hospital)    Anticipated Length of Stay:  Patient will be admitted on an Inpatient basis with an anticipated length of stay of  more than 2 midnights  Justification for Hospital Stay:  Acute CHF exacerbation    Total Time for Visit, including Counseling / Coordination of Care: 45 minutes  Greater than 50% of this total time spent on direct patient counseling and coordination of care  Chief Complaint:   Shortness of breath    History of Present Illness:    Leslye Tilley is a 65 y o  male with past medical history pertinent for CHF, diabetes , hypertension, Parkinson's disease and CKD stage 4 who presents with acute 1 day onset of shortness of breath, with no associated chest pain, dizziness, palpitations, syncope or leg swelling  Patient recently had a change in his medication regimen, his Lasix, and Coreg were both decreased  Patient has underlying Parkinson's disease and tends to have low blood pressure  He was recently seen by his neurologist, nephrologist and cardiologist  On presentation in the ED patient was hypoxic at 88% on room air, chest x-ray preliminary per ED attending visualized vascular congestion ,he received 80 mg IV Lasix, he reports it did not improve his breathing status  Currently patient states he feels that he cannot get a breath in or out  Denies any chest pain or pressure, denies any neck or jaw pain    Review of Systems:    Review of Systems   Constitutional: Positive for fatigue  HENT: Negative  Eyes: Negative  Respiratory: Positive for chest tightness, shortness of breath and wheezing      Cardiovascular: Negative for chest pain, palpitations and leg swelling  Gastrointestinal: Negative  Endocrine: Negative  Genitourinary: Negative  Musculoskeletal: Negative  Skin: Negative  Allergic/Immunologic: Negative  Neurological: Positive for weakness  Hematological: Negative  Psychiatric/Behavioral: Positive for sleep disturbance  The patient is nervous/anxious  All other systems reviewed and are negative  Past Medical and Surgical History:     Past Medical History:   Diagnosis Date    Diabetes mellitus ([facility] )     Gout     History of MI (myocardial infarction)     x3    Hyperlipidemia     Hypertension     Parkinson disease ([facility] )     Renal disorder        Past Surgical History:   Procedure Laterality Date    APPENDECTOMY      CARDIAC PACEMAKER PLACEMENT      CORONARY ARTERY BYPASS GRAFT      TONSILLECTOMY         Meds/Allergies:    Prior to Admission medications    Medication Sig Start Date End Date Taking?  Authorizing Provider   oxyCODONE-acetaminophen (PERCOCET) 5-325 mg per tablet Take 1 tablet by mouth every 6 (six) hours as needed for moderate pain   Yes Historical Provider, MD   allopurinol (ZYLOPRIM) 100 mg tablet Take 100 mg by mouth daily   2/15/17   Historical Provider, MD   amiodarone 200 mg tablet Take 200 mg by mouth daily   11/14/16   Historical Provider, MD   aspirin (ECOTRIN LOW STRENGTH) 81 mg EC tablet Take by mouth every other day    Historical Provider, MD   carvedilol (COREG) 25 mg tablet Take 25 mg by mouth daily   2/15/17   Historical Provider, MD   clopidogrel (PLAVIX) 75 mg tablet Take by mouth every other day      Historical Provider, MD   furosemide (LASIX) 80 mg tablet Take 1 tablet by mouth 2 (two) times a day for 30 days  Patient taking differently: Take 80 mg by mouth daily   4/17/17 5/17/17  Paige Dietrich DO   insulin glargine (LANTUS) 100 units/mL subcutaneous injection Inject 10 Units under the skin daily at bedtime for 30 days 4/17/17 5/17/17  Jagdeep Berrios DO Kory   insulin lispro (HumaLOG) 100 units/mL injection Inject 5 Units under the skin 3 (three) times a day with meals for 30 days 4/17/17 5/17/17  Leopoldo Conquest, DO   losartan (COZAAR) 25 mg tablet Take by mouth daily   11/28/16   Historical Provider, MD   pantoprazole (PROTONIX) 40 mg tablet Take by mouth daily      Historical Provider, MD   rasagiline (AZILECT) 1 MG Take by mouth daily   9/28/15   Historical Provider, MD   rosuvastatin (CRESTOR) 20 MG tablet Take 10 mg by mouth every other day      Historical Provider, MD   tamsulosin (FLOMAX) 0 4 mg Take by mouth    Historical Provider, MD   calcitriol (ROCALTROL) 0 5 MCG capsule Take by mouth  10/14/17  Historical Provider, MD   colchicine (COLCRYS) 0 6 mg tablet Take by mouth  10/14/17  Historical Provider, MD   isosorbide mononitrate (IMDUR) 60 mg 24 hr tablet Take 90 mg by mouth 4/4/17 10/14/17  Historical Provider, MD     I have reviewed home medications with patient personally  Allergies: No Known Allergies    Social History:     Marital Status: /Civil Union   Occupation:  Retired  Patient Pre-hospital Living Situation:  Home  Patient Pre-hospital Level of Mobility:  Independent  Patient Pre-hospital Diet Restrictions:  Low-salt  Substance Use History:   History   Alcohol Use No     History   Smoking Status    Never Smoker   Smokeless Tobacco    Never Used     History   Drug Use No       Family History:    Family History   Problem Relation Age of Onset    Family history unknown: Yes       Physical Exam:     Vitals:   Blood Pressure: 100/59 (10/14/17 0900)  Pulse: 60 (10/14/17 0900)  Temperature: 97 9 °F (36 6 °C) (10/14/17 0734)  Temp Source: Temporal (10/14/17 0734)  Respirations: 20 (10/14/17 0900)  Height: 5' 5" (165 1 cm) (10/14/17 0900)  Weight - Scale: 70 8 kg (156 lb 1 4 oz) (10/14/17 0900)  SpO2: 94 % (10/14/17 0900)    Physical Exam   Constitutional: He is oriented to person, place, and time   He appears well-developed and well-nourished  He appears distressed  Noted patient is anxious with increased work of breathing   HENT:   Head: Normocephalic and atraumatic  Moist oral mucosa   Eyes: EOM are normal  Pupils are equal, round, and reactive to light  Neck: Normal range of motion  Neck supple  No JVD present  No thyromegaly present  Cardiovascular: Normal rate and regular rhythm  No extrasystoles are present  Murmur heard  Systolic murmur is present with a grade of 1/6   Pulmonary/Chest: Effort normal  No accessory muscle usage or stridor  Tachypnea noted  He has wheezes in the right middle field and the left middle field  He has rales in the right lower field and the left lower field  Prolonged expiration, mild respiratory distress, able to speak full sentences   Abdominal: Soft  Bowel sounds are normal    Obese protuberant abdomen   Musculoskeletal:   No peripheral edema   Neurological: He is alert and oriented to person, place, and time  No cranial nerve deficit  Skin: Skin is warm and dry  No rash noted  He is not diaphoretic  No cyanosis  No pallor  Nails show no clubbing  Psychiatric: His speech is normal and behavior is normal  Judgment and thought content normal  His mood appears anxious  Cognition and memory are normal    Nursing note and vitals reviewed  Additional Data:     Lab Results: I have personally reviewed pertinent reports          Results from last 7 days  Lab Units 10/14/17  1206 10/14/17  0753   WBC Thousand/uL  --  11 18*   HEMOGLOBIN g/dL  --  13 4   HEMATOCRIT %  --  41 3   PLATELETS Thousands/uL 145* 155   NEUTROS PCT %  --  79*   LYMPHS PCT %  --  10*   MONOS PCT %  --  9   EOS PCT %  --  2       Results from last 7 days  Lab Units 10/14/17  0753   SODIUM mmol/L 137   POTASSIUM mmol/L 5 8*   CHLORIDE mmol/L 103   CO2 mmol/L 25   BUN mg/dL 60*   CREATININE mg/dL 3 18*   CALCIUM mg/dL 8 9   TOTAL PROTEIN g/dL 6 9   BILIRUBIN TOTAL mg/dL 0 70   ALK PHOS U/L 162*   ALT U/L 53   AST U/L 29   GLUCOSE RANDOM mg/dL 256*           Imaging: I have personally reviewed pertinent films in PACS  Bilateral flurry opacities, bibasilar blunting of angles suspect small effusions  Official read pending    EKG, Pathology, and Other Studies Reviewed on Admission:   · EKG: V paced,unchanged from prior    [employer]scripts Records Reviewed: Yes     ** Please Note: Dragon 360 Dictation voice to text software may have been used in the creation of this document   **

## 2017-10-15 PROBLEM — R79.89 AZOTEMIA: Status: ACTIVE | Noted: 2017-01-01

## 2017-10-15 NOTE — PLAN OF CARE
Problem: DISCHARGE PLANNING - CARE MANAGEMENT  Goal: Discharge to post-acute care or home with appropriate resources  INTERVENTIONS:  - Conduct assessment to determine patient/family and health care team treatment goals, and need for post-acute services based on payer coverage, community resources, and patient preferences, and barriers to discharge  - Address psychosocial, clinical, and financial barriers to discharge as identified in assessment in conjunction with the patient/family and health care team  - Arrange appropriate level of post-acute services according to patients   needs and preference and payer coverage in collaboration with the physician and health care team  - Communicate with and update the patient/family, physician, and health care team regarding progress on the discharge plan  - Arrange appropriate transportation to post-acute venues  Outcome: Adequate for Discharge  PT eval rec home PT  cm met w pt who resides in [facility] w his wife, Kem Rojas  he reports Kem Rojas is POA, but that they are unable to locate paperwork at this time  cm advised him on where to and how to for new paperwork  resides in one story home  ind w adls  cane prn  drives  uses [facility] on [address] for RXs  reports no issues obtaining medications  refuses in home VNA  preference outpatient   sticky note left for MD  wife will transport home at dc  no other reported needs at this time

## 2017-10-15 NOTE — PLAN OF CARE
Problem: PHYSICAL THERAPY ADULT  Goal: Performs mobility at highest level of function for planned discharge setting  See evaluation for individualized goals  Treatment/Interventions: Functional transfer training, LE strengthening/ROM, Elevations, Therapeutic exercise, Endurance training, Patient/family training, Equipment eval/education, Bed mobility, Gait training, Spoke to nursing  Equipment Recommended: Other (Comment) (TBD, continue RW vs SPC)       See flowsheet documentation for full assessment, interventions and recommendations  Outcome: Progressing  Prognosis: Good  Problem List: Decreased strength, Decreased endurance, Impaired balance, Decreased mobility  Assessment: Pt seen for PT treatment session this date with interventions consisting of gait training w/ emphasis on improving pt's ability to ambulate level surfaces x 15' x4 w/ standing rest between 2* fatigue/SOB ft with SBA provided by therapist with RW and Therapeutic exercise consisting of: AROM 10 B LE in sitting position  Pt requiring frequent rest breaks throughout session 2* fatigue/SOB/GARDNER  Pt in no apparent distress, A&O x 4 and responsive  In comparison to previous session, pt with improvements in initiation of B LE [place], note pt's gait pattern more symmetrical and stable when utilizing RW vs no AD (continue using for now)  B LE HEP handout provided and reviewed for [place] as demonstrated above , Pt w/ good carryover for HEP, verbalizing understanding  PT to assess carryover next session  , Post session: pt returned back to recliner, all needs in reach and RN notified of session findings/recommendations and NSG staff educated/encouraged to mobilize ad maryuri to maximize endurance gains w/ use of RW and staff A Continue to recommend Home PT at time of d/c in order to maximize pt's functional independence and safety w/ mobility   Pt continues to be functioning below baseline level, and remains limited 2* factors listed above and including at risk for falls  PT will continue to see pt while here in order to address the deficits listed above and provide interventions consistent w/ POC in effort to achieve STGs  Barriers to Discharge: None  Barriers to Discharge Comments: (+) PABLO  Recommendation: Home PT, Home with family support (anticipated pending progress)     PT - OK to Discharge: No    See flowsheet documentation for full assessment

## 2017-10-15 NOTE — PROGRESS NOTES
Progress Note - Critical Care   Guillermo Cullen 65 y o  male MRN: [id number]  Unit/Bed#:  Encounter: [id number]    Attending Physician: Mikey Anguiano MD  ______________________________________________________________________  Assessment and Plan:   1  Acute hypoxic respiratory failure  2  Acute on chronic systolic heart failure  3  Type II NSTEMI  4  Coronary artery disease  5  Diabetes mellitus  6  Acute on chronic stage 4 kidney disease  7  Parkinson's disase    Neuro: Close neurologic monitoring with frequent re-orientation    CV: Close hemodynamic monitoring, trend troponins, continue Lasix infusion for now, if unable to transition off bipap, would start low dose IV beta-blocker, appreciate cardiology recommendations    Pulm: Trial off bipap this morning, continue scheduled nebulizers    GI: NPO on bipap    : Replete electrolytes as needed, NPO on bupap    F/E/N: Follow intake and output, trend serum creatinine, appreciate nephrology recommendations    ID: Follow temperature and white count    Heme: Continue heparin for DVT prophylaxis    Endo: Follow blood sugars, continue sliding scale     Msk/Skin: Frequent turning and repositioning, out of bed as tolerated    Disposition: Stepdown- improving    Code Status: Level 1 - Full Code    Counseling / Coordination of Care  Total Critical Care time spent 31 minutes excluding procedures, teaching and family updates  ______________________________________________________________________    Chief Complaint: "I feel better"    24 Hour Events: Patient is a 65year old male admitted on 10/14 with a one day history of shortness of breath and was treated for acute systolic heart failure   His breathing worsened and he was transferred to the stepdown unit for bipap  ______________________________________________________________________    Physical Exam:   Gen: Awake, alert, pleasant, in no acute distress  Neuro: GCS 15, moves all extremities, sensation intact  HEENT: Atraumatic, PERRL, mild JVD, trachea midline, facemask in place  CV: Regular rate and rhythm  Pulm: Lung sounds clear to auscultation bilaterally  GI: Abdomen soft, non-tender, non-distended with bowel sounds present  : Calabrese in place with dark yellow urine  MSK: Extremities atraumatic with no clubbing/cyanosis/edema  Skin: Warm, dry, intact  ______________________________________________________________________  Vitals:    10/14/17 2100 10/14/17 2300 10/14/17 2320 10/15/17 0000   BP: 105/57   104/57   Pulse: 86   80   Resp: (!) 29   22   Temp:  99 1 °F (37 3 °C)     TempSrc:  Axillary     SpO2: 97%  99% 98%   Weight:       Height:           Temperature:   Temp (24hrs), Av 9 °F (37 2 °C), Min:97 9 °F (36 6 °C), Max:99 5 °F (37 5 °C)    Current Temperature: 99 1 °F (37 3 °C)  Weights:   IBW: 61 5 kg    Body mass index is 28 4 kg/m²  Weight (last 2 days)     Date/Time   Weight    10/14/17 2024  77 4 (170 64)    10/14/17 1049  70 8 (156 09)    10/14/17 0900  70 8 (156 09)    10/14/17 0734  80 8 (178 13)            Hemodynamic Monitoring:  N/A     Non-Invasive/Invasive Ventilation Settings:  Respiratory    Lab Data (Last 4 hours)    None         O2/Vent Data (Last 4 hours)      10/14 2320          Non-Invasive Ventilation Mode BiPAP                 No results found for: PHART, DQW5DLG, PO2ART, JEO2QAT, M1AVZLGU, BEART, SOURCE  SpO2: SpO2: 98 %, SpO2 Activity: SpO2 Activity: At Rest, SpO2 Device: O2 Device: Other (comment) (BIPAP)  Intake and Outputs:  I/O       10/13 0701 - 10/14 0700 10/14 0701 - 10/15 0700    I V  (mL/kg)  3 2 (0)    Total Intake(mL/kg)  3 2 (0)    Urine (mL/kg/hr)  425    Total Output   425    Net   -421 8                UOP: 60/hour     Nutrition:        Diet Orders            Start     Ordered    10/14/17 2022  Diet NPO  Diet effective now     Question Answer Comment   Diet Type NPO    RD to adjust diet per protocol?  Yes        10/14/17 2021        Labs:     Results from last 7 days  Lab Units 10/14/17  1932 10/14/17  1206 10/14/17  0753   WBC Thousand/uL  --   --  11 18*   HEMOGLOBIN g/dL  --   --  13 4   I STAT HEMOGLOBIN g/dl 13 3  --   --    HEMATOCRIT %  --   --  41 3   PLATELETS Thousands/uL  --  145* 155   NEUTROS PCT %  --   --  79*   MONOS PCT %  --   --  9       Results from last 7 days  Lab Units 10/15/17  0022 10/14/17  1932 10/14/17  0753   SODIUM mmol/L 141  --  137   POTASSIUM mmol/L 4 4  --  5 8*   CHLORIDE mmol/L 105  --  103   CO2 mmol/L 26  --  25   BUN mg/dL 71*  --  60*   CREATININE mg/dL 3 58*  --  3 18*   CALCIUM mg/dL 8 7  --  8 9   TOTAL PROTEIN g/dL  --   --  6 9   BILIRUBIN TOTAL mg/dL  --   --  0 70   ALK PHOS U/L  --   --  162*   ALT U/L  --   --  53   AST U/L  --   --  29   GLUCOSE RANDOM mg/dL 141*  --  256*   GLUCOSE, ISTAT mg/dl  --  203*  --        Results from last 7 days  Lab Units 10/15/17  0025 10/14/17  0753   MAGNESIUM mg/dL 2 2 2 2     Lab Results   Component Value Date    PHOS 3 0 09/18/2017    PHOS 3 9 07/25/2017    PHOS 3 2 05/26/2017    PHOS 3 0 11/24/2015            0  Lab Value Date/Time   TROPONINI 1 09 (H) 10/15/2017 0022   TROPONINI 0 95 (H) 10/14/2017 2102   TROPONINI 0 93 (H) 10/14/2017 1758   TROPONINI 0 90 (H) 10/14/2017 1515   TROPONINI 0 77 (H) 10/14/2017 1206   TROPONINI 0 66 (H) 10/14/2017 0753   TROPONINI 0 49 (H) 04/14/2017 0412   TROPONINI 0 52 (H) 04/13/2017 2353   TROPONINI 0 67 (H) 04/13/2017 2248         ABG:No results found for: PHART, JDH4FQF, PO2ART, SED0MDQ, B9MEQRNL, BEART, SOURCE    Imaging:   10/14 CXR- Vascular congestion with questionable infiltrate, my interpretation I have personally reviewed pertinent films in PACS    EKG: Review of telemetry demonstrates a paced rhythm    Micro:  No results found for: Donna Ports, WOUNDCULT, SPUTUMCULTUR    Allergies: No Known Allergies    Medications:   Scheduled Meds:  heparin (porcine) 5,000 Units Subcutaneous Q8H [facility]   insulin glargine 10 Units Subcutaneous HS insulin lispro 1-5 Units Subcutaneous TID AC   insulin lispro 5 Units Subcutaneous TID With Meals   ipratropium 0 5 mg Nebulization TID   levalbuterol 1 25 mg Nebulization TID     Continuous Infusions:  furosemide 10 mg/hr Last Rate: 10 mg/hr (10/14/17 2051)     PRN Meds:       VTE Pharmacologic Prophylaxis: Heparin  VTE Mechanical Prophylaxis: sequential compression device    Invasive lines and devices: Invasive Devices     Peripheral Intravenous Line            Peripheral IV 10/14/17 Right Forearm less than 1 day    Peripheral IV 10/14/17 Right;Dorsal (posterior) Forearm less than 1 day          Drain            Urethral Catheter Temperature probe 16 Fr  less than 1 day                     Portions of the record may have been created with voice recognition software  Occasional wrong word or "sound a like" substitutions may have occurred due to the inherent limitations of voice recognition software  Read the chart carefully and recognize, using context, where substitutions have occurred      JOSE MIGUEL Page

## 2017-10-15 NOTE — PLAN OF CARE
Problem: PHYSICAL THERAPY ADULT  Goal: Performs mobility at highest level of function for planned discharge setting  See evaluation for individualized goals  Treatment/Interventions: Functional transfer training, LE strengthening/ROM, Elevations, Therapeutic exercise, Endurance training, Patient/family training, Equipment eval/education, Bed mobility, Gait training, Spoke to nursing  Equipment Recommended: Other (Comment) (TBD, continue RW vs SPC)       See flowsheet documentation for full assessment, interventions and recommendations  Prognosis: Good  Problem List: Decreased strength, Decreased endurance, Impaired balance, Decreased mobility  Assessment: Pt is 65 y o  male seen for PT evaluation on 10/15/2017 s/p admit to [facility] on 10/14/2017 w/ Acute on chronic diastolic CHF (congestive heart failure) ([facility] [address] )  Pt presents w/ acute onset of SOB  PT consulted to assess pt's functional mobility and d/c needs  Order placed for PT eval and tx, w/ up as tolerated order  Comorbidities affecting pt's physical performance at time of assessment include: CHF, DM, gout, h/o MI, HLD, HTN, Parkinson's disease, renal disorder  PTA, pt was independent w/ all functional mobility w/ prn SPC, ambulates unrestricted distances and all terrain, has 4 PABLO, lives w/ wife in 1 level home and retired  Personal factors affecting pt at time of IE include: inaccessible home environment, ambulating w/ assistive device, stairs to enter home, inability to navigate community distances, limited home support, positive fall history, decreased initiation and engagement, limited insight into impairments and inability to perform IADLs   Please find objective findings from PT assessment regarding body systems outlined above with impairments and limitations including weakness, impaired balance, decreased endurance, gait deviations, decreased activity tolerance, decreased safety awareness and fall risk, as well as mobility assessment (need for close SBA-CGA assist w/ all phases of mobility when usually ambulating independently and need for cueing for mobility technique)  The following objective measures performed on IE also reveal limitations: Barthel Index: 55/100 and Modified Grayson: 3 (moderate disability)  Pt's clinical presentation is currently unstable/unpredictable seen in pt's presentation of need for input for task focus and mobility technique, on telemetry monitoring and unstable medical status  Pt to benefit from continued PT tx to address deficits as defined above and maximize level of functional independent mobility and consistency  From PT/mobility standpoint, recommendation at time of d/c would be Home PT w/ family support pending progress in order to facilitate return to PLOF  Barriers to Discharge: None  Barriers to Discharge Comments: (+) PABLO  Recommendation: Home PT, Home with family support (anticipated pending progress)     PT - OK to Discharge: No    See flowsheet documentation for full assessment

## 2017-10-15 NOTE — PROGRESS NOTES
Patient is being trasfferred to ICU  Report is given to Margi, RN  ICU is to come and transfer patient to ICU

## 2017-10-15 NOTE — PROGRESS NOTES
Progress Note - Cardiology     Filipe Saldivar 65 y o  male MRN: [id number]  Unit/Bed#:  Encounter: [id number]      Subjective:   Patient developed respiratory distress overnight and was brought to the unit  He was on Bipap overnight and is now on NC  Currently on lasix drip with adequate urine output    Objective:   Vitals:  Vitals:    10/15/17 0830   BP: 100/55   Pulse: 78   Resp: (!) 30   Temp:    SpO2: 94%       Body mass index is 28 14 kg/m²  Systolic (88OPQ), JJN:428 , Min:97 , EPN:345     Diastolic (64VMS), QCF:60, Min:51, Max:57      Intake/Output Summary (Last 24 hours) at 10/15/17 1017  Last data filed at 10/15/17 0830   Gross per 24 hour   Intake            21 15 ml   Output             1030 ml   Net         -1008 85 ml     Weight (last 2 days)     Date/Time   Weight    10/15/17 0419  76 7 (169 09)    10/14/17 2024  77 4 (170 64)    10/14/17 1049  70 8 (156 09)    10/14/17 0900  70 8 (156 09)    10/14/17 0734  80 8 (178 13)              PHYSICAL EXAM:  General: Patient is not in acute distress  Head: Normocephalic  Atraumatic  HEENT: Both pupils normal sized, round and reactive to light  Nonicteric  Neck: JVP not raised  Trachea central  No thyromegaly  Respiratory:  Diminished breath sounds throughout  Cardiovascular: RRR  S1 and S2 normal  No murmur, rub or gallop  GI: Abdomen soft, nontender  No guarding or rigidity  Neurologic: Patient is awake, alert, responding to command   Moving all extremities  Integumentary:  No skin rash  Extremities:  +1 bilateral edema    LABORATORY RESULTS:    Results from last 7 days  Lab Units 10/15/17  0831 10/15/17  0436 10/15/17  0022   TROPONIN I ng/mL 1 18* 1 29* 1 09*     CBC with diff:   Results from last 7 days  Lab Units 10/15/17  0436 10/14/17  1932 10/14/17  1206 10/14/17  0753   WBC Thousand/uL 10 74*  --   --  11 18*   HEMOGLOBIN g/dL 12 8  --   --  13 4   I STAT HEMOGLOBIN g/dl  --  13 3  --   --    HEMATOCRIT % 38 2  --   --  41 3   MCV fL 98  -- --  100*   PLATELETS Thousands/uL 143*  --  145* 155   MCH pg 32 8  --   --  32 5   MCHC g/dL 33 5  --   --  32 4   RDW % 12 7  --   --  12 7   MPV fL 10 8  --  11 0 11 3   NRBC AUTO /100 WBCs 0  --   --  0       CMP:  Results from last 7 days  Lab Units 10/15/17  0436 10/15/17  0022 10/14/17  1932 10/14/17  0753   SODIUM mmol/L 142 141  --  137   POTASSIUM mmol/L 4 0 4 4  --  5 8*   CHLORIDE mmol/L 105 105  --  103   CO2 mmol/L 26 26  --  25   ANION GAP mmol/L 11 10  --  9   BUN mg/dL 73* 71*  --  60*   CREATININE mg/dL 3 59* 3 58*  --  3 18*   GLUCOSE RANDOM mg/dL 149* 141*  --  256*   GLUCOSE, ISTAT mg/dl  --   --  203*  --    CALCIUM mg/dL 8 5 8 7  --  8 9   AST U/L  --   --   --  29   ALT U/L  --   --   --  53   ALK PHOS U/L  --   --   --  162*   TOTAL PROTEIN g/dL  --   --   --  6 9   ALBUMIN g/dL  --   --   --  3 2*   BILIRUBIN TOTAL mg/dL  --   --   --  0 70   EGFR ml/min/1 73sq m 15 15  --  18       BMP:  Results from last 7 days  Lab Units 10/15/17  0436 10/15/17  0022  10/14/17  0753   SODIUM mmol/L 142 141  --  137   POTASSIUM mmol/L 4 0 4 4  --  5 8*   CHLORIDE mmol/L 105 105  --  103   CO2 mmol/L 26 26  --  25   BUN mg/dL 73* 71*  --  60*   CREATININE mg/dL 3 59* 3 58*  --  3 18*   GLUCOSE RANDOM mg/dL 149* 141*  --  256*   GLUCOSE, ISTAT   --   --   < >  --    CALCIUM mg/dL 8 5 8 7  --  8 9   < > = values in this interval not displayed        Results from last 7 days  Lab Units 10/14/17  0753   NT-PRO BNP pg/mL 7,446*          Results from last 7 days  Lab Units 10/15/17  0436 10/15/17  0025 10/14/17  0753   MAGNESIUM mg/dL 2 0 2 2 2 2               Results from last 7 days  Lab Units 10/14/17  0753   TSH 3RD GENERATON uIU/mL 2 496             Lipid Profile:   Lab Results   Component Value Date    CHOL 99 09/05/2017    CHOL 96 04/14/2017    CHOL 236 (H) 03/30/2016     Lab Results   Component Value Date    HDL 37 (L) 09/05/2017    HDL 45 04/14/2017    HDL 33 (L) 03/30/2016     Lab Results   Component Value Date    LDLCALC 39 2017    LDLCALC 34 2017    LDLCALC 139 (H) 2016     Lab Results   Component Value Date    TRIG 115 2017    TRIG 85 2017    TRIG 319 (H) 2016       Cardiac testing:   Results for orders placed during the hospital encounter of 17   Echo complete with contrast if indicated    Narrative [address] [place] [id number]  [phone]    Transthoracic Echocardiogram  2D, M-mode, Doppler, and Color Doppler    Study date:  2017    Patient: Jaylon Duvall  MR number: [id number]  Account number: [de-identified]  : 1940  Age: 67 years  Gender: Male  Status: Inpatient  Location: Emergency room  Height: 65 in  Weight: 182 lb  BP: 138/ 65 mmHg    Indications: heart failure, Assess left ventricular function  Diagnoses: I50 9 - Heart failure, unspecified    Sonographer:   Calvin Camarillo RDCS  Interpreting Physician:  Christian Pabon MD  Primary Physician:  Gillian Meier PA-C  Referring Physician:  Christian Pabon MD  Group:  [facility]    SUMMARY    LEFT VENTRICLE:  Systolic function was severely reduced  Ejection fraction was estimated to be 35 %  There was akinesis of the basal inferolateral wall(s)  There was akinesis of the mid inferolateral wall(s)  There was akinesis of the basal inferior wall(s)  There was severe hypokinesis of the mid inferior wall(s)  There was mild concentric hypertrophy  Features were consistent with a pseudonormal left ventricular filling pattern, with concomitant abnormal relaxation and increased filling pressure (grade 2 diastolic dysfunction)  LEFT ATRIUM:  The atrium was mildly dilated  MITRAL VALVE:  There was moderate regurgitation  TRICUSPID VALVE:  There was mild regurgitation  PULMONIC VALVE:  There was mild regurgitation  HISTORY: PRIOR HISTORY: Congestive heart failure  Risk factors: hypertension and diabetes   PRIOR PROCEDURES: Pacemaker implantation  PROCEDURE: The procedure was performed in the emergency room  This was a routine study  The transthoracic approach was used  The study included complete 2D imaging, M-mode, complete spectral Doppler, and color Doppler  The heart rate was  68 bpm, at the start of the study  Images were obtained from the parasternal, apical, subcostal, and suprasternal notch acoustic windows  Echocardiographic views were limited due to poor acoustic window availability, decreased penetration,  and lung interference  This was a technically difficult study  LEFT VENTRICLE: Systolic function was severely reduced  Ejection fraction was estimated to be 35 %  There was akinesis of the basal inferolateral wall(s)  There was akinesis of the mid inferolateral wall(s)  There was akinesis of the basal  inferior wall(s)  There was severe hypokinesis of the mid inferior wall(s)  There was mild concentric hypertrophy  DOPPLER: Features were consistent with a pseudonormal left ventricular filling pattern, with concomitant abnormal relaxation  and increased filling pressure (grade 2 diastolic dysfunction)  RIGHT VENTRICLE: The size was normal  Systolic function was normal  Wall thickness was normal     LEFT ATRIUM: The atrium was mildly dilated  RIGHT ATRIUM: Size was normal  A pacing wire was present  MITRAL VALVE: DOPPLER: There was moderate regurgitation  AORTIC VALVE: Leaflets exhibited mild calcification  TRICUSPID VALVE: The valve structure was normal  There was normal leaflet separation  DOPPLER: The transtricuspid velocity was within the normal range  There was no evidence for stenosis  There was mild regurgitation  PULMONIC VALVE: DOPPLER: There was mild regurgitation  PERICARDIUM: There was no pericardial effusion  The pericardium was normal in appearance  AORTA: The root exhibited normal size  SYSTEMIC VEINS: IVC: The inferior vena cava was normal in size and course  Respirophasic changes were normal     SYSTEM MEASUREMENT TABLES    2D  %FS: 5 4 %  Ao Diam: 2 7 cm  EDV(Teich): 205 5 ml  EF Biplane: 42 6 %  EF(Teich): 11 8 %  ESV(Teich): 181 3 ml  HR_2Ch_Q: 67 3 BPM  HR_4Ch_Q: 68 8 BPM  IVSd: 1 2 cm  LA Area: 20 2 cm2  LA Diam: 4 9 cm  LVCO_2Ch_Q: 4 4 L/min  LVCO_4Ch_Q: 4 6 L/min  LVCO_BiP_Q: 4 2 L/min  LVEDV MOD A2C: 252 7 ml  LVEDV MOD A4C: 189 8 ml  LVEDV MOD BP: 218 2 ml  LVEF MOD A2C: 35 4 %  LVEF MOD A4C: 51 9 %  LVEF_2Ch_Q: 25 9 %  LVEF_4Ch_Q: 34 6 %  LVEF_BiP_Q: 28 7 %  LVESV MOD A2C: 163 3 ml  LVESV MOD A4C: 91 3 ml  LVESV MOD BP: 125 3 ml  LVIDd: 6 4 cm  LVIDs: 6 cm  LVLd A2C: 10 1 cm  LVLd A4C: 10 2 cm  LVLd_2Ch_Q: 10 5 cm  LVLd_4Ch_Q: 9 9 cm  LVLs A2C: 9 1 cm  LVLs A4C: 8 6 cm  LVLs_2Ch_Q: 9 5 cm  LVLs_4Ch_Q: 8 7 cm  LVPWd: 1 cm  LVSV_2Ch_Q: 65 2 ml  LVSV_4Ch_Q: 66 4 ml  LVSV_BiP_Q: 63 1 ml  LVVED_2Ch_Q: 252 1 ml  LVVED_4Ch_Q: 191 6 ml  LVVED_BiP_Q: 220 ml  LVVES_2Ch_Q: 186 9 ml  LVVES_4Ch_Q: 125 2 ml  LVVES_BiP_Q: 156 9 ml  RA Area: 18 1 cm2  RVIDd: 4 2 cm  SV MOD A2C: 89 4 ml  SV MOD A4C: 98 5 ml  SV(Teich): 24 2 ml    CW  AR Dec Rooks: 2 6 m/s2  AR Dec Time: 1557 ms  AR PHT: 451 5 ms  AR Vmax: 4 m/s  AR maxP 8 mmHg  MR VTI: 205 8 cm  MR Vmax: 5 5 m/s  MR Vmean: 4 1 m/s  MR maxP 9 mmHg  MR meanP 7 mmHg  TR Vmax: 3 1 m/s  TR maxP 8 mmHg    MM  TAPSE: 2 cm    PW  AVC: 399 3 ms  MV A Nicholas: 1 1 m/s  MV Dec Rooks: 9 1 m/s2  MV DecT: 152 2 ms  MV E Nicholas: 1 4 m/s  MV E/A Ratio: 1 2  MV PHT: 44 2 ms  MVA By PHT: 5 cm2    [facility] Accredited Echocardi[facility]    Prepared and electronically signed by    Liudmila Hernandez MD  Signed 2017 17:50:57         Meds/Allergies   all current active meds have been reviewed  Prescriptions Prior to Admission   Medication    oxyCODONE-acetaminophen (PERCOCET) 5-325 mg per tablet    allopurinol (ZYLOPRIM) 100 mg tablet    amiodarone 200 mg tablet    aspirin (ECOTRIN LOW STRENGTH) 81 mg EC tablet    carvedilol (COREG) 25 mg tablet    clopidogrel (PLAVIX) 75 mg tablet    furosemide (LASIX) 80 mg tablet    insulin glargine (LANTUS) 100 units/mL subcutaneous injection    insulin lispro (HumaLOG) 100 units/mL injection    losartan (COZAAR) 25 mg tablet    pantoprazole (PROTONIX) 40 mg tablet    rasagiline (AZILECT) 1 MG    rosuvastatin (CRESTOR) 20 MG tablet    tamsulosin (FLOMAX) 0 4 mg         furosemide 10 mg/hr Last Rate: 10 mg/hr (10/14/17 2051)         Assessment and Plan:  1  Systolic congestive heart failure/cardiomyopathy status post ICD  -EF 35% per echo 04/2017  -patient was given 1 dose of IV diuretics yesterday as well as high dose of oral Lasix  However, he developed significant respiratory distress overnight has since been transferred to the ICU  He is now on Lasix drip with symptomatic improvement  -creatinine elevated above previous 09/2017  Nephrology following  -continue daily weights, salt restriction, I's and O's  -continue carvedilol  No ACE/Arb due to CKD     2   Elevated troponin  0 66/0 77/0 90/0 93/0 95/1 09/1 29/1 18  -likely represents post elevated troponin due to CKD + NSTEMI type 2 secondary to hypoxia/respiratory distress      3  History of CAD status post CABG- no recent anginal symptoms  Continue aspirin, statin, beta-blocker    ** Please Note: Dragon 360 Dictation voice to text software may have been used in the creation of this document   **

## 2017-10-15 NOTE — PHYSICAL THERAPY NOTE
Physical Therapy Treatment Note       10/15/17 0935   Pain Assessment   Pain Assessment No/denies pain   Pain Score No Pain   Restrictions/Precautions   Weight Bearing Precautions Per Order No   Braces or Orthoses (none per pt)   Other Precautions O2;Telemetry; Fall Risk;Limb alert  (6L O2 NC)   General   Chart Reviewed Yes   Response to Previous Treatment Patient with no complaints from previous session  Family/Caregiver Present No   Cognition   Overall Cognitive Status WFL   Arousal/Participation Alert; Cooperative   Attention Within functional limits   Orientation Level Oriented X4   Memory Within functional limits   Following Commands Follows all commands and directions without difficulty   Comments pleasant, cooperative   Subjective   Subjective "I feel weak"   Ambulation/Elevation   Gait pattern Decreased foot clearance; Forward Flexion; Shuffling; Short stride; Step to;Excessively slow  (improved sarita w/ RW)   Gait Assistance 5  Supervision   Additional items Assist x 1;Verbal cues; Tactile cues  (for RW management)   Assistive Device Rolling walker   Distance 15' x4 w/ standing rest between 2* SOB/GARDNER, vc for deep breathing   Stair Management Assistance Not tested   Balance   Static Sitting Good   Dynamic Sitting Fair +   Static Standing Fair +   Dynamic Standing Fair   Ambulatory Fair  (w/ RW)   Endurance Deficit   Endurance Deficit Yes   Endurance Deficit Description (+) SOB/GARDNER   Activity Tolerance   Activity Tolerance Patient limited by fatigue;Treatment limited secondary to medical complications (Comment)   Nurse Made Aware yes, DEE Trevino verbalized pt appropriate to see, made aware of session outcome/recs   Exercises   Heelslides Sitting;10 reps;AROM; Bilateral   Glute Sets Sitting;10 reps;AROM; Bilateral   Hip Abduction Sitting;10 reps;AROM; Bilateral   Hip Adduction Sitting;10 reps;AROM; Bilateral   Knee AROM Long Arc Quad Sitting;10 reps;AROM; Bilateral   Ankle Pumps Sitting;10 reps;AROM; Bilateral Marching Sitting;10 reps;AROM; Bilateral   Assessment   Prognosis Good   Problem List Decreased strength;Decreased endurance; Impaired balance;Decreased mobility   Assessment Pt seen for PT treatment session this date with interventions consisting of gait training w/ emphasis on improving pt's ability to ambulate level surfaces x 15' x4 w/ standing rest between 2* fatigue/SOB ft with SBA provided by therapist with RW and Therapeutic exercise consisting of: AROM 10 B LE in sitting position  Pt requiring frequent rest breaks throughout session 2* fatigue/SOB/GARDNER  Pt in no apparent distress, A&O x 4 and responsive  In comparison to previous session, pt with improvements in initiation of B LE Elba, note pt's gait pattern more symmetrical and stable when utilizing RW vs no AD (continue using for now)  B LE HEP handout provided and reviewed for Elba as demonstrated above , Pt w/ good carryover for HEP, verbalizing understanding  PT to assess carryover next session  , Post session: pt returned back to recliner, all needs in reach and RN notified of session findings/recommendations and NSG staff educated/encouraged to mobilize ad maryuri to maximize endurance gains w/ use of RW and staff A Continue to recommend Home PT at time of d/c in order to maximize pt's functional independence and safety w/ mobility  Pt continues to be functioning below baseline level, and remains limited 2* factors listed above and including at risk for falls  PT will continue to see pt while here in order to address the deficits listed above and provide interventions consistent w/ POC in effort to achieve STGs  Barriers to Discharge None   Barriers to Discharge Comments (+) PABLO   Goals   Patient Goals to return home   STG Expiration Date 10/25/17   Treatment Day 1   Plan   Treatment/Interventions Functional transfer training;LE strengthening/ROM; Elevations; Therapeutic exercise; Endurance training;Patient/family training;Equipment eval/education; Bed mobility;Gait training;Spoke to nursing   Progress Slow progress, decreased activity tolerance   PT Frequency 5x/wk   Recommendation   Recommendation Home PT; Home with family support  (anticipated pending progress)   Equipment Recommended Other (Comment)  (TBD, continue RW vs SPC)   PT - OK to Discharge No   Additional Comments Pt to demonstrate consistency w/ level surface mobility and clear stairs prior to safe d/c disposition home         Kirby Section, PT, DPT    Time of PT treatment session: [phone]

## 2017-10-15 NOTE — CASE MANAGEMENT
Initial Clinical Review    Admission: Date/Time/Statement: 10/14/17 @ 0855     Orders Placed This Encounter   Procedures    Inpatient Admission (expected length of stay for this patient is greater than two midnights)     Standing Status:   Standing     Number of Occurrences:   1     Order Specific Question:   Admitting Physician     Answer:   Zachary Gomez [[id number]]     Order Specific Question:   Level of Care     Answer:   Med Surg [16]     Order Specific Question:   Estimated length of stay     Answer:   More than 2 Midnights     Order Specific Question:   Certification     Answer:   I certify that inpatient services are medically necessary for this patient for a duration of greater than two midnights  See H&P and MD Progress Notes for additional information about the patient's course of treatment  ED: Date/Time/Mode of Arrival:   ED Arrival Information     Expected Arrival Acuity Means of Arrival Escorted By Service Admission Type    - 10/14/2017 07:31 Urgent Ambulance [facility]) Critical Care/ICU Urgent    Arrival Complaint    SOB        Chief Complaint:   Chief Complaint   Patient presents with    Shortness of Breath     Pt started with SOB last night  Pt has hx of CHF  Pt denies any chest pain, c/o weakness  History of Illness: 65 y o  male with past medical history pertinent for CHF, diabetes , hypertension, Parkinson's disease and CKD stage 4 who presents with acute 1 day onset of shortness of breath, with no associated chest pain, dizziness, palpitations, syncope or leg swelling    Patient recently had a change in his medication regimen, his Lasix, and Coreg were both decreased  Patient has underlying Parkinson's disease and tends to have low blood pressure  He was recently seen by his neurologist, nephrologist and cardiologist  On presentation in the ED patient was hypoxic at 88% on room air, chest x-ray preliminary per ED attending visualized vascular congestion ,he received 80 mg IV Lasix, he reports it did not improve his breathing status  Currently patient states he feels that he cannot get a breath in or out  ED Vital Signs:   ED Triage Vitals   Temperature Pulse Respirations Blood Pressure SpO2   10/14/17 0734 10/14/17 0734 10/14/17 0734 10/14/17 0734 10/14/17 0734   97 9 °F (36 6 °C) 67 19 94/52 92 %      Temp Source Heart Rate Source Patient Position - Orthostatic VS BP Location FiO2 (%)   10/14/17 0734 10/14/17 1532 10/14/17 0734 10/14/17 0734 --   Temporal Monitor Lying Right arm       Pain Score       10/14/17 0734       No Pain        Wt Readings from Last 1 Encounters:   10/15/17 76 7 kg (169 lb 1 5 oz)     Vital Signs (abnormal):  Date/Time  Temp  Pulse  Resp  BP  MAP (mmHg)  SpO2  O2 Device  Patient Position - Orthostatic VS   10/15/17 1305  --  --  --  --  --  93 %  None (Room air)  --   10/15/17 1115  98 1 °F (36 7 °C)  87   36  103/57  74  95 %  --  --   10/15/17 0830  --  78   30  100/55  72  94 %  --  --   10/15/17 0815  --  77   35  --  --  96 %  Nasal cannula  Lying   10/15/17 0800  97 9 °F (36 6 °C)  78   23  97/52  77  100 %  Other (comment)  Lying     10/14/17 0747  --  --  --  --  --  95 %  Nasal cannula  --   10/14/17 0746  --  --  --  --  --   88 %  None (Room air)  --   10/14/17 0745  --  66  20  94/52  --   88 %  --       Abnormal Labs:   Updated: 10/14/17 0840       NT-proBNP 7,446 (H) <450 pg/mL     Updated: 10/14/17 0840       Troponin I 0 66 (H) <=0 04 ng/mL      BUN 60 (H) 5 - 25 mg/dL     Creatinine 3 18 (H) 0 60 - 1 30 mg/dL      Updated: 10/14/17 0810       WBC 11 18 (H) 4 31 - 10 16 Thousand/uL     Updated: 10/15/17 0046       Troponin I 1 09 (H) <=0 04 ng/mL     Diagnostic Test Results: CXR - Mild interstitial edema suggested which may be chronic and/or recurrent      ED Treatment:   Medication Administration from 10/14/2017 0731 to 10/14/2017 1008       Date/Time Order Dose Route Action     10/14/2017 0859 furosemide (LASIX) injection 80 mg 80 mg Intravenous Given     10/14/2017 0859 aspirin chewable tablet 324 mg 324 mg Oral Given     Past Medical/Surgical History: Active Ambulatory Problems     Diagnosis Date Noted    [facility] on chronic diastolic [facility]congest[facility] 04/13/2017    Essential hypertension 04/13/2017    Chronic low back pain without sciatica 04/13/2017    Hyperkalemia 04/13/2017    Diabetes mellitus with hyperglycemia, with long-term current use of insulin ([facility] ) 04/13/2017    Thrombocytopenia ([facility] ) 04/13/2017    Cardiac defibrillator in place 04/13/2017    Stage 4 chronic kidney disease ([facility] ) 04/17/2017    Hypertensive CKD (chronic kidney disease) 04/17/2017    Secondary hyperparathyroidism of renal origin ([facility] ) 04/17/2017     Resolved Ambulatory Problems     Diagnosis Date Noted    CKD (chronic kidney disease) 04/13/2017     Past Medical History:   Diagnosis Date    Diabetes mellitus ([facility] )     Gout     History of MI (myocardial infarction)     Hyperlipidemia     Hypertension     Parkinson disease ([place] [address] )     Renal disorder      Admitting Diagnosis: Hypoxemia [R09 02]  Hyperkalemia [E87 5]  SOB (shortness of breath) [R06 02]  Elevated troponin [R74 8]  Acute on chronic combined systolic and diastolic congestive heart failure (HCC) [I50 43]  Generalized weakness [R53 1]    Age/Sex: 65 y o  male    Assessment/Plan:   Hospital Problem List:      Principal Problem:    Acute on chronic diastolic CHF (congestive heart failure) (HCC)  Active Problems:    Essential hypertension    Hyperkalemia    Diabetes mellitus with hyperglycemia, with long-term current use of insulin (HCC)    Cardiac defibrillator in place    Stage 4 chronic kidney disease ([facility] )    Parkinson disease ([facility] )    Elevated troponin     Plan for the Primary Problem(s):  · Acute on chronic diastolic CHF  ? Most likely due to decrease in Lasix recently, with concomitant chronic kidney disease  ?  Cardiology consultation, patient symptomatic still, Lasix 80 mg p o  b i d  was ordered by Cardiology next dose now  ? Trend troponin  ? Telemetry  ? Low-sodium diet  ? Oxygen to keep saturation above 94%  ? PT evaluation  ? Further recommendations per Cardiology     Plan for Additional Problems:   · Essential hypertension  With underlying Parkinson's disease, suspect has component of autonomic dysfunction  Check orthostatic vitals  Continue Coreg, any changes per Cardiology  Low-sodium diet     · Hyperkalemia  Mild, likely in setting of worsened renal function  Hold off with any IV fluids given CHF exacerbation  Consult Nephrology and trend levels     · Diabetes mellitus with hyperglycemia with long-term current use of insulin  Will continue home dose of Lantus and short-acting mealtime insulin  Add additional mealtime coverage sliding scale if needed     · Cardiac defibrillator in place  Patient denies any malfunction, random firing     · Stage 4 chronic kidney disease  Baseline appears around 2 8, increased on presentation, likely cardiorenal syndrome  Diuresis per Cardiology, consult Nephrology, caution with IV hydration     · Parkinson's disease  Continue Azilect  PT evaluation  Orthostatic vitals     · Elevated troponin  Likely in setting of volume overload, present in past in similar range  Consult Cardiology  Trend troponin     VTE Prophylaxis: Heparin  / sequential compression device   Code Status:  Full  POLST: There is no POLST form on file for this patient (pre-hospital)     Anticipated Length of Stay:  Patient will be admitted on an Inpatient basis with an anticipated length of stay of  more than 2 midnights  Justification for Hospital Stay:  Acute CHF exacerbation       Admission Orders:   BiPAP  Orthostatic B/P  Cardiology cons  Nephrology cons  PT eval and treat    Scheduled Meds:   heparin (porcine) 5,000 Units Subcutaneous Q8H [address]   insulin lispro 1-6 Units Subcutaneous TID AC   insulin lispro 5 Units Subcutaneous TID With Meals   ipratropium 0 5 mg Nebulization TID   levalbuterol 1 25 mg Nebulization TID   metolazone 5 mg Oral Daily     Continuous Infusions:   furosemide 20 mg/hr Last Rate: 10 mg/hr (10/14/17 2051)     PRN Meds:

## 2017-10-15 NOTE — PHYSICAL THERAPY NOTE
Physical Therapy Evaluation    Patient's Name: Deepali López    Admitting Diagnosis  Hypoxemia [R09 02]  Hyperkalemia [E87 5]  SOB (shortness of breath) [R06 02]  Elevated troponin [R74 8]  Acute on chronic combined systolic and diastolic congestive heart failure (HCC) [I50 43]  Generalized weakness [R53 1]    Problem List  Patient Active Problem List   Diagnosis    [facility] on [facility]est[facility]) [facility])    Essential hypertension    Chronic low back pain without sciatica    Hyperkalemia    Diabetes mellitus with hyperglycemia, with long-term current use of insulin ([facility])    [facility]    Cardiac defibrillator in place    Stage 4 chronic kidney disease ([facility] )    Hypertensive CKD (chronic kidney disease)    Secondary hyperparathyroidism of renal origin ([facility] )    [facility] disease [facility]    Elevated troponin    MADELIN (acute kidney injury) ([facility] )    Hypertensive CKD (chronic kidney disease)       Past Medical History  Past Medical History:   Diagnosis Date    [facility] mellitus [facility] )     Gout     History of MI (myocardial infarction)     x3    Hyperlipidemia     Hypertension     Parkinson disease ([facility] )     Renal disorder        Past Surgical History  Past Surgical History:   Procedure Laterality Date    APPENDECTOMY      CARDIAC PACEMAKER PLACEMENT      CORONARY ARTERY BYPASS GRAFT      TONSILLECTOMY          10/15/17 0915   Note Type   Note type Eval/Treat   Pain Assessment   Pain Assessment No/denies pain   Pain Score No Pain   Home Living   Type of [address] [place] [address] One level;Stairs to enter with rails; Performs ADLs on one level; Able to live on main level with bedroom/bathroom; Laundry in basement  (4 PABLO via front)   Bathroom Shower/Tub Tub/shower unit   Bathroom Toilet Standard   Bathroom Equipment (no DME at baseline)   [address]  (uses SPC prn, and when walks next door to sister's house)   Additional Comments uses "shopping cart" for support when shopping in community/grocery shopping   Prior Function   Level of [place] Independent with ADLs and functional mobility   Lives With Spouse  (able to provide A per pt)   Receives Help From Family   ADL Assistance Independent   IADLs Needs assistance  (splits IADLs w/ wife, shares cooking, wife does laundry)   Falls in the last 6 months 0  ((+) fall history)   Vocational Retired  ([employer])   Comments (+) driving   Restrictions/Precautions   [place] Bearing Precautions Per Order No   Braces or Orthoses (none per pt)   Other Precautions O2;Telemetry; Fall Risk;Limb alert  (6L O2 NC- not on O2 NC at baseline per pt)   General   Family/Caregiver Present No   Cognition   Overall Cognitive Status WFL   Arousal/Participation Alert   Attention Within functional limits   Orientation Level Oriented X4   Memory Within functional limits   Following Commands Follows all commands and directions without difficulty   Comments pleasant, cooperative   RUE Assessment   RUE Assessment WFL   RUE Strength   RUE Overall Strength Within Functional Limits - able to perform ADL tasks with strength   LUE Assessment   LUE Assessment WFL   LUE Strength   LUE Overall Strength Within Functional Limits - able to perform ADL tasks with strength   RLE Assessment   RLE Assessment WFL  (grossly 3+/5)   LLE Assessment   LLE Assessment WFL  (grossly 3+/5)   Coordination   Movements are Fluid and Coordinated 1  (B finger opposition intact)   Sensation WFL  (pt denying any numbness/tingling)   Light Touch   RLE Light Touch Grossly intact   LLE Light Touch Grossly intact   Bed Mobility   Supine to Sit 5  Supervision   Additional items Assist x 1;HOB elevated; Bedrails; Increased time required;Verbal cues   Sit to Supine Unable to assess  (pt OOB to recliner post session, all needs in reach)   Additional Comments vitals pre mobility: 94% on 6L O2 NC, 100/55mmHg, 83bpm  SpO2 dropping to low 80s on 6L O2 NC during ambulation   Transfers   Sit to Stand 5  Supervision   Additional items Assist x 1; Increased time required;Verbal cues   Stand to Sit 5  Supervision   Additional items Assist x 1; Increased time required;Verbal cues   Ambulation/Elevation   Gait pattern Decreased foot clearance; Forward Flexion; Shuffling; Short stride; Step to;Excessively slow   Gait Assistance 5  Supervision  (CGA)   Additional items Assist x 1; Tactile cues; Verbal cues   Assistive Device None   Distance 10'   Stair Management Assistance Not tested   Balance   Static Sitting Good   Dynamic Sitting Fair +   Static Standing Fair +   Dynamic Standing Fair   Ambulatory Fair   Endurance Deficit   Endurance Deficit Yes   Endurance Deficit Description (+) SOB/GARDNER   Activity Tolerance   Activity Tolerance Patient limited by fatigue;Treatment limited secondary to medical complications (Comment)   Nurse Made Aware yes, RN Mikal Stahl verbalized pt appropriate to see, made aware of session outcome/recs   Assessment   Prognosis Good   Problem List Decreased strength;Decreased endurance; Impaired balance;Decreased mobility   Assessment Pt is 65 y o  male seen for PT evaluation on 10/15/2017 s/p admit to [facility] on 10/14/2017 w/ Acute on chronic diastolic CHF (congestive heart failure) ([facility] )  Pt presents w/ acute onset of SOB  PT consulted to assess pt's functional mobility and d/c needs  Order placed for PT eval and tx, w/ up as tolerated order  Comorbidities affecting pt's physical performance at time of assessment include: CHF, DM, gout, h/o MI, HLD, HTN, Parkinson's disease, renal disorder  PTA, pt was independent w/ all functional mobility w/ prn SPC, ambulates unrestricted distances and all terrain, has 4 PABLO, lives w/ wife in 1 level home and retired   Personal factors affecting pt at time of IE include: inaccessible home environment, ambulating w/ assistive device, stairs to enter home, inability to navigate community distances, limited home support, positive fall history, decreased initiation and engagement, limited insight into impairments and inability to perform IADLs  Please find objective findings from PT assessment regarding body systems outlined above with impairments and limitations including weakness, impaired balance, decreased endurance, gait deviations, decreased activity tolerance, decreased safety awareness and fall risk, as well as mobility assessment (need for close SBA-CGA assist w/ all phases of mobility when usually ambulating independently and need for cueing for mobility technique)  The following objective measures performed on IE also reveal limitations: Barthel Index: 55/100 and Modified Morris: 3 (moderate disability)  Pt's clinical presentation is currently unstable/unpredictable seen in pt's presentation of need for input for task focus and mobility technique, on telemetry monitoring and unstable medical status  Pt to benefit from continued PT tx to address deficits as defined above and maximize level of functional independent mobility and consistency  From PT/mobility standpoint, recommendation at time of d/c would be Home PT w/ family support pending progress in order to facilitate return to PLOF     Barriers to Discharge None   Barriers to Discharge Comments (+) PABLO   Goals   Patient Goals to return home   STG Expiration Date 10/25/17   Short Term Goal #1 In 7-10 days: Increase bilateral LE strength 1/2 grade to facilitate independent mobility, Perform all bed mobility tasks independently to decrease caregiver burden, Perform all transfers independently to improve independence, Ambulate > 150 ft  with least restrictive assistive device independently w/o LOB and w/ normalized gait pattern 100% of the time, Navigate 4 stairs independently with unilateral handrail to facilitate return to previous living environment, Increase all balance 1/2 grade to decrease risk for falls, Complete exercise program independently, Tolerate 4 hr OOB to faciliate upright tolerance and Improve Barthel Index score to 70 or greater to facilitate independence   Treatment Day 0   Plan   Treatment/Interventions Functional transfer training;LE strengthening/ROM; Elevations; Therapeutic exercise; Endurance training;Patient/family training;Equipment eval/education; Bed mobility;Gait training;Spoke to nursing   PT Frequency 5x/wk   Recommendation   Recommendation Home PT; Home with family support  (anticipated pending progress)   Equipment Recommended (TBD, continue RW vs SPC)   PT - OK to Discharge No   Additional Comments Pt to demonstrate consistency w/ level surface mobility and clear stairs prior to safe d/c disposition home     Modified [place] Scale   Modified Amanda Scale 3   Barthel Index   Feeding 10   Bathing 5   Grooming Score 5   Dressing Score 5   Bladder Score 0  (catheter)   Bowels Score 10   Toilet Use Score 10   Transfers (Bed/Chair) Score 10   Mobility (Level Surface) Score 0   Stairs Score 0   Barthel Index Score 55         Sara Hew, PT, DPT

## 2017-10-15 NOTE — TREATMENT PLAN
Phone consent obtained from patient's wife for central line placement and arterial line placement  Phone consent Witnessed by JOSE MIGUEL Cazares MD

## 2017-10-15 NOTE — PLAN OF CARE
CARDIOVASCULAR - ADULT     Maintains optimal cardiac output and hemodynamic stability Progressing     Absence of cardiac dysrhythmias or at baseline rhythm Progressing        DISCHARGE PLANNING     Discharge to home or other facility with appropriate resources Progressing        GENITOURINARY - ADULT     Maintains or returns to baseline urinary function Progressing     Absence of urinary retention Progressing     Urinary catheter remains patent Progressing        Knowledge Deficit     Patient/family/caregiver demonstrates understanding of disease process, treatment plan, medications, and discharge instructions Progressing        METABOLIC, FLUID AND ELECTROLYTES - ADULT     Electrolytes maintained within normal limits Progressing     Fluid balance maintained Progressing     Glucose maintained within target range Progressing        MUSCULOSKELETAL - ADULT     Maintain or return mobility to safest level of function Progressing     Maintain proper alignment of affected body part Progressing        PAIN - ADULT     Verbalizes/displays adequate comfort level or baseline comfort level Progressing        Potential for Falls     Patient will remain free of falls Progressing        Prexisting or High Potential for Compromised Skin Integrity     Skin integrity is maintained or improved Progressing        RESPIRATORY - ADULT     Achieves optimal ventilation and oxygenation Progressing        SAFETY ADULT     Maintain or return to baseline ADL function Progressing     Maintain or return mobility status to optimal level Progressing     Patient will remain free of falls Progressing

## 2017-10-15 NOTE — SOCIAL WORK
PT eval rec home PT  cm met w pt who resides in [facility] his wife, Salvatore Westbrook  he reports Salvatore Westbrook is POA, but that they are unable to locate paperwork at this time  cm advised him on where to and how to for new paperwork  resides in one story home  ind w adls  cane prn  drives  uses [facility] [address] for RXs  reports no issues obtaining medications  refuses in home VNA  preference outpatient   sticky note left for MD  wife will transport home at [place]  no other reported needs at this time

## 2017-10-15 NOTE — PROGRESS NOTES
NEPHROLOGY PROGRESS NOTE    Patient: Gabi Saldivar               Sex: male          DOA: 10/14/2017  7:32 AM   YOB: 1940        Age:  65 y o         LOS:  LOS: 1 day     REASON FOR THE CONSULTATION:  Further management of MADELIN    HPI     This is a 78-year-old male with a past medical history of CKD stage 4, systolic heart failure, coronary artery disease s/p CABG, hypertension, diabetes type 2, admitted for further management for shortness of breath     SUBJECTIVE     - patient had rapid response yesterday due to altered mental status and was transferred to ICU and required brief period of BiPAP  Patient is also currently on Lasix drip 10 mg an hour and also found to be nonoliguric  Today patient said that he is feeling much better and his breathing has also improved  Patient denies nausea / vomiting / headache / dizziness / SOB / chest pain today    - Reviewed last 24 hrs events     CURRENT MEDICATIONS       Current Facility-Administered Medications:     furosemide (LASIX) 500 mg infusion 50 mL, 10 mg/hr, Intravenous, Continuous, JOSE MIGUEL Romero, Last Rate: 1 mL/hr at 10/14/17 2051, 10 mg/hr at 10/14/17 2051    heparin (porcine) subcutaneous injection 5,000 Units, 5,000 Units, Subcutaneous, Q8H [address], 5,000 Units at 10/15/17 0545 **AND** Platelet count, , , Once, Werner Jo MD    insulin lispro (HumaLOG) 100 units/mL subcutaneous injection 1-6 Units, 1-6 Units, Subcutaneous, TID AC **AND** Fingerstick Glucose (POCT), , , TID AC, JOSE MIGUEL Dotson    insulin lispro (HumaLOG) 100 units/mL subcutaneous injection 5 Units, 5 Units, Subcutaneous, TID With Meals, Werner Jo MD, 5 Units at 10/14/17 1736    ipratropium (ATROVENT) 0 02 % inhalation solution 0 5 mg, 0 5 mg, Nebulization, TID, Werner Jo MD, 0 5 mg at 10/15/17 0748    levalbuterol (XOPENEX) inhalation solution 1 25 mg, 1 25 mg, Nebulization, TID, Werner Jo MD, 1 25 mg at 10/15/17 0748    OBJECTIVE     Current Weight: Weight - Scale: 76 7 kg (169 lb 1 5 oz)  Vitals:    10/15/17 1115   BP: 103/57   Pulse: 87   Resp: (!) 36   Temp:    SpO2: 95%       Intake/Output Summary (Last 24 hours) at 10/15/17 1140  Last data filed at 10/15/17 1000   Gross per 24 hour   Intake            23 15 ml   Output             1130 ml   Net         -1106 85 ml       PHYSICAL EXAMINATION     GEN:  Awake and not in acute distress  RS:  Bilateral equal air entry, no wheezing  CVS:  S1-S2 heard  Abdomen:  Soft, nontender, positive bowel sounds  Extremities:  Trace edema, skin is warm on touch  CNS:  Awake and follows command  HEENT:  Pink conjunctiva, no JVD, head is atraumatic  Psychiatric:  Normal mood and affect, not suicidal  Musculoskeletal:  No bony deformities in lower extremities  Lymph Node:  No palpable cervical lymphadenopathy    LAB RESULTS       Results from last 7 days  Lab Units 10/15/17  0436 10/15/17  0025 10/15/17  0022 10/14/17  1932 10/14/17  1206 10/14/17  0753   WBC Thousand/uL 10 74*  --   --   --   --  11 18*   HEMOGLOBIN g/dL 12 8  --   --   --   --  13 4   I STAT HEMOGLOBIN g/dl  --   --   --  13 3  --   --    HEMATOCRIT % 38 2  --   --   --   --  41 3   PLATELETS Thousands/uL 143*  --   --   --  145* 155   SODIUM mmol/L 142  --  141  --   --  137   POTASSIUM mmol/L 4 0  --  4 4  --   --  5 8*   CHLORIDE mmol/L 105  --  105  --   --  103   CO2 mmol/L 26  --  26  --   --  25   BUN mg/dL 73*  --  71*  --   --  60*   CREATININE mg/dL 3 59*  --  3 58*  --   --  3 18*   EGFR ml/min/1 73sq m 15  --  15  --   --  18   CALCIUM mg/dL 8 5  --  8 7  --   --  8 9   MAGNESIUM mg/dL 2 0 2 2  --   --   --  2 2   ALBUMIN g/dL  --   --   --   --   --  3 2*   TOTAL PROTEIN g/dL  --   --   --   --   --  6 9   GLUCOSE RANDOM mg/dL 149*  --  141*  --   --  256*   GLUCOSE, ISTAT mg/dl  --   --   --  203*  --   --        I have personally reviewed the old medical records and patient's previously known baseline creatinine level is ~ 2 2-2 6    RADIOLOGY RESULTS      Results for orders placed during the hospital encounter of 10/14/17   XR chest 2 views    Narrative CHEST     INDICATION:  Shortness of breath  COMPARISON:  4/13/2017    VIEWS:  Frontal and lateral projections    IMAGES:  2    FINDINGS:         Heart shadow is enlarged but unchanged from prior exam   Status post median sternotomy  Left chest wall AICD device  Element of chronic or recurrent pulmonary venous hypertension may be present  No overt pulmonary edema or confluent infiltrates  No pneumothorax or discernible pleural effusions  Visualized osseous structures appear within normal limits for the patient's age  Impression Mild interstitial edema suggested which may be chronic and/or recurrent  No confluent infiltrates  Workstation performed: [id number]       Results for orders placed during the hospital encounter of 04/13/17   CT abdomen pelvis wo contrast    Narrative CT ABDOMEN AND PELVIS WITHOUT IV CONTRAST    INDICATION:  Lower back pain since falling last night  Worse today  COMPARISON: None  TECHNIQUE:  CT examination of the abdomen and pelvis was performed without intravenous contrast   Reformatted images were created in axial, sagittal, and coronal planes  Radiation dose length product (DLP) for this visit:  735 mGy-cm   This examination, like all CT scans performed in the [facility], was performed utilizing techniques to minimize radiation dose exposure, including the use of iterative   reconstruction and automated exposure control  FINDINGS:    ABDOMEN    LOWER CHEST:  There are minimal pleural effusions  There is cardiomegaly  There is a pacemaker  Bibasilar passive atelectatic changes are noted  LIVER/BILIARY TREE:  Unremarkable  GALLBLADDER:  No calcified gallstones  No pericholecystic inflammatory change  SPLEEN:  Unremarkable      PANCREAS: Unremarkable  ADRENAL GLANDS:  Unremarkable  KIDNEYS/URETERS:  No acute findings  There is some atrophy noted  STOMACH AND BOWEL:  There is colonic diverticulosis without diverticulitis  APPENDIX:  No findings to suggest appendicitis  ABDOMINOPELVIC CAVITY:  No ascites or free intraperitoneal air  No lymphadenopathy  VESSELS:  Atherosclerotic changes are present  No evidence of aneurysm  PELVIS    REPRODUCTIVE ORGANS:  Unremarkable for patient's age  URINARY BLADDER:  Unremarkable  ABDOMINAL WALL/INGUINAL REGIONS:  No traumatic injuries are seen  OSSEOUS STRUCTURES:  No acute fracture or destructive osseous lesion  Impression No acute traumatic findings  Trace pleural effusions and cardiomegaly  Mild renal atrophy      Workstation performed: [id number]       Results for orders placed in visit on 01/04/16   US retroperitoneal complete    Narrative EXAM ROOM =    EXAM START = [id number]   EXAM STOP = [id number]   FILMS USED =       RENAL ULTRASOUND      INDICATION- Kidney disease  COMPARISON- None  TECHNIQUE-   Ultrasound of the retroperitoneum was performed with a   curvilinear transducer utilizing volumetric sweeps and still imaging   techniques  FINDINGS-   KIDNEYS-   Right kidney-  10 9 x 4 4 cm  Normal echogenicity and contour  No suspicious masses detected  No hydronephrosis  No shadowing calculi  No perinephric fluid collections  Left kidney-  10 4 x 4 8 cm  Partial column of Aleksandar  Normal echogenicity and contour  No suspicious masses detected  No hydronephrosis  No shadowing calculi  No perinephric fluid collections  URETERS-   Nonvisualized  BLADDER-    Normally distended  No focal thickening or mass lesions  Neither ureteral jet visible  Prostate measures 5 5 x 6 1 x 4 9 cm  IMPRESSION-    Unremarkable kidneys  No hydronephrosis  Enlarged prostate        Transcribed on- [id number] - CHOLO Garcia DO        Reading Radiologist- CHOLO Garcia DO        Electronically Signed- CHOLO Garcia DO        Released Date Time- 01/05/16 1423    ------------------------------------------------------------------------------   READ BY = 2416^ROCKY VALDEZ   RELEASED BY = 4378^ROCKY VALDEZ        PLAN / RECOMMENDATIONS      1  MADELIN on top of CKD stage 4  Exact etiology has remained unclear and suspect secondary to underlying cardiorenal syndrome  Pending urine lytes  Patient was transferred overnight to ICU and was started on Lasix 10 mg/hour and found to be nonoliguric  Overnight renal function has also worsened to creatinine of 3 59  Plan to continue Lasix today and monitor renal function and follow up on urine lytes  May consider initiation of dialysis if renal function continue to worsen during the hospital stay  2   Hypertension in chronic kidney disease  Currently blood pressure is controlled and at goal and continue to avoid losartan for time being  Coreg was also stopped and currently blood pressure has been well controlled  3   Hyperkalemia  Resolved, currently on Lasix drip and nonoliguric  Current potassium is 4 0     4   Azotemia  Multifactorial, slowly worsening with the use of diuretics with current BUN of 73  Patient had altered mental status last night but his mentation has significantly improved with the use of BiPAP  Currently azotemia is asymptomatic and would continue current dose of Lasix and monitor BUN level  Plan was also d/w the ICU team    Bjorn Sykes MD  Nephrology  10/15/2017        Portions of the record may have been created with voice recognition software  Occasional wrong word or "sound a like" substitutions may have occurred due to the inherent limitations of voice recognition software  Read the chart carefully and recognize, using context, where substitutions have occurred

## 2017-10-16 NOTE — PHYSICAL THERAPY NOTE
Physical Therapy Cancellation Note    Chart review performed  At this time, PT treatment session cancelled per discussion with RN Cuco Marinelli- noting pt w/ worsened respiratory status, requesting PT cancel treat at this time  PT will follow and provide PT interventions as appropriate      Shawanda Suarez, PT, DPT

## 2017-10-16 NOTE — CONSULTS
GENERAL SURGERY CONSULT                                                                                                                                               Faith Silva 65 y o  male MRN:                                                                     [id number]  Unit/Bed#:                                                          Encounter: [id number]                                                        Assessment/Plan   Acute cholecystitis - imaging findings on ultrasound show gallbladder wall thickening and gallbladder sludge  - total bilirubin 3 1 from 0 7 on admission  - direct bilirubin 2  - WBC 9  - Hgb 10 8  Acute on chronic CHF, with elevated troponin  HTN  MADELIN on CKD - nephrology planning dialysis due to patient being unresponsive to diuretics  Parkinson's disease  ICD in place  Diabetes mellitus type 2  HLD  CAD with history of CABG    Plan:  - Plan for percutaneous william drain placement by IR  - IR consulted  - continue with medical management per Critical Care, Nephrology, and Cardiology    CHIEF COMPLAINT:  I had a lot of pain in my belly last night  HPI: Faith Silva is a 65y o  year old male with a past medical history of CKD stage 4, systolic CHF, CAD S/P CABG, ischemic cardiomyopathy s/P ICD placement, HTN, HLD, DM type 2, and Parkinson's disease consulted for acute cholecystectomy  He was admitted on 10/14/2017 with progressive weakness and shortness of breath after medication changed and found to be in an acute CHF exacerbation  He was also found to have MADELIN on CKD  On the evening of 10/15/2017 the patient experienced worsening abdominal pain and distention  Labs showed  elevated bilirubin  CT findings were concerning for acute cholecystitis and follow-up ultrasound showed gallbladder wall thickening and sludge  And the patient describes a history of intermittent diarrhea with constipation  Constipation sometimes lasting 3-4 days    He states this has been going on for years  He denies any current change in his bowel function  He denies change in stool color  He denies postprandial discomfort or abdominal pain  Patient states his pain has improved but he continues to have intermittent sharp right upper quadrant pain  He also states he has left-sided mid abdominal soreness  Patient with current shortness of breath, on nasal cannula  Nephrology and Cardiology are also following  Inpatient consult to Acute Care Surgery  Consult performed by: Cristal Smith  Consult ordered by: Comer Buerger          Historical Information   Past Medical History:   Diagnosis Date    Diabetes mellitus ([facility] )     Gout     History of MI (myocardial infarction)     x3    Hyperlipidemia     Hypertension     Parkinson disease ([facility] )     Renal disorder      Past Surgical History:   Procedure Laterality Date    APPENDECTOMY      CARDIAC PACEMAKER PLACEMENT      CORONARY ARTERY BYPASS GRAFT      TONSILLECTOMY       Social History   History   Alcohol Use No     History   Drug Use No     History   Smoking Status    Never Smoker   Smokeless Tobacco    Never Used     Family History:   Family History   Problem Relation Age of Onset    Family history unknown: Yes       No Known Allergies    Meds/Allergies   PTA meds:   Prior to Admission Medications   Prescriptions Last Dose Informant Patient Reported?  Taking?   allopurinol (ZYLOPRIM) 100 mg tablet   Yes No   Sig: Take 100 mg by mouth daily     amiodarone 200 mg tablet   Yes No   Sig: Take 200 mg by mouth daily     aspirin (ECOTRIN LOW STRENGTH) 81 mg EC tablet   Yes No   Sig: Take by mouth every other day   carvedilol (COREG) 25 mg tablet   Yes No   Sig: Take 25 mg by mouth daily     clopidogrel (PLAVIX) 75 mg tablet   Yes No   Sig: Take by mouth every other day     furosemide (LASIX) 80 mg tablet   No No   Sig: Take 1 tablet by mouth 2 (two) times a day for 30 days   Patient taking differently: Take 80 mg by mouth daily     insulin glargine (LANTUS) 100 units/mL subcutaneous injection   No No   Sig: Inject 10 Units under the skin daily at bedtime for 30 days   insulin lispro (HumaLOG) 100 units/mL injection   No No   Sig: Inject 5 Units under the skin 3 (three) times a day with meals for 30 days   losartan (COZAAR) 25 mg tablet   Yes No   Sig: Take by mouth daily     oxyCODONE-acetaminophen (PERCOCET) 5-325 mg per tablet   Yes Yes   Sig: Take 1 tablet by mouth every 6 (six) hours as needed for moderate pain   pantoprazole (PROTONIX) 40 mg tablet   Yes No   Sig: Take by mouth daily     rasagiline (AZILECT) 1 MG   Yes No   Sig: Take by mouth daily     rosuvastatin (CRESTOR) 20 MG tablet   Yes No   Sig: Take 10 mg by mouth every other day     tamsulosin (FLOMAX) 0 4 mg   Yes No   Sig: Take by mouth      Facility-Administered Medications: None              Objective   Vitals: Blood pressure 117/61, pulse 84, temperature 98 8 °F (37 1 °C), resp  rate (!) 32, height 5' 5" (1 651 m), weight 76 7 kg (169 lb 1 5 oz), SpO2 100 %  Intake/Output Summary (Last 24 hours) at 10/16/17 1220  Last data filed at 10/16/17 0601   Gross per 24 hour   Intake          2323 92 ml   Output             1325 ml   Net           998 92 ml     Invasive Devices     Peripheral Intravenous Line            Peripheral IV 10/15/17 Left Antecubital less than 1 day    Peripheral IV 10/15/17 Right Wrist less than 1 day          Arterial Line            Arterial Line 10/15/17 Radial less than 1 day          Drain            Urethral Catheter Temperature probe 16 Fr  1 day                Review of Systems   Constitutional: Positive for fatigue  Negative for fever  Respiratory: Positive for shortness of breath  Gastrointestinal: Positive for abdominal distention and abdominal pain (RUQ and mid left abdomen)  Negative for blood in stool, nausea and vomiting  Genitourinary: Positive for decreased urine volume and difficulty urinating     Musculoskeletal: Positive for arthralgias and back pain  Neurological: Positive for weakness  Physical Exam   Constitutional: He is oriented to person, place, and time  He appears well-developed and well-nourished  He appears distressed (mild)  HENT:   Head: Normocephalic and atraumatic  Nose: Nose normal    Eyes: Conjunctivae and EOM are normal  No scleral icterus  Neck: Normal range of motion  Neck supple  No tracheal deviation present  Cardiovascular: Normal rate, regular rhythm and intact distal pulses  Exam reveals no gallop and no friction rub  No murmur heard  Pulmonary/Chest: Accessory muscle usage present  Tachypnea noted  He has rales (diffuse)  He exhibits no tenderness  Patient visibly SOB with increased respirations   Abdominal: Bowel sounds are normal  He exhibits distension  There is tenderness (also tenderness to left mid abdomen) in the right upper quadrant  There is positive Barclay's sign  There is no guarding  A hernia (umbilical) is present  Neurological: He is alert and oriented to person, place, and time  Skin: Skin is warm and dry  Psychiatric: He has a normal mood and affect   His behavior is normal        Lab Results:   Lab Results   Component Value Date    WBC 9 05 10/16/2017    WBC 7 40 11/24/2015    HGB 10 4 (L) 10/16/2017    HGB 16 5 11/24/2015    HCT 33 8 (L) 10/16/2017    HCT 47 4 11/24/2015     (L) 10/16/2017     11/24/2015     Lab Results   Component Value Date     10/16/2017     11/24/2015    K 4 7 10/16/2017    K 3 7 11/24/2015     10/16/2017     11/24/2015    CO2 21 10/16/2017    CO2 31 11/24/2015    BUN 86 (H) 10/16/2017    BUN 37 (H) 11/24/2015    CREATININE 4 05 (H) 10/16/2017    CREATININE 2 12 (H) 11/24/2015    GLUCOSE 261 (H) 10/16/2017    GLUCOSE 203 (H) 10/14/2017    GLUCOSE 96 11/24/2015     Lab Results   Component Value Date    CALCIUM 8 5 10/16/2017    CALCIUM 9 2 11/24/2015    MG 2 6 10/16/2017    PHOS 3 0 09/18/2017    PHOS 3 0 11/24/2015 Lab Results   Component Value Date    AST 89 (H) 10/16/2017    AST 14 11/24/2015    ALT 85 (H) 10/16/2017    ALT 24 11/24/2015    ALKPHOS 145 (H) 10/16/2017    ALKPHOS 119 (H) 11/24/2015   Results for Geraldine Hurley (MRN [id number]) as of 10/16/2017 12:21   Ref  Range 10/16/2017 04:37   Total Bilirubin Latest Ref Range: 0 20 - 1 00 mg/dL 3 10 (H)   Results for Geraldine Hurley (MRN [id number]) as of 10/16/2017 12:21   Ref  Range 10/16/2017 04:37   BILIRUBIN DIRECT Latest Ref Range: 0 00 - 0 20 mg/dL 2 04 (H)   Results for Geraldine Hurley (MRN [id number]) as of 10/16/2017 12:21   Ref  Range 10/16/2017 04:38   LACTIC ACID Latest Ref Range: 0 5 - 2 0 mmol/L 2 2 (HH)       Lab Results   Component Value Date    COLORU Yellow 10/14/2017    COLORU Yellow 11/24/2015    CLARITYU Clear 10/14/2017    CLARITYU Cloudy 11/24/2015    SPECGRAV 1 020 10/14/2017    SPECGRAV 1 012 11/24/2015    PHUR 5 5 10/14/2017    PHUR 6 0 11/24/2015    GLUCOSEU Negative 10/14/2017    GLUCOSEU Negative 11/24/2015    KETONESU Negative 10/14/2017    KETONESU Negative 11/24/2015    BLOODU Trace-Intact (A) 10/14/2017    BLOODU Negative 11/24/2015    NITRITE Negative 10/14/2017    NITRITE Negative 11/24/2015    LEUKOCYTESUR Negative 10/14/2017    LEUKOCYTESUR Large (A) 11/24/2015    BILIRUBINUR Negative 10/14/2017    BILIRUBINUR Negative 11/24/2015    UROBILINOGEN 0 2 10/14/2017    UROBILINOGEN 0 2 11/24/2015    RBCUA 0-1 (A) 10/14/2017    RBCUA None Seen 11/24/2015    WBCUA 0-1 (A) 10/14/2017    WBCUA Innumerable (A) 11/24/2015    BACTERIA None Seen 10/14/2017    BACTERIA Occasional 11/24/2015     Lab Results   Component Value Date    TROPONINI 1 18 (H) 10/15/2017    BNP 62 12/16/2014         Imaging Studies:   Ct Chest Abdomen Pelvis Wo Contrast  Result Date: 10/16/2017  Impression: 1  Bibasilar pleural effusions with atelectasis and asymmetric pulmonary opacities, right greater than left  Findings likely reflect CHF with asymmetric pulmonary edema  Infiltrates less likely though not entirely excluded  2  Probable vicarious excretion of the gallbladder  Findings are consistent with the preliminary report from [facility] which was provided shortly after completion of the exam              Workstation performed: [id number]     Xr Chest Portable  Result Date: 10/16/2017  Impression: Interstitial prominence in the lungs suggest congestion, mildly decreased No worsening seen Workstation performed: [id number]     Xr Chest 2 Views  Result Date: 10/15/2017  Impression: Mild interstitial edema suggested which may be chronic and/or recurrent  No confluent infiltrates  Workstation performed: [id number]     Xr Chest Portable Ic  Result Date: 10/15/2017  Impression: Cardiomegaly with persistent central vascular prominence  Workstation performed: Brielle Rui Right Upper Quadrant  Result Date: 10/16/2017  Impression: Acute cholecystitis  Sludge/debris is present in the gallbladder but no definite obstructing stone is seen   ##imslh##imslh Workstation performed: Erum Arias [address] María Elena Vasquez PA-C  10/16/2017

## 2017-10-16 NOTE — BRIEF OP NOTE (RAD/CATH)
Perc cholecystostomy Procedure Note    PATIENT NAME: Brianne Rose  : 1940  MRN: [id number]     Pre-op Diagnosis:   1  Acute on chronic combined systolic and diastolic congestive heart failure ([facility] )    2  Hypoxemia    3  Elevated troponin    4  Hyperkalemia    5  Generalized weakness    6  Acute on chronic diastolic CHF (congestive heart failure) (HCC)    7  Stage 4 chronic kidney disease ([facility] )    8  BPH (benign prostatic hyperplasia)    9  Hematuria    10  Azotemia    11  MADELIN (acute kidney injury) ([facility] )    12  Transaminitis    13  Total bilirubin, elevated      Post-op Diagnosis:   1  Acute on chronic combined systolic and diastolic congestive heart failure ([facility] )    2  Hypoxemia    3  Elevated troponin    4  Hyperkalemia    5  Generalized weakness    6  Acute on chronic diastolic CHF (congestive heart failure) (HCC)    7  Stage 4 chronic kidney disease ([facility] )    8  BPH (benign prostatic hyperplasia)    9  Hematuria    10  Azotemia    11  MADELIN (acute kidney injury) ([facility] )    12  Transaminitis    13  Total bilirubin, elevated        Surgeon:   Tye Cameron MD    Estimated Blood Loss: none  Findings: 8Fr transhepatic perc william tube placed at bedside with US guidance   Bile does not appear overtly infected though GB wall is markedly abnormal      Specimens: cx    Complications:  none    Anesthesia: Conscious sedation    Tye Cameron MD     Date: 10/16/2017  Time: 4:44 PM

## 2017-10-16 NOTE — PROGRESS NOTES
H and P from admission reviewed  There have been no interval changes  Prior imaging was reviewed  On pressors but stable  Cholecystitis by imaging  Plan for perc william  Discussed with wife as well by telephone  Informed written consent was obtained from him  Questions answered       Moon Aviles MD  10/16/17  4:33 PM

## 2017-10-16 NOTE — PLAN OF CARE
CARDIOVASCULAR - ADULT     Maintains optimal cardiac output and hemodynamic stability Progressing     Absence of cardiac dysrhythmias or at baseline rhythm Progressing        DISCHARGE PLANNING     Discharge to home or other facility with appropriate resources Progressing        DISCHARGE PLANNING - CARE MANAGEMENT     Discharge to post-acute care or home with appropriate resources Progressing        GENITOURINARY - ADULT     Maintains or returns to baseline urinary function Progressing     Absence of urinary retention Progressing     Urinary catheter remains patent Progressing        Knowledge Deficit     Patient/family/caregiver demonstrates understanding of disease process, treatment plan, medications, and discharge instructions Progressing        METABOLIC, FLUID AND ELECTROLYTES - ADULT     Electrolytes maintained within normal limits Progressing     Fluid balance maintained Progressing     Glucose maintained within target range Progressing        MUSCULOSKELETAL - ADULT     Maintain or return mobility to safest level of function Progressing     Maintain proper alignment of affected body part Progressing        PAIN - ADULT     Verbalizes/displays adequate comfort level or baseline comfort level Progressing        Potential for Falls     Patient will remain free of falls Progressing        Prexisting or High Potential for Compromised Skin Integrity     Skin integrity is maintained or improved Progressing        RESPIRATORY - ADULT     Achieves optimal ventilation and oxygenation Progressing        SAFETY ADULT     Maintain or return to baseline ADL function Progressing     Maintain or return mobility status to optimal level Progressing     Patient will remain free of falls Progressing

## 2017-10-16 NOTE — PROCEDURES
Arterial Line Insertion  Date/Time: 10/15/2017 9:18 PM  Performed by: Chari Virgen by: Antione Sampson     Patient location:  Bedside  Other Assisting Provider: Yes (comment) Bharti Farah RN)    Consent:     Consent obtained:  Verbal (Procedure, including risks and benefits was discussed by Dr Margoth Gagnon with the patient in person and with the patient's spouse over the phone)    Consent given by:  Patient and spouse    Risks discussed:  Bleeding, ischemia, pain and repeat procedure  Universal protocol:     Procedure explained and questions answered to patient or proxy's satisfaction: yes      Site/side marked: yes      Immediately prior to procedure a time out was called: yes      Patient identity confirmed:  Verbally with patient  Indications:     Indications: hemodynamic monitoring, continuous blood pressure monitoring and frequent labs / infusion    Pre-procedure details:     Preparation: Patient was prepped and draped in sterile fashion    Anesthesia (see MAR for exact dosages): Anesthesia method:  Local infiltration    Local anesthetic:  Lidocaine 1% w/o epi  Procedure details:     Location / Tip of Catheter:  Radial    Laterality:  Left    Farhan's test performed: yes      Farhan's test abnormal: no      Needle gauge:  20 G    Placement technique:  Ultrasound guided    Number of attempts:  2    Successful placement: yes      Transducer: waveform confirmed    Post-procedure details:     Post-procedure:  Biopatch applied, secured with tape and sutured    CMS:  Normal    Patient tolerance of procedure:   Tolerated well, no immediate complications

## 2017-10-16 NOTE — DISCHARGE INSTRUCTIONS
Percutaneous Transhepatic Biliary Drainage   WHAT YOU NEED TO KNOW:   Percutaneous transhepatic biliary drainage (PTBD) is done to open a blocked bile duct  DISCHARGE INSTRUCTIONS:   Medicines:   · Medicines  can help decrease pain or prevent a bacterial infection  Ask your healthcare provider how to take prescription pain medicine safely  · Take your medicine as directed  Contact your healthcare provider if you think your medicine is not helping or if you have side effects  Tell him or her if you are allergic to any medicine  Keep a list of the medicines, vitamins, and herbs you take  Include the amounts, and when and why you take them  Bring the list or the pill bottles to follow-up visits  Carry your medicine list with you in case of an emergency  Follow up with your healthcare provider as directed: You may need to return so your healthcare provider can check your tube  You may also need more tests  Write down your questions so you remember to ask them during your visits  PTBD tube care:  Ask your healthcare provider how to care for your tube and the skin around it  If you have a bag attached to the PTBD tube, you will need to check for bile in the bag  Ask how much bile should be in the bag and what color it should be  You will need to make sure the PTBD tube is not blocked  Ask your healthcare provider how and when to flush the inside of the tube  Contact your healthcare provider if:   · Your skin or the whites of your eyes look more yellow than usual     · You have a fever  · You have nausea, or you are vomiting  · Your bowel movements have changed color and are very light or dark  · Your skin around your tube itches or is red, swollen, or painful  · You have questions or concerns about your condition or care  Seek care immediately or call 911 if:   · You have a drainage bag, and there is little or no fluid draining into the bag       · You are coughing or vomiting blood     · You are dizzy, or you feel too weak to stand up  · You have new trouble breathing  · Your abdomen is hard, swollen, or painful  · Your tube falls out  · Your stools look red, or you see blood in the toilet  © 2017 [address] [employer] Information is for End User's use only and may not be sold, redistributed or otherwise used for commercial purposes  All illustrations and images included in CareNotes® are the copyrighted property of [employer]  or [employer]  The above information is an  only  It is not intended as medical advice for individual conditions or treatments  Talk to your doctor, nurse or pharmacist before following any medical regimen to see if it is safe and effective for you

## 2017-10-16 NOTE — PROGRESS NOTES
Progress Note - Critical Care   Dean Weston 65 y o  male MRN: [id number]  Unit/Bed#:  Encounter: [id number]    Attending Physician: Jeffrey Silva MD  ______________________________________________________________________  Assessment and Plan:   1  Acute hypoxic respiratory failure  2  Acute on chronic systolic heart failure  3  Shock, multifactorial  4  Sepsis  5  Pneumonia  6  NSTEMI type II  7  Acute on chronic stage 4 kidney disease  8  Lactic acidosis  9  Anemia  10  Anion gap metabolic acidosis  11  Diabetes mellitus    Neuro: Frequent neuro assessments with reorientation, can begin medications for pain    CV: Close hemodynamic monitoring, transition to Levophed from neosynephrine  Will hold Primacor and Lasix infusion at present, ECHO pending for today, continue scheduled albumin for hemodynamic support with cautious resuscitation    Pulm: Continue bipap for now, when stabilizes will again attempt to transition to high flow nasal cannula     GI: Frequent abdominal exams, NPO for now, trend LFTs, obtain RUQ US    : Close intake and output, daily weights, will request urology consult for blood around meatus and continue pain associated with catheterization, trend serum creatinine, appreciate nephrology recommendations, increasing gap may be due to hyperglycemia versus worsening renal function    F/E/N: Continue scheduled albumin, replete electrolytes as needed, trend lactic acid levels    ID: Will broaden antibiotics to include vancomycin and Zosyn    Heme: Hold DVT prophylaxis, trend hemoglobin closely    Endo: Follow blood sugars closely, would start insulin infusion     Msk/Skin: Frequent turning and repositioning    Disposition: Remain ICU    Code Status: Level 1 - Full Code    Counseling / Coordination of Care  Total Critical Care time spent 45 minutes excluding procedures, teaching and family updates      ______________________________________________________________________    Chief Complaint: "My abdomen hurts"    24 Hour Events: Patient with worsening shortness of breath into the evening, given 500 mg of Diamox, 10 mg total of Zaroxylyn, and 120 mg of Lasix with an infusion at 20 mg/hr  Started on Primacor at 0 125 which was titrated to 0 25 overnight with scheduled albumin 200 mL of 25%  At approximately 1 AM, the patient became hypotensive, more tachypneic, and his heart rate fell to 60 bpm on a paced rhythm with a complaint of significant abdominal pain and distension  He underwent a CT non-con of his C/A/P which demonstrated bilateral effusions and possible cholecystitis  His antibiotics were broadened to vancomycin/Zosyn, diuretics and Primacor held, and he was transitioned from neosynephrine to Levophed   ______________________________________________________________________    Physical Exam:   Gen: Awake, alert, pleasant, cooperative  Neuro: GCS 15, moves all extremities, sensation intact  HEENT: Atraumatic, PERRL, conjugate gaze, face mask in place, trachea midline, no JVD  CV: Regular rate and rhythm  Pulm: Rales bilateral mid and lower lobes, dry, nonproductive cough  GI: Abdomen soft, distended, tender, with no rebound or guarding, bowel sounds hypoactive  : Calabrese in place with yellow urine, blood around meatus and soaking sheets  MSK: Atraumatic, no clubbing/cyanosis/edema  Skin: Warm, dry, intact  ______________________________________________________________________  Vitals:    10/15/17 2200 10/15/17 2300 10/15/17 2335 10/16/17 0028   BP: 109/55 108/51  (!) 73/39   Pulse: 90 91  62   Resp: (!) 30 (!) 26  (!) 33   Temp: (!) 101 8 °F (38 8 °C) (!) 101 5 °F (38 6 °C)  (!) 101 3 °F (38 5 °C)   TempSrc:  Oral     SpO2: 98% 99% 96% 92%   Weight:       Height:           Temperature:   Temp (24hrs), Av 5 °F (38 1 °C), Min:97 9 °F (36 6 °C), Max:102 °F (38 9 °C)    Current Temperature: (!) 101 3 °F (38 5 °C)  Weights:   IBW: 61 5 kg    Body mass index is 28 14 kg/m²    Weight (last 2 days) Date/Time   Weight    10/15/17 0419  76 7 (169 09)    10/14/17 2024  77 4 (170 64)    10/14/17 1049  70 8 (156 09)    10/14/17 0900  70 8 (156 09)    10/14/17 0734  80 8 (178 13)            Hemodynamic Monitoring:  N/A     Non-Invasive/Invasive Ventilation Settings:  Respiratory    Lab Data (Last 4 hours)    None         O2/Vent Data (Last 4 hours)    None              No results found for: PHART, YXK6IYR, PO2ART, YXX5JAX, O8AXKMGN, BEART, SOURCE  SpO2: SpO2: 92 %, SpO2 Activity: SpO2 Activity: At Rest, SpO2 Device: O2 Device: Nasal cannula  Intake and Outputs:  I/O       10/14 0701 - 10/15 0700 10/15 0701 - 10/16 0700    P  O  0 540    I V  (mL/kg) 9 2 (0 1) 290 5 (3 8)    IV Piggyback  450    Total Intake(mL/kg) 9 2 (0 1) 1280 5 (16 7)    Urine (mL/kg/hr) 805 1600 (0 9)    Stool 0 0 (0)    Total Output 805 1600    Net -795 8 -319 5          Unmeasured Stool Occurrence 0 x 0 x          UOP: 65/hour     Nutrition:        Diet Orders            Start     Ordered    10/15/17 1739  Diet NPO  Diet effective now     Question Answer Comment   Diet Type NPO    RD to adjust diet per protocol?  Yes        10/15/17 1739        Labs:     Results from last 7 days  Lab Units 10/16/17  0106 10/15/17  0436 10/14/17  1932 10/14/17  1206 10/14/17  0753   WBC Thousand/uL 9 71 10 74*  --   --  11 18*   HEMOGLOBIN g/dL 10 3* 12 8  --   --  13 4   I STAT HEMOGLOBIN g/dl  --   --  13 3  --   --    HEMATOCRIT % 31 9* 38 2  --   --  41 3   PLATELETS Thousands/uL 116* 143*  --  145* 155   NEUTROS PCT %  --  74  --   --  79*   MONOS PCT %  --  12  --   --  9       Results from last 7 days  Lab Units 10/16/17  0006 10/15/17  1949 10/15/17  1557  10/14/17  0753   SODIUM mmol/L 140 139 138  < > 137   POTASSIUM mmol/L 3 9 3 6 4 0  < > 5 8*   CHLORIDE mmol/L 101 100 100  < > 103   CO2 mmol/L 23 28 28  < > 25   BUN mg/dL 85* 82* 79*  < > 60*   CREATININE mg/dL 3 82* 3 56* 3 66*  < > 3 18*   CALCIUM mg/dL 8 3 8 7 8 6  < > 8 9   TOTAL PROTEIN g/dL --   --   --   --  6 9   BILIRUBIN TOTAL mg/dL  --   --   --   --  0 70   ALK PHOS U/L  --   --   --   --  162*   ALT U/L  --   --   --   --  53   AST U/L  --   --   --   --  29   GLUCOSE RANDOM mg/dL 219* 197* 262*  < > 256*   GLUCOSE, ISTAT   --   --   --   < >  --    < > = values in this interval not displayed  Results from last 7 days  Lab Units 10/16/17  0006 10/15/17  1949 10/15/17  1557   MAGNESIUM mg/dL 1 8 2 1 2 1     Lab Results   Component Value Date    PHOS 3 0 09/18/2017    PHOS 3 9 07/25/2017    PHOS 3 2 05/26/2017    PHOS 3 0 11/24/2015            0  Lab Value Date/Time   TROPONINI 1 18 (H) 10/15/2017 0831   TROPONINI 1 29 (H) 10/15/2017 0436   TROPONINI 1 09 (H) 10/15/2017 0022   TROPONINI 0 95 (H) 10/14/2017 2102   TROPONINI 0 93 (H) 10/14/2017 1758   TROPONINI 0 90 (H) 10/14/2017 1515   TROPONINI 0 77 (H) 10/14/2017 1206   TROPONINI 0 66 (H) 10/14/2017 0753   TROPONINI 0 49 (H) 04/14/2017 0412   TROPONINI 0 52 (H) 04/13/2017 2353   TROPONINI 0 67 (H) 04/13/2017 2248       Results from last 7 days  Lab Units 10/16/17  0106   LACTIC ACID mmol/L 2 4*     ABG:No results found for: PHART, RRB6SXG, PO2ART, UYR6KFL, D5AQWZPQ, BEART, SOURCE    Imaging:   10/16 CT C/A/P- Central interstitial prominence wand consolidation with peripheral septal thickening compatible with CHF, hyperdense gallbladder contents may reflect stones or sludge, the gallbladder is moderately distended with right upper quadrant stranding I have personally reviewed pertinent reports     and I have personally reviewed pertinent films in PACS    EKG: Review of telemetry demonstrates a paced rhythm    Micro:  No results found for: BLOODCX, URINECX, WOUNDCULT, SPUTUMCULTUR    Allergies: No Known Allergies    Medications:   Scheduled Meds:  albumin human 50 g Intravenous Q6H   albumin human 12 5 g Intravenous Once   azithromycin 500 mg Intravenous Q24H   cefTRIAXone 1,000 mg Intravenous Q24H   erythromycin 0 5 inch Both Eyes HS   heparin (porcine) 5,000 Units Subcutaneous Q8H [facility]   insulin lispro 1-6 Units Subcutaneous TID AC   insulin lispro 5 Units Subcutaneous TID With Meals   ipratropium 0 5 mg Nebulization TID   levalbuterol 1 25 mg Nebulization TID   magnesium sulfate 2 g Intravenous Once   metolazone 5 mg Oral Daily   potassium chloride 20 mEq Intravenous Once     Continuous Infusions:  furosemide 20 mg/hr Last Rate: 20 mg/hr (10/15/17 1816)   milrinone (PRIMACOR) infusion 0 25 mcg/kg/min Last Rate: Stopped (10/16/17 0036)   phenylephine  mcg/min Last Rate: 100 mcg/min (10/16/17 0039)     PRN Meds:    acetaminophen 650 mg Q6H PRN   polyvinyl alcohol 2 drop Q3H PRN       VTE Pharmacologic Prophylaxis: Reason for no pharmacologic prophylaxis acute anemia  VTE Mechanical Prophylaxis: sequential compression device    Invasive lines and devices: Invasive Devices     Peripheral Intravenous Line            Peripheral IV 10/14/17 Right;Dorsal (posterior) Forearm 1 day    Peripheral IV 10/15/17 Left Antecubital less than 1 day    Peripheral IV 10/15/17 Right Wrist less than 1 day          Arterial Line            Arterial Line 10/15/17 Radial less than 1 day          Drain            Urethral Catheter Temperature probe 16 Fr  1 day                     Portions of the record may have been created with voice recognition software  Occasional wrong word or "sound a like" substitutions may have occurred due to the inherent limitations of voice recognition software  Read the chart carefully and recognize, using context, where substitutions have occurred      JOSE MIGUEL Hair

## 2017-10-16 NOTE — CONSULTS
Patient is being currently followed by nephrology service  Initial consultation was done on 10/14/2017      Elias Grier MD  Nephrology  10/16/2017

## 2017-10-16 NOTE — PROGRESS NOTES
Progress Note - Cardiology     Robert Player 65 y o  male MRN: [id number]  Unit/Bed#:  Encounter: [id number]      Subjective:   Patient developed respiratory distress last night  He was given diamox, zaroxolyn + lasix gtt at 20 mg /hour with little improvement  He then became hypotensive and required levophed  Nephrology has discussed considerations for dialysis  Objective:   Vitals:  Vitals:    10/16/17 0933   BP:    Pulse:    Resp:    Temp:    SpO2: 100%       Body mass index is 28 14 kg/m²  Systolic (48TZN), XIB:742 , Min:73 , UHI:450     Diastolic (57QQW), WRB:92, Min:39, Max:61      Intake/Output Summary (Last 24 hours) at 10/16/17 1059  Last data filed at 10/16/17 0601   Gross per 24 hour   Intake          2335 92 ml   Output             1400 ml   Net           935 92 ml     Weight (last 2 days)     Date/Time   Weight    10/16/17 0600  76 7 (169 09)    10/15/17 0419  76 7 (169 09)    10/14/17 2024  77 4 (170 64)    10/14/17 1049  70 8 (156 09)    10/14/17 0900  70 8 (156 09)    10/14/17 0734  80 8 (178 13)              PHYSICAL EXAM:  General: Patient appears short of breath, but is able to complete sentences  Head: Normocephalic  Atraumatic  HEENT: Both pupils normal sized, round and reactive to light  Nonicteric  Neck: JVP not raised  Trachea central   Respiratory: faint rales bilateral bases   Cardiovascular: RRR  S1 and S2 normal  No murmur, rub or gallop  GI: Abdomen soft, nontender  No guarding or rigidity  Liver and spleen not palpable  Bowel sounds present  Neurologic: Patient is awake, alert, responding to command   Moving all extremities  Integumentary:  No skin rash  Extremities: No clubbing, cyanosis or edema    LABORATORY RESULTS:    Results from last 7 days  Lab Units 10/15/17  0831 10/15/17  0436 10/15/17  0022   TROPONIN I ng/mL 1 18* 1 29* 1 09*     CBC with diff:   Results from last 7 days  Lab Units 10/16/17  0437 10/16/17  0106 10/15/17  0436  10/14/17  0753   WBC Thousand/uL 9  05 9 71 10 74*  --  11 18*   HEMOGLOBIN g/dL 10 8* 10 3* 12 8  --  13 4   I STAT HEMOGLOBIN   --   --   --   < >  --    HEMATOCRIT % 33 8* 31 9* 38 2  --  41 3   MCV fL 101* 100* 98  --  100*   PLATELETS Thousands/uL 122* 116* 143*  < > 155   MCH pg 32 1 32 2 32 8  --  32 5   MCHC g/dL 32 0 32 3 33 5  --  32 4   RDW % 12 9 12 7 12 7  --  12 7   MPV fL 11 3 11 0 10 8  < > 11 3   NRBC AUTO /100 WBCs 0  --  0  --  0   < > = values in this interval not displayed  CMP:  Results from last 7 days  Lab Units 10/16/17  0439 10/16/17  0437 10/16/17  0006 10/15/17  1949  10/14/17  0753   SODIUM mmol/L 139  --  140 139  < > 137   POTASSIUM mmol/L 4 7  --  3 9 3 6  < > 5 8*   CHLORIDE mmol/L 100  --  101 100  < > 103   CO2 mmol/L 21  --  23 28  < > 25   ANION GAP mmol/L 18*  --  16* 11  < > 9   BUN mg/dL 86*  --  85* 82*  < > 60*   CREATININE mg/dL 4 05*  --  3 82* 3 56*  < > 3 18*   GLUCOSE RANDOM mg/dL 261*  --  219* 197*  < > 256*   GLUCOSE, ISTAT   --   --   --   --   < >  --    CALCIUM mg/dL 8 5  --  8 3 8 7  < > 8 9   AST U/L  --  89*  --   --   --  29   ALT U/L  --  85*  --   --   --  53   ALK PHOS U/L  --  145*  --   --   --  162*   TOTAL PROTEIN g/dL  --  6 6  --   --   --  6 9   ALBUMIN g/dL  --  4 1  --   --   --  3 2*   BILIRUBIN TOTAL mg/dL  --  3 10*  --   --   --  0 70   EGFR ml/min/1 73sq m 13  --  14 15  < > 18   < > = values in this interval not displayed      BMP:  Results from last 7 days  Lab Units 10/16/17  0439 10/16/17  0006 10/15/17  1949   SODIUM mmol/L 139 140 139   POTASSIUM mmol/L 4 7 3 9 3 6   CHLORIDE mmol/L 100 101 100   CO2 mmol/L 21 23 28   BUN mg/dL 86* 85* 82*   CREATININE mg/dL 4 05* 3 82* 3 56*   GLUCOSE RANDOM mg/dL 261* 219* 197*   CALCIUM mg/dL 8 5 8 3 8 7         Results from last 7 days  Lab Units 10/14/17  0753   NT-PRO BNP pg/mL 7,446*          Results from last 7 days  Lab Units 10/16/17  0439 10/16/17  0006 10/15/17  1949 10/15/17  1557 10/15/17  0436 10/15/17  0025 10/14/17  0753   MAGNESIUM mg/dL 2 6 1 8 2 1 2 1 2 0 2 2 2 2               Results from last 7 days  Lab Units 10/14/17  0753   TSH 3RD GENERATON uIU/mL 2 496             Lipid Profile:   Lab Results   Component Value Date    CHOL 99 2017    CHOL 96 2017    CHOL 236 (H) 2016     Lab Results   Component Value Date    HDL 37 (L) 2017    HDL 45 2017    HDL 33 (L) 2016     Lab Results   Component Value Date    LDLCALC 39 2017    LDLCALC 34 2017    LDLCALC 139 (H) 2016     Lab Results   Component Value Date    TRIG 115 2017    TRIG 85 2017    TRIG 319 (H) 2016       Cardiac testing:   Results for orders placed during the hospital encounter of 17   Echo complete with contrast if indicated    Narrative [address] [place] [id number]  [phone]    Transthoracic Echocardiogram  2D, M-mode, Doppler, and Color Doppler    Study date:  2017    Patient: Jose Juan Villafana  MR number: [id number]  Account number: [de-identified]  : 1940  Age: 67 years  Gender: Male  Status: Inpatient  Location: Emergency room  Height: 65 in  Weight: 182 lb  BP: 138/ 65 mmHg    Indications: heart failure, Assess left ventricular function  Diagnoses: I50 9 - Heart failure, unspecified    Sonographer:   Cal[facility]  Interpreting Physician:  Babar Cullen MD  Primary Physician:  Tano Geiger PA-C  Referring Physician:  Babar Cullen MD  Group:  [facility]    SUMMARY    LEFT VENTRICLE:  Systolic function was severely reduced  Ejection fraction was estimated to be 35 %  There was akinesis of the basal inferolateral wall(s)  There was akinesis of the mid inferolateral wall(s)  There was akinesis of the basal inferior wall(s)  There was severe hypokinesis of the mid inferior wall(s)  There was mild concentric hypertrophy    Features were consistent with a pseudonormal left ventricular filling pattern, with concomitant abnormal relaxation and increased filling pressure (grade 2 diastolic dysfunction)  LEFT ATRIUM:  The atrium was mildly dilated  MITRAL VALVE:  There was moderate regurgitation  TRICUSPID VALVE:  There was mild regurgitation  PULMONIC VALVE:  There was mild regurgitation  HISTORY: PRIOR HISTORY: Congestive heart failure  Risk factors: hypertension and diabetes  PRIOR PROCEDURES: Pacemaker implantation  PROCEDURE: The procedure was performed in the emergency room  This was a routine study  The transthoracic approach was used  The study included complete 2D imaging, M-mode, complete spectral Doppler, and color Doppler  The heart rate was  68 bpm, at the start of the study  Images were obtained from the parasternal, apical, subcostal, and suprasternal notch acoustic windows  Echocardiographic views were limited due to poor acoustic window availability, decreased penetration,  and lung interference  This was a technically difficult study  LEFT VENTRICLE: Systolic function was severely reduced  Ejection fraction was estimated to be 35 %  There was akinesis of the basal inferolateral wall(s)  There was akinesis of the mid inferolateral wall(s)  There was akinesis of the basal  inferior wall(s)  There was severe hypokinesis of the mid inferior wall(s)  There was mild concentric hypertrophy  DOPPLER: Features were consistent with a pseudonormal left ventricular filling pattern, with concomitant abnormal relaxation  and increased filling pressure (grade 2 diastolic dysfunction)  RIGHT VENTRICLE: The size was normal  Systolic function was normal  Wall thickness was normal     LEFT ATRIUM: The atrium was mildly dilated  RIGHT ATRIUM: Size was normal  A pacing wire was present  MITRAL VALVE: DOPPLER: There was moderate regurgitation  AORTIC VALVE: Leaflets exhibited mild calcification      TRICUSPID VALVE: The valve structure was normal  There was cm2    [address] [facility]redited [facility]    Prepared and electronically signed by    Samuel Valencia MD  Signed 14-Apr-2017 17:50:57             Meds/Allergies   all current active meds have been reviewed  Prescriptions Prior to Admission   Medication    oxyCODONE-acetaminophen (PERCOCET) 5-325 mg per tablet    allopurinol (ZYLOPRIM) 100 mg tablet    amiodarone 200 mg tablet    aspirin (ECOTRIN LOW STRENGTH) 81 mg EC tablet    carvedilol (COREG) 25 mg tablet    clopidogrel (PLAVIX) 75 mg tablet    furosemide (LASIX) 80 mg tablet    insulin glargine (LANTUS) 100 units/mL subcutaneous injection    insulin lispro (HumaLOG) 100 units/mL injection    losartan (COZAAR) 25 mg tablet    pantoprazole (PROTONIX) 40 mg tablet    rasagiline (AZILECT) 1 MG    rosuvastatin (CRESTOR) 20 MG tablet    tamsulosin (FLOMAX) 0 4 mg         norepinephrine 1-30 mcg/min Last Rate: 5 mcg/min (10/16/17 0300)   nx stage k 2/ca 3 5,000 mL          Assessment and Plan:  1   Systolic congestive heart failure/cardiomyopathy status post ICD  -EF 35% per echo 04/2017  -worsening MADELIN with CKD-- nephrology planning dialysis as patient is not responding to diuretics   -continue daily weights, salt restriction, I's and O's  -continue carvedilol   No ACE/Arb due to CKD     2   Elevated troponin   0 66/0 77/0 90/0 93/0 95/1 09/1 29/1 18  -likely represents post elevated troponin due to CKD + NSTEMI type 2 secondary to hypoxia/respiratory distress   -repeat echo pending      3   History of CAD status post CABG- no recent anginal symptoms   Continue aspirin, statin, beta-blocker      ** Please Note: Dragon 360 Dictation voice to text software may have been used in the creation of this document   **

## 2017-10-16 NOTE — CONSULTS
Consultation - UROLOGY  Jeyson Jensen 65 y o  male MRN: [id number]  Unit/Bed#:  Encounter: [id number]      Assessment/Plan    Hematuria - patient had Calabrese placed for accurate I/O assessment in ICU  Blood at the meatus was noted, NOT hematuria  This was likely from some trauma during Calabrese placement Patient also had discomfort at Calabrese placement  The patient no longer has discomfort  Blood at meatus has resolved  Patient has acute kidney injury on chronic kidney disease and is making little urine  Nephrology is following and he is being set up for dialysis as the patient is unresponsive to diuretic  Acute cholecystitis - surgery planing on percutaneous william tube to be placed by IR  Acute on chronic CHF, with elevated troponin  HTN  MADELIN on CKD - nephrology planning dialysis due to patient being unresponsive to diuretics  Parkinson's disease  ICD in place  Diabetes mellitus type 2  HLD  CAD with history of CABG    Plan:  - continue to monitor urine  - patient may have bacitracin applied to meatus  - continue to monitor I/Os  - continue with medical management per primary team  - will discuss with the urologist and make further recommendations if needed  Physician Requesting Consult: Christal Sanford MD    Chief Complaint:  There was some blood at the tip of my penis after they placed a Calabrese    Shaina Sanders is a 65y o  year old male with a past medical history of CKD stage 4, systolic CHF, CAD S/P CABG, ischemic cardiomyopathy s/P ICD placement, HTN, HLD, DM type 2, and Parkinson's disease consulted for  hematuria and Calabrese placement discomfort  Patient seen with wife in attendance   A Calabrese catheter was placed for accurate assessment of I/Os in the ICU  Patient states that after Calabrese placement he had noted some blood at the penile meatus  He also had some discomfort  At this time discomfort has resolved and there has been no further bleeding    Of note, there is no hematuria and no hematuria was noted, only some bleeding noted from the meatus  Urine is dark yellow and clear in color  There has been little urine output as patient is not making much urine and needs dialysis  He denies current Calabrese discomfort  He denies fever  Patient is visibly uncomfortable and short of breath with nasal cannula in place  Patient was admitted on 10/14/2017 with progressive weakness and shortness of breath after medication changed and found to be in an acute CHF exacerbation  He was also found to have MADELIN on CKD  Acute Care surgery, Nephrology, and Cardiology are also following      ROS negative except for as noted in HPI    Historical Information   Past Medical History:   Diagnosis Date    [facility]     Gout     History of MI (myocardial infarction)     x3    Hyperlipidemia     Hypertension     Parkinson disease ([facility] )     Renal disorder      Past Surgical History:   Procedure Laterality Date    APPENDECTOMY      CARDIAC PACEMAKER PLACEMENT      CORONARY ARTERY BYPASS GRAFT      TONSILLECTOMY       Social History   History   Alcohol Use No     History   Drug Use No     History   Smoking Status    Never Smoker   Smokeless Tobacco    Never Used     Family History: no pertinent family history  No Known Allergies  Meds/Allergies     current meds:   Current Facility-Administered Medications   Medication Dose Route Frequency    acetaminophen (TYLENOL) rectal suppository 650 mg  650 mg Rectal Q6H PRN    albumin human (FLEXBUMIN) 25 % injection 50 g  50 g Intravenous Q6H    azithromycin (ZITHROMAX) 500 mg in sodium chloride 0 9 % 250 mL IVPB  500 mg Intravenous Q24H    erythromycin (ILOTYCIN) 0 5 % ophthalmic ointment 0 5 inch  0 5 inch Both Eyes HS    insulin lispro (HumaLOG) 100 units/mL subcutaneous injection 4-20 Units  4-20 Units Subcutaneous Q6H [address]    insulin lispro (HumaLOG) 100 units/mL subcutaneous injection 5 Units  5 Units Subcutaneous TID With Meals    ipratropium (ATROVENT) 0 02 % inhalation solution 0 5 mg  0 5 mg Nebulization TID    levalbuterol (XOPENEX) inhalation solution 1 25 mg  1 25 mg Nebulization TID    norepinephrine (LEVOPHED) 4 mg (STANDARD CONCENTRATION) IV in sodium chloride 0 9% 250 mL  1-30 mcg/min Intravenous Titrated    nx stage k 2/ca 3 dialysis solution (RFP-400) 5,000 mL  5,000 mL Dialysis Continuous    piperacillin-tazobactam (ZOSYN) 2 25 g in sodium chloride 0 9 % 50 mL IVPB  2 25 g Intravenous Q6H    polyvinyl alcohol (LIQUIFILM TEARS) 1 4 % ophthalmic solution 2 drop  2 drop Both Eyes Q3H PRN    vancomycin (VANCOCIN) 1,250 mg in sodium chloride 0 9 % 250 mL IVPB  15 mg/kg Intravenous Q24H    and PTA meds:   Prior to Admission Medications   Prescriptions Last Dose Informant Patient Reported?  Taking?   allopurinol (ZYLOPRIM) 100 mg tablet   Yes No   Sig: Take 100 mg by mouth daily     amiodarone 200 mg tablet   Yes No   Sig: Take 200 mg by mouth daily     aspirin (ECOTRIN LOW STRENGTH) 81 mg EC tablet   Yes No   Sig: Take by mouth every other day   carvedilol (COREG) 25 mg tablet   Yes No   Sig: Take 25 mg by mouth daily     clopidogrel (PLAVIX) 75 mg tablet   Yes No   Sig: Take by mouth every other day     furosemide (LASIX) 80 mg tablet   No No   Sig: Take 1 tablet by mouth 2 (two) times a day for 30 days   Patient taking differently: Take 80 mg by mouth daily     insulin glargine (LANTUS) 100 units/mL subcutaneous injection   No No   Sig: Inject 10 Units under the skin daily at bedtime for 30 days   insulin lispro (HumaLOG) 100 units/mL injection   No No   Sig: Inject 5 Units under the skin 3 (three) times a day with meals for 30 days   losartan (COZAAR) 25 mg tablet   Yes No   Sig: Take by mouth daily     oxyCODONE-acetaminophen (PERCOCET) 5-325 mg per tablet   Yes Yes   Sig: Take 1 tablet by mouth every 6 (six) hours as needed for moderate pain   pantoprazole (PROTONIX) 40 mg tablet   Yes No   Sig: Take by mouth daily rasagiline (AZILECT) 1 MG   Yes No   Sig: Take by mouth daily     rosuvastatin (CRESTOR) 20 MG tablet   Yes No   Sig: Take 10 mg by mouth every other day     tamsulosin (FLOMAX) 0 4 mg   Yes No   Sig: Take by mouth      Facility-Administered Medications: None         Objective   Vitals: Blood pressure 117/61, pulse 84, temperature 98 8 °F (37 1 °C), resp  rate (!) 32, height 5' 5" (1 651 m), weight 76 7 kg (169 lb 1 5 oz), SpO2 100 %  ,Body mass index is 28 14 kg/m²  Intake/Output Summary (Last 24 hours) at 10/16/17 1247  Last data filed at 10/16/17 0601   Gross per 24 hour   Intake          2323 92 ml   Output             1325 ml   Net           998 92 ml     Invasive Devices     Peripheral Intravenous Line            Peripheral IV 10/15/17 Left Antecubital less than 1 day    Peripheral IV 10/15/17 Right Wrist less than 1 day          Arterial Line            Arterial Line 10/15/17 Radial less than 1 day          Drain            Urethral Catheter Temperature probe 16 Fr  1 day                Physical Exam:    General appearance:  Patient seen at bedside with wife present, the patient oriented to person place and time  He is visibly short of breath but able to complete sentences  Mild distress  HEENT: PERRLA,EOMI, Sclera clear,anicterus  Neck: No carotid bruits, no thyromegaly, trachea midline  Lungs:  Diffuse rales throughout lung fields with decreased air movement    Heart: RRR, S1, S2 normal, no murmur, click, rub or gallop  Abdomen:  Distended, active bowel sounds, right upper quadrant tenderness, left mid abdomen tenderness  Extremities: FROM, no joint deformities, pedal edema  Skin: No rashes or lesions  Neurologic: CN II-XII intact, affect appropriate        Lab Results:     Creatinine:   Lab Results   Component Value Date    CREATININE 4 45 (H) 10/16/2017    CBC with diff:   Lab Results   Component Value Date    WBC 9 05 10/16/2017    HGB 10 4 (L) 10/16/2017    HCT 33 8 (L) 10/16/2017     (H) 10/16/2017     (L) 10/16/2017    MCH 32 1 10/16/2017    MCHC 32 0 10/16/2017    RDW 12 9 10/16/2017    MPV 11 3 10/16/2017    NRBC 0 10/16/2017   , BMP/CMP:   Lab Results   Component Value Date     10/16/2017    K 4 2 10/16/2017     10/16/2017    CO2 19 (L) 10/16/2017    ANIONGAP 19 (H) 10/16/2017    BUN 99 (H) 10/16/2017    CREATININE 4 45 (H) 10/16/2017    GLUCOSE 301 (H) 10/16/2017    CALCIUM 8 4 10/16/2017    AST 89 (H) 10/16/2017    ALT 85 (H) 10/16/2017    ALKPHOS 145 (H) 10/16/2017    PROT 6 6 10/16/2017    ALBUMIN 4 1 10/16/2017    BILITOT 3 10 (H) 10/16/2017    EGFR 12 10/16/2017     Imaging Studies:   Ct Chest Abdomen Pelvis Wo Contrast  Result Date: 10/16/2017  Impression: 1  Bibasilar pleural effusions with atelectasis and asymmetric pulmonary opacities, right greater than left  Findings likely reflect CHF with asymmetric pulmonary edema  Infiltrates less likely though not entirely excluded  2  Probable vicarious excretion of the gallbladder  Findings are consistent with the preliminary report from [facility] which was provided shortly after completion of the exam              Workstation performed: [id number]     Xr Chest Portable  Result Date: 10/16/2017  Impression: Interstitial prominence in the lungs suggest congestion, mildly decreased No worsening seen Workstation performed: [id number]     Xr Chest 2 Views  Result Date: 10/15/2017  Impression: Mild interstitial edema suggested which may be chronic and/or recurrent  No confluent infiltrates  Workstation performed: [id number]     Xr Chest Portable Icu  Result Date: 10/15/2017  Impression: Cardiomegaly with persistent central vascular prominence  Workstation performed: Marilee Merlin Right Upper Quadrant  Result Date: 10/16/2017  Impression: Acute cholecystitis  Sludge/debris is present in the gallbladder but no definite obstructing stone is seen   ##imslh##imslh Workstation performed: [id number]       Beny Richey Cherylene Burton, PA-C   10/16/2017

## 2017-10-16 NOTE — PROCEDURES
Central Line Insertion  Date/Time: 10/16/2017 5:45 PM  Performed by: Scarlet Avery  Authorized by: Scarlet Avery     Patient location:  Bedside  Other Assisting Provider: Yes (comment) (Dr Oryl Hopper)    Consent:     Consent obtained:  Written    Consent given by:  Patient    Risks discussed:  Arterial puncture, bleeding, incorrect placement, infection, nerve damage and pneumothorax    Alternatives discussed:  No treatment, delayed treatment, alternative treatment and observation  Universal protocol:     Procedure explained and questions answered to patient or proxy's satisfaction: yes      Imaging studies available: yes      Required blood products, implants, devices, and special equipment available: yes      Site/side marked: yes      Immediately prior to procedure, a time out was called: yes      Patient identity confirmed:  Verbally with patient  Pre-procedure details:     Hand hygiene: Hand hygiene performed prior to insertion      Sterile barrier technique: All elements of maximal sterile technique followed      Skin preparation:  2% chlorhexidine    Skin preparation agent: Skin preparation agent completely dried prior to procedure    Indications:     Central line indications: dialysis    Anesthesia (see MAR for exact dosages): Anesthesia method:  Local infiltration    Local anesthetic:  Lidocaine 1% w/o epi  Procedure details:     Location:  Right internal jugular    Vessel type: vein      Laterality:  Right    Approach: percutaneous technique used      Patient position:  Trendelenburg    Catheter type:  Triple lumen 16cm    Catheter size:  12 Fr    Landmarks identified: yes      Ultrasound guidance: yes      Sterile ultrasound techniques: Sterile gel and sterile probe covers were used      Number of attempts:  2    Successful placement: no    Comments:      R IJ vesssel accessed and wire easily passed and visualized in vessel with ultrasound  Unable to dilate, met significant amount of resistance    Procedure stopped at that time and manual pressure held on neck  No immediate complications  Will get CXR

## 2017-10-16 NOTE — PROGRESS NOTES
NEPHROLOGY PROGRESS NOTE    Patient: Faith Silva               Sex: male          DOA: 10/14/2017  7:32 AM   YOB: 1940        Age:  65 y o         LOS:  LOS: 2 days     REASON FOR THE CONSULTATION:  Further management of MADELIN    HPI     This is a 39-year-old male with a past medical history of CKD stage 4, systolic heart failure, coronary artery disease s/p CABG, hypertension, diabetes type 2, admitted for further management of shortness of breath     SUBJECTIVE     - overnight his breathing has worsened and also become hypotensive  Currently patient is on Levophed which is running at 5 microgram/minute  Patient has also received albumin bolus which failed to improved his blood pressure  According to patient his breathing is slightly worse compared to yesterday morning  Initially patient refused everything but now he is agreeing for central line placement along with other necessary thinks    - Reviewed last 24 hrs events     CURRENT MEDICATIONS       Current Facility-Administered Medications:     acetaminophen (TYLENOL) rectal suppository 650 mg, 650 mg, Rectal, Q6H PRN, Erin Reason, CRNP, Stopped at 10/16/17 0105    albumin human (FLEXBUMIN) 25 % injection 50 g, 50 g, Intravenous, Q6H, Erin Reason, CRNP, Stopped at 10/16/17 0603    azithromycin (ZITHROMAX) 500 mg in sodium chloride 0 9 % 250 mL IVPB, 500 mg, Intravenous, Q24H, Andrea Caal PA-C    erythromycin (ILOTYCIN) 0 5 % ophthalmic ointment 0 5 inch, 0 5 inch, Both Eyes, HS, Erin Reason, CRNP, 0 5 inch at 10/15/17 2145    insulin lispro (HumaLOG) 100 units/mL subcutaneous injection 4-20 Units, 4-20 Units, Subcutaneous, Q6H [address] **AND** Fingerstick Glucose (POCT), , , Q6H, Andrea Caal PA-C    insulin lispro (HumaLOG) 100 units/mL subcutaneous injection 5 Units, 5 Units, Subcutaneous, TID With Meals, Virginia Cruz MD, 5 Units at 10/15/17 1721    ipratropium (ATROVENT) 0 02 % inhalation solution 0 5 mg, 0 5 mg, Nebulization, TID, Dorie Stacy MD, 0 5 mg at 10/16/17 4470    levalbuterol [facility]) inhalation solution 1 25 mg, 1 25 mg, Nebulization, TID, Dorie Stacy MD, 1 25 mg at 10/16/17 0927    norepinephrine (LEVOPHED) 4 mg (STANDARD CONCENTRATION) IV in sodium chloride 0 9% 250 mL, 1-30 mcg/min, Intravenous, Titrated, [place] Crystal, CRNP, Last Rate: 18 8 mL/hr at 10/16/17 0300, 5 mcg/min at 10/16/17 0300    nx stage k 2/ca 3 dialysis solution (RFP-400) 5,000 mL, 5,000 mL, Dialysis, Continuous, Nette Nolen MD    piperacillin-tazobactam (ZOSYN) 2 25 g in sodium chloride 0 9 % 50 mL IVPB, 2 25 g, Intravenous, Q6H, MAHOGANY DotsonNP, Last Rate: 100 mL/hr at 10/16/17 1036, 2 25 g at 10/16/17 1036    polyvinyl alcohol (LIQUIFILM TEARS) 1 4 % ophthalmic solution 2 drop, 2 drop, Both Eyes, Q3H PRN, [place] Crystal, CRNP, 2 drop at 10/15/17 1917    vancomycin (VANCOCIN) 1,250 mg in sodium chloride 0 9 % 250 mL IVPB, 15 mg/kg, Intravenous, Q24H, Moiz Crystal, CRNP, Stopped at 10/16/17 0530    OBJECTIVE     Current Weight: Weight - Scale: 76 7 kg (169 lb 1 5 oz)  Vitals:    10/16/17 0933   BP:    Pulse:    Resp:    Temp:    SpO2: 100%       Intake/Output Summary (Last 24 hours) at 10/16/17 1049  Last data filed at 10/16/17 0601   Gross per 24 hour   Intake          2335 92 ml   Output             1400 ml   Net           935 92 ml       PHYSICAL EXAMINATION     GEN:  Awake, in mild distress due to shortness of breath  RS:  Bilateral equal air entry, no wheezing  CVS:  S1-S2 heard  Abdomen:  Soft, nontender, positive bowel sounds  Extremities:  Trace bilateral pedal edema, skin is warm on touch  CNS:  Awake and follows commands  HEENT:  Pink conjunctiva, no JVD, head is atraumatic  Psychiatric:  Not suicidal  Musculoskeletal:  No bony deformity in lower extremities  Lymph Node:  No palpable cervical lymphadenopathy    LAB RESULTS       Results from last 7 days  Lab Units 10/16/17  0439 10/16/17  0437 10/16/17  0106 10/16/17  0006 10/15/17  1949 10/15/17  1557 10/15/17  0436 10/15/17  0025 10/15/17  0022 10/14/17  1932 10/14/17  1206 10/14/17  0753   WBC Thousand/uL  --  9 05 9 71  --   --   --  10 74*  --   --   --   --  11 18*   HEMOGLOBIN g/dL  --  10 8* 10 3*  --   --   --  12 8  --   --   --   --  13 4   I STAT HEMOGLOBIN g/dl  --   --   --   --   --   --   --   --   --  13 3  --   --    HEMATOCRIT %  --  33 8* 31 9*  --   --   --  38 2  --   --   --   --  41 3   PLATELETS Thousands/uL  --  122* 116*  --   --   --  143*  --   --   --  145* 155   SODIUM mmol/L 139  --   --  140 139 138 142  --  141  --   --  137   POTASSIUM mmol/L 4 7  --   --  3 9 3 6 4 0 4 0  --  4 4  --   --  5 8*   CHLORIDE mmol/L 100  --   --  101 100 100 105  --  105  --   --  103   CO2 mmol/L 21  --   --  23 28 28 26  --  26  --   --  25   BUN mg/dL 86*  --   --  85* 82* 79* 73*  --  71*  --   --  60*   CREATININE mg/dL 4 05*  --   --  3 82* 3 56* 3 66* 3 59*  --  3 58*  --   --  3 18*   EGFR ml/min/1 73sq m 13  --   --  14 15 15 15  --  15  --   --  18   CALCIUM mg/dL 8 5  --   --  8 3 8 7 8 6 8 5  --  8 7  --   --  8 9   MAGNESIUM mg/dL 2 6  --   --  1 8 2 1 2 1 2 0 2 2  --   --   --  2 2   ALBUMIN g/dL  --  4 1  --   --   --   --   --   --   --   --   --  3 2*   TOTAL PROTEIN g/dL  --  6 6  --   --   --   --   --   --   --   --   --  6 9   GLUCOSE RANDOM mg/dL 261*  --   --  219* 197* 262* 149*  --  141*  --   --  256*   GLUCOSE, ISTAT mg/dl  --   --   --   --   --   --   --   --   --  203*  --   --        I have personally reviewed the old medical records and patient's previously known baseline creatinine level is ~ 2 2-2 6    RADIOLOGY RESULTS      Results for orders placed during the hospital encounter of 10/14/17   XR chest portable    Narrative CHEST     INDICATION:  CHF exacerbation, shortness of breath    COMPARISON: October 15, 2017    VIEWS:   AP frontal    IMAGES:  1    FINDINGS: Cardiomegaly seen    Interstitial prominence in the lung seen suggest congestion  Biventricular pacemaker is seen  Lungs are hypoventilated    Visualized osseous structures appear within normal limits for the patient's age  Impression Interstitial prominence in the lungs suggest congestion, mildly decreased  No worsening seen      Workstation performed: [id number]       Results for orders placed during the hospital encounter of 10/14/17   XR chest 2 views    Narrative CHEST     INDICATION:  Shortness of breath  COMPARISON:  4/13/2017    VIEWS:  Frontal and lateral projections    IMAGES:  2    FINDINGS:         Heart shadow is enlarged but unchanged from prior exam   Status post median sternotomy  Left chest wall AICD device  Element of chronic or recurrent pulmonary venous hypertension may be present  No overt pulmonary edema or confluent infiltrates  No pneumothorax or discernible pleural effusions  Visualized osseous structures appear within normal limits for the patient's age  Impression Mild interstitial edema suggested which may be chronic and/or recurrent  No confluent infiltrates  Workstation performed: [id number]       No results found for this or any previous visit  No results found for this or any previous visit  Results for orders placed during the hospital encounter of 04/13/17   CT abdomen pelvis wo contrast    Narrative CT ABDOMEN AND PELVIS WITHOUT IV CONTRAST    INDICATION:  Lower back pain since falling last night  Worse today  COMPARISON: None  TECHNIQUE:  CT examination of the abdomen and pelvis was performed without intravenous contrast   Reformatted images were created in axial, sagittal, and coronal planes  Radiation dose length product (DLP) for this visit:  735 mGy-cm     This examination, like all CT scans performed in the [facility], was performed utilizing techniques to minimize radiation dose exposure, including the use of iterative   reconstruction and automated exposure control  FINDINGS:    ABDOMEN    LOWER CHEST:  There are minimal pleural effusions  There is cardiomegaly  There is a pacemaker  Bibasilar passive atelectatic changes are noted  LIVER/BILIARY TREE:  Unremarkable  GALLBLADDER:  No calcified gallstones  No pericholecystic inflammatory change  SPLEEN:  Unremarkable  PANCREAS:  Unremarkable  ADRENAL GLANDS:  Unremarkable  KIDNEYS/URETERS:  No acute findings  There is some atrophy noted  STOMACH AND BOWEL:  There is colonic diverticulosis without diverticulitis  APPENDIX:  No findings to suggest appendicitis  ABDOMINOPELVIC CAVITY:  No ascites or free intraperitoneal air  No lymphadenopathy  VESSELS:  Atherosclerotic changes are present  No evidence of aneurysm  PELVIS    REPRODUCTIVE ORGANS:  Unremarkable for patient's age  URINARY BLADDER:  Unremarkable  ABDOMINAL WALL/INGUINAL REGIONS:  No traumatic injuries are seen  OSSEOUS STRUCTURES:  No acute fracture or destructive osseous lesion  Impression No acute traumatic findings  Trace pleural effusions and cardiomegaly  Mild renal atrophy      Workstation performed: [id number]       Results for orders placed in visit on 01/04/16   US retroperitoneal complete    Narrative EXAM ROOM =    EXAM START = [id number]   EXAM STOP = [id number]   FILMS USED =       RENAL ULTRASOUND      INDICATION- Kidney disease  COMPARISON- None  TECHNIQUE-   Ultrasound of the retroperitoneum was performed with a   curvilinear transducer utilizing volumetric sweeps and still imaging   techniques  FINDINGS-   KIDNEYS-   Right kidney-  10 9 x 4 4 cm  Normal echogenicity and contour  No suspicious masses detected  No hydronephrosis  No shadowing calculi  No perinephric fluid collections  Left kidney-  10 4 x 4 8 cm  Partial column of Aleksandar  Normal echogenicity and contour      No suspicious masses detected  No hydronephrosis  No shadowing calculi  No perinephric fluid collections  URETERS-   Nonvisualized  BLADDER-    Normally distended  No focal thickening or mass lesions  Neither ureteral jet visible  Prostate measures 5 5 x 6 1 x 4 9 cm  IMPRESSION-    Unremarkable kidneys  No hydronephrosis  Enlarged prostate  Transcribed on- [address] [facility], RAD DO        Reading Radiologist- CHOLO Mcnair DO        Electronically Signed- CHOLO Mcnair DO        Released Date Time- 01/05/16 1423    ------------------------------------------------------------------------------   READ BY = 3540^ROCKY VALDEZ   RELEASED BY = 2966^ROCKY VALDEZ        PLAN / RECOMMENDATIONS      1  MADELIN on top of CKD stage 4  Multifactorial and suspect ATN now in the setting of hypotension       Patient developed hypotension last night and currently on Levophed drip 5 mcg/hr + also found to have acute cholecystitis  Overnight patient's renal function has worsened further to creatinine of 4 05  Patient's renal function has continued to worsen and current clinical setting worsening renal function is life-threatening without initiation of dialysis and since patient is requiring Levophed drip will plan to initiate CRRT     I have discussed in length with patient regarding initiation of dialysis and he is agreeable to proceed to dialysis today     Dialysis procedure has been fully reviewed with the patient and written informed consent has been obtained  Also discussed with ICU team to have right IJ temporary dialysis catheter placement for initiation of dialysis today    Since patient's current potassium is 4 7 will plan to use K2 calcium 3 bath with CRRT  Will plan to run CRRT even today  Plan to check renal hepatitis panel  2   Azotemia    Slowly worsening with current BUN of 86 and as mentioned earlier with worsening renal function current worsening BUN level can be life-threatening without initiation of dialysis  Plan to initiate CRRT as mentioned earlier  Plan was also d/w the ICU team     Oseas Valdez MD  Nephrology  10/16/2017        Portions of the record may have been created with voice recognition software  Occasional wrong word or "sound a like" substitutions may have occurred due to the inherent limitations of voice recognition software  Read the chart carefully and recognize, using context, where substitutions have occurred

## 2017-10-17 NOTE — PROGRESS NOTES
Acute drop in patient pressure, MAPs in 40s, requiring increase need for levophed  Patient stated he felt as though he needed to have a bowel movement  Went unresponsive  A-line went flat  Patient found to have no pulse and no spontaneous breathing  Code blue called at this time and CPR initiated  ACLS algorithm followed  Critical care AP and Dr Demetrius Rajan at bedside  Wife was called at this time  Vaso, Levo, Epi, and Dopamine drips maxed out  Bedside ECHO and xray performed  Time of death called at [facility] 64- patient's wife at bedside with Dr Demetrius Rajan and [address] AP

## 2017-10-17 NOTE — DISCHARGE SUMMARY
Discharge Summary   Edson Cousins 65 y o  male MRN: [id number]    [address] [facility] Room / Bed: / Encounter: [id number]      Admitting Provider: Alexy Gamboa MD  Discharge Provider: Valeriy Ross MD  Admission Date: 10/14/2017     Discharge Date: No discharge date for patient encounter  Primary Care Physician at Discharge: Whitney Ramirez [phone]    Primary Discharge Diagnosis  Principal Problem:    Acute on chronic diastolic CHF (congestive heart failure) ([facility])  Active Problems:    Essential hypertension    Hyperkalemia    Diabetes mellitus with hyperglycemia, with long-term current use of insulin ([facility])    Cardiac defibrillator in place    Stage 4 chronic kidney disease ([address] )    Parkinson disease ([address] )    Elevated troponin    MADELIN (acute kidney injury) ([address] )    Hypertensive CKD (chronic kidney disease)    Azotemia  Resolved Problems:    * No resolved hospital problems   *      Other Problems Addressed  Patient Active Problem List    Diagnosis Date Noted    Azotemia 10/15/2017    Parkinson disease ([address] ) 10/14/2017    Elevated troponin 10/14/2017    MADELIN (acute kidney injury) ([address] ) 10/14/2017    Hypertensive CKD (chronic kidney disease) 10/14/2017    Stage 4 chronic kidney disease ([address] ) 04/17/2017    Hypertensive CKD (chronic kidney disease) 04/17/2017    Secondary hyperparathyroidism of renal origin ([address] ) 04/17/2017    Acute on chronic diastolic CHF (congestive heart failure) ([facility] ) 04/13/2017    Essential hypertension 04/13/2017    Chronic low back pain without sciatica 04/13/2017    Hyperkalemia 04/13/2017    Diabetes mellitus with hyperglycemia, with long-term current use of insulin ([facility] ) 04/13/2017    Thrombocytopenia ([address] ) 04/13/2017    Cardiac defibrillator in place 04/13/2017       Consulting Providers   Dr Melgoza-nephrology  Dr Ray-urology  Dr Boris Srivastava surgery  Dr Day-cardiology    Diagnostic Procedures Performed  XR chest portable ICU   Final Result      Perihilar pulmonary edema  Cardiomegaly  Satisfactory endotracheal tube positioning  Workstation performed: [id number]         XR chest portable   Final Result   Reported femoral line not visible on this portal chest x-ray  Consider x-ray of abdomen  ##imslh##imslh         Workstation performed: [id number]         US right upper quadrant   Final Result      Acute cholecystitis  Sludge/debris is present in the gallbladder but no definite obstructing stone is seen  ##imslh##imslh         Workstation performed: [id number]         XR chest portable   Final Result   Interstitial prominence in the lungs suggest congestion, mildly decreased   No worsening seen         Workstation performed: [id number]         CT chest abdomen pelvis wo contrast   Final Result   1  Bibasilar pleural effusions with atelectasis and asymmetric pulmonary opacities, right greater than left  Findings likely reflect CHF with asymmetric pulmonary edema  Infiltrates less likely though not entirely excluded  2  Probable vicarious excretion of the gallbladder  Findings are consistent with the preliminary report from [facility] which was provided shortly after completion of the exam                       Workstation performed: [id number]         XR chest portable ICU   Final Result      Cardiomegaly with persistent central vascular prominence  Workstation performed: [id number]         XR chest 2 views   Final Result      Mild interstitial edema suggested which may be chronic and/or recurrent  No confluent infiltrates               Workstation performed: [id number]         IR tube placement william    (Results Pending)       Treatments   IV hydration and dialysis: Hemodialysis-CRRT  Procedures   percutaneous drainage catheter placement and Right femoral temporary HD cath  Other Pertinent Test Results  10/16-EF 45%, mild hypokinesis of lateral wall and inferior wall  Grade 2 diastolic dysfunction  Presenting Problem/History of Present Illness  Acute on chronic diastolic CHF (congestive heart failure) [facility])    Hospital Course    HPI  Per H&P "Allen Walls is a 65 y o  male with past medical history pertinent for CHF, diabetes , hypertension, Parkinson's disease and CKD stage 4 who presents with acute 1 day onset of shortness of breath, with no associated chest pain, dizziness, palpitations, syncope or leg swelling  Patient recently had a change in his medication regimen, his Lasix, and Coreg were both decreased  Patient has underlying Parkinson's disease and tends to have low blood pressure  He was recently seen by his neurologist, nephrologist and cardiologist  On presentation in the ED patient was hypoxic at 88% on room air, chest x-ray preliminary per ED attending visualized vascular congestion ,he received 80 mg IV Lasix, he reports it did not improve his breathing status  Currently patient states he feels that he cannot get a breath in or out  Denies any chest pain or pressure, denies any neck or jaw pain "    The patient was admitted to the medical surgical floor with acute on chronic systolic heart failure and SOB  He was being given lasix, but did not seem to be appropriately diuresing  He had worsening SOB and was transferred to the stepdown unit for bipap and a lasix infusion  He was seen in consult with cardiology and nephrology  He initially had improvement in his symptoms  He was also started on milrinone for inotropic support  He became hypotensive and the milrinone was stopped  He required norepinephrine infusion to support his blood pressure  Given his heart failure and significant fluid volume overload nephrology recommended CRRT  The agreed and a dialysis line was placed and CRRT was started  He also had a RUQ ultrasound that showed acute cholecystitis  IR was consulted and placed a perc william drain at bedside    The patient tolerated the procedure well  He remained on norepinephrine for blood pressure support and was intermittently on bipap for comfort  On the morning of 10/17 the patient became acutely obtunded, cyanotic and suffered a cardiac arrest   ACLS protocol was initiated and followed  Please see code blue documentation for details  Labs just prior and during the code did not show any significant metabolic or hemologic derangements  CXR confirmed proper placement of endotracheal tube and no pneumothorax  Alteplase was given, although there was no confirmation of acute pulmonary embolus  After an extensive effort, resuscitation efforts were ceased and the patient was declared dead  The time of death was 36  The patient's wife was called during resuscitation efforts and arrived shortly after the termination of efforts  She declined an autopsy  Physical Exam  Pt asystolic on monitor  Pupils fixed and dilated  No reflexes present  No audible heart tones  No spontaneous respirations  Arterial line monitoring without waveform  Time of death 36  Discharge Disposition:     Discharged With Lines: no    Test Results Pending at Discharge  no  Medications   See after visit summary for reconciled discharge medications provided to patient and family  Allergies  No Known Allergies  Discharge Condition:       Code Status: Level 1 - Full Code  Advance Directive and Living Will: <no information>  Power of :    POLST:      Discharge  Statement   I spent 29 minutes discharging the patient  This time was spent on the day of discharge  I had direct contact with the patient on the day of discharge  Additional documentation is required if more than 30 minutes were spent on discharge

## 2017-10-17 NOTE — PROCEDURES
Intubation  Date/Time: 10/17/2017 7:28 AM  Performed by: Roberta Graft by: Carlos Rivera     Patient location:  ED  Other Assisting Provider: Yes (comment) Ilan Lerma PA-C)    Consent:     Consent obtained:  Emergent situation  Universal protocol:     Patient identity confirmed:  Arm band  Pre-procedure details:     Patient status:  Unresponsive    Mallampati score:  2    Pretreatment medications:  None    Paralytics:  None  Indications:     Indications for intubation: respiratory failure and hypoxemia    Procedure details:     Preoxygenation:  Bag valve mask    CPR in progress: yes      Intubation method:  Oral    Oral intubation technique: Video laryngoscope  Laryngoscope blade: Mac 4    Tube size (mm):  8 0    Tube type:  Cuffed and hi-lo    Number of attempts:  1    Ventilation between attempts: no      Cricoid pressure: no      Tube visualized through cords: yes    Placement assessment:     ETT to lip:  26    Tube secured with:  ETT davison    Breath sounds:  Equal    Placement verification: chest rise, condensation, CXR verification, direct visualization, equal breath sounds and ETCO2 detector      CXR findings:  ETT in proper place  Post-procedure details:     Patient tolerance of procedure:   Tolerated well, no immediate complications

## 2017-10-17 NOTE — PROGRESS NOTES
Progress Note - Critical Care   Dean Weston 65 y o  male MRN: [id number]  Unit/Bed#:  Encounter: [id number]    Attending Physician: Jeffrey Silva MD  ______________________________________________________________________  Assessment and Plan:   1  Shock- likely septic  2  Acute cholecystitis  3  Acute hypoxic respiratory failure  4  MADELIN on CKD stage 4 on CVVH  5  Acute on chronic systolic heart failure  6  Likely NSTEMI type 2  7  Anemia   8  Hyperphosphotemia  9  Diabetes with hyperglycemia  10  Hematuria  11  Anion gap metabolic acidosis    Neuro: Frequent neuro checks, would begin medication for pain    CV: Close hemodynamic monitoring, continue to titrate Levophed as able, continue vasopressin, observe off diuretics, appreciate cardiology recommendations    Pulm: Continue bipap for times of sleep, trial off as tolerated during the day   Encourage good pulmonary hygiene, continue scheduled nebulizers    GI: Frequent abdominal exam, attempt diet when off bipap, follow drain output, appreciate surgery recommendations    : Close intake and output, CVVH presently running evenly, daily weights, trend serum creatinine, appreciate nephrology recommendations, bacitracin to meatus, continue hermosillo, appreciate urology recommendations    F/E/N: CVVH even, replete electrolytes as needed, consider limited oral diet when off bipap    ID: Day 3 of Zosyn/azithromycin/vancomycin, follow culture results, given likely intra-abdominal source, would discontinue azithromycin and consider discontinuing vancomycin, follow temperature and white count    Heme: Bleeding at meatus has resolved, resume DVT prophylaxis    Endo: Continue insulin infusion while patient hemodynamically unstable     Msk/Skin: Frequent turning and repositioning, patient endorsing significant calf pain on the right with tenderness to palpation, consider lower extremity duplex    Disposition: Remain ICU    Code Status: Level 1 - Full Code    Counseling / Coordination of Care  Total Critical Care time spent 39 minutes excluding procedures, teaching and family updates  ______________________________________________________________________    Chief Complaint: "My leg hurts"    24 Hour Events: Patient underwent a perc-william for acute cholecystitis, had increased pressor requirement, and started on CVVH  ______________________________________________________________________    Physical Exam:   Gen: Awake, alert, cooperative, in mild distress  Neuro: GCS 15, moving all extremities, sensation intact  HEENT: Atraumatic, PERRL, conjugate gaze, face mask in place, trachea midline, no JVD  CV: Regular rate and rhythm  Pulm: Lungs clear to auscultation bilaterally  GI: Abdomen mildly distended, tender diffusely to palpation, soft, bowel sounds present, right sided drainage tube present with bilious material  : Calabrese in place with yellow urine  MSK: Extremities atraumatic, 1+ right lower extremity edema with tenderness to palpation, no erythema or warmth  Skin: Warm, dry  ______________________________________________________________________  Vitals:    10/16/17 1815 10/16/17 2005 10/16/17 2007 10/16/17 2340   BP: 120/61      Pulse: 82      Resp: (!) 29      Temp: 99 °F (37 2 °C)      TempSrc:       SpO2: 97% 98% 99% 100%   Weight:       Height:           Temperature:   Temp (24hrs), Av 3 °F (37 4 °C), Min:84 4 °F (29 1 °C), Max:101 5 °F (38 6 °C)    Current Temperature: 99 °F (37 2 °C)  Weights:   IBW: 61 5 kg    Body mass index is 28 14 kg/m²    Weight (last 2 days)     Date/Time   Weight    10/16/17 0600  76 7 (169 09)    10/15/17 0419  76 7 (169 09)            Hemodynamic Monitoring:  N/A     Non-Invasive/Invasive Ventilation Settings:  Respiratory    Lab Data (Last 4 hours)    None         O2/Vent Data (Last 4 hours)      10/16 2340          Non-Invasive Ventilation Mode BiPAP                 Lab Results   Component Value Date    PHART 7 374 10/16/2017    THK9QTU 30 4 (L) 10/16/2017    PO2ART 84 4 10/16/2017    XJJ4PQS 17 3 (L) 10/16/2017    BEART -6 8 10/16/2017    SOURCE Line, Arterial 10/16/2017     SpO2: SpO2: 100 %, SpO2 Activity: SpO2 Activity: At Rest, SpO2 Device: O2 Device: Nasal cannula  Intake and Outputs:  I/O       10/15 0701 - 10/16 0700 10/16 0701 - 10/17 0700    P  O  540     I V  (mL/kg) 659 9 (8 6) 770 7 (10)    IV Piggyback 1150 350    Total Intake(mL/kg) 2349 9 (30 6) 1120 7 (14 6)    Urine (mL/kg/hr) 1725 (0 9) 175 (0 1)    Drains  50 (0)    Other  39 (0)    Stool 0 (0)     Total Output 1725 264    Net +624 9 +856 7          Unmeasured Stool Occurrence 0 x           UOP: 5/hour     Nutrition:        Diet Orders            Start     Ordered    10/15/17 1739  Diet NPO  Diet effective now     Question Answer Comment   Diet Type NPO    RD to adjust diet per protocol? Yes        10/15/17 1739          Labs:     Results from last 7 days  Lab Units 10/16/17  1800 10/16/17  1154 10/16/17  0437 10/16/17  0106 10/15/17  0436  10/14/17  0753   WBC Thousand/uL  --   --  9 05 9 71 10 74*  --  11 18*   HEMOGLOBIN g/dL 10 4* 10 4* 10 8* 10 3* 12 8  --  13 4   I STAT HEMOGLOBIN   --   --   --   --   --   < >  --    HEMATOCRIT %  --   --  33 8* 31 9* 38 2  --  41 3   PLATELETS Thousands/uL  --   --  122* 116* 143*  < > 155   NEUTROS PCT %  --   --  79*  --  74  --  79*   MONOS PCT %  --   --  12  --  12  --  9   < > = values in this interval not displayed      Results from last 7 days  Lab Units 10/16/17  1800 10/16/17  1154 10/16/17  0439 10/16/17  0437  10/14/17  0753   SODIUM mmol/L 141 138 139  --   < > 137   POTASSIUM mmol/L 4 2 4 2 4 7  --   < > 5 8*   CHLORIDE mmol/L 104 100 100  --   < > 103   CO2 mmol/L 20* 19* 21  --   < > 25   BUN mg/dL 102* 99* 86*  --   < > 60*   CREATININE mg/dL 4 67* 4 45* 4 05*  --   < > 3 18*   CALCIUM mg/dL 7 9* 8 4 8 5  --   < > 8 9   TOTAL PROTEIN g/dL  --   --   --  6 6  --  6 9   BILIRUBIN TOTAL mg/dL  --   --   --  3 10*  --  0 70 ALK PHOS U/L  --   --   --  145*  --  162*   ALT U/L  --   --   --  85*  --  53   AST U/L  --   --   --  89*  --  29   GLUCOSE RANDOM mg/dL 259* 301* 261*  --   < > 256*   GLUCOSE, ISTAT   --   --   --   --   < >  --    < > = values in this interval not displayed  Results from last 7 days  Lab Units 10/16/17  1800 10/16/17  1154 10/16/17  0439   MAGNESIUM mg/dL 2 6 2 7* 2 6     Lab Results   Component Value Date    PHOS 6 0 (H) 10/16/2017    PHOS 6 0 (H) 10/16/2017    PHOS 3 0 09/18/2017    PHOS 3 0 11/24/2015        Results from last 7 days  Lab Units 10/16/17  1415   INR  1 58*       0  Lab Value Date/Time   TROPONINI 1 18 (H) 10/15/2017 0831   TROPONINI 1 29 (H) 10/15/2017 0436   TROPONINI 1 09 (H) 10/15/2017 0022   TROPONINI 0 95 (H) 10/14/2017 2102   TROPONINI 0 93 (H) 10/14/2017 1758   TROPONINI 0 90 (H) 10/14/2017 1515   TROPONINI 0 77 (H) 10/14/2017 1206   TROPONINI 0 66 (H) 10/14/2017 0753   TROPONINI 0 49 (H) 04/14/2017 0412   TROPONINI 0 52 (H) 04/13/2017 2353   TROPONINI 0 67 (H) 04/13/2017 2248       Results from last 7 days  Lab Units 10/16/17  1154 10/16/17  0438 10/16/17  0314   LACTIC ACID mmol/L 2 2* 2 2* 2 2*     ABG:  Lab Results   Component Value Date    PHART 7 374 10/16/2017    RDM1MCC 30 4 (L) 10/16/2017    PO2ART 84 4 10/16/2017    [id number] 17 3 (L) 10/16/2017    BEART -6 8 10/16/2017    SOURCE Line, Arterial 10/16/2017       Imaging:   10/16 CXR- Cardiac size enlarged, no vascular congestion  10/16 ECHO- EF 45% mild hypkinesis of the lateral and inferior wall grade 2 diastolic dysfunction, left atrium mildly to moderately dilated, moderate mitral regurgitation, trace aortic regurgitation, moderate tricuspid regurgitation, mild pulmonic regurgitation  10/16 RUQ US- Acute cholecystitis I have personally reviewed pertinent reports     and I have personally reviewed pertinent films in PACS    EKG: Review of telemetry demonstrates paced rhythm    Micro:  Lab Results   Component Value Date BLOODCX No Growth at 24 hrs  10/15/2017    BLOODCX No Growth at 24 hrs  10/15/2017    URINECX No Growth <1000 cfu/mL 10/14/2017       Allergies: No Known Allergies    Medications:   Scheduled Meds:  azithromycin 500 mg Intravenous Q24H   docusate sodium 100 mg Oral BID   erythromycin 0 5 inch Both Eyes HS   ipratropium 0 5 mg Nebulization TID   levalbuterol 1 25 mg Nebulization TID   piperacillin-tazobactam 2 25 g Intravenous Q6H   senna-docusate sodium 1 tablet Oral HS   vancomycin 15 mg/kg Intravenous Q24H     Continuous Infusions:  insulin regular (HumuLIN R,NovoLIN R) infusion 0 3-21 Units/hr Last Rate: 0 5 Units/hr (10/16/17 2211)   norepinephrine 1-30 mcg/min Last Rate: 14 mcg/min (10/16/17 2245)   nx stage k 2/ca 3 5,000 mL    vasopressin (PITRESSIN) in 0 9 % sodium chloride 100 mL 0 03 Units/min Last Rate: 0 03 Units/min (10/16/17 2032)     PRN Meds:    acetaminophen 650 mg Q6H PRN   HYDROmorphone 0 2 mg Q3H PRN   oxyCODONE 2 5 mg Q4H PRN   oxyCODONE 5 mg Q4H PRN   polyvinyl alcohol 2 drop Q3H PRN       VTE Pharmacologic Prophylaxis: Reason for no pharmacologic prophylaxis hematuria  VTE Mechanical Prophylaxis: sequential compression device    Invasive lines and devices: Invasive Devices     Central Venous Catheter Line            CVC Central Lines 10/16/17 Triple 16cm less than 1 day          Peripheral Intravenous Line            Peripheral IV 10/15/17 Left Antecubital 1 day    Peripheral IV 10/15/17 Right Wrist 1 day          Arterial Line            Arterial Line 10/15/17 Radial 1 day          Hemodialysis Catheter            HD Cath Triple less than 1 day          Drain            Urethral Catheter Temperature probe 16 Fr  2 days    Closed/Suction Drain Right Abdomen Other (Comment) 8 5 Fr  less than 1 day                     Portions of the record may have been created with voice recognition software    Occasional wrong word or "sound a like" substitutions may have occurred due to the inherent limitations of voice recognition software  Read the chart carefully and recognize, using context, where substitutions have occurred      JOSE MIGUEL Gregory

## 2017-10-17 NOTE — PROGRESS NOTES
Multiple attempts to initiate/continue CVVH throughout the night  Much difficulty maintaining open access due to positional femoral line  Patient went through two cartridges this shift  Per AP Robe Chester to leave down, will reassess this AM  Patient stable at this time, will continue to monitor

## 2017-10-18 ENCOUNTER — GENERIC CONVERSION - ENCOUNTER (OUTPATIENT)
Dept: OTHER | Facility: OTHER | Age: 77
End: 2017-10-18

## 2017-10-19 LAB
BACTERIA SPEC BFLD CULT: NO GROWTH
GRAM STN SPEC: NORMAL

## 2017-10-20 LAB
BACTERIA BLD CULT: NORMAL
BACTERIA BLD CULT: NORMAL

## 2018-01-11 NOTE — PROCEDURES
Procedures by Lily Mata at 10/16/2017   1:04 PM      Author:  JOSE MIGUEL Jarvis Service:  Critical Care/ICU Author Type:  Nurse Practitioner    Filed:  10/16/2017  5:55 PM Date of Service:  10/16/2017  1:04 PM Status:  Attested    :  Lily Mata (Nurse Practitioner)  Cosigner:  Christal Sanford MD at 10/16/2017  6:09 PM      Procedure Orders:       1  CENTRAL LINE [[id number]] ordered by Lily Mata at 10/16/17 1744                 Post-procedure Diagnoses:       1  MADELIN (acute kidney injury) ([facility] [address] ) [N17 9]              Attestation signed by Christal Sanford MD at 10/16/2017  6:09 PM      I was present and assisted with the entire procedure  Cecilio Taylor MD                   Central Line  Insertion  Date/Time: 10/16/2017 5:45 PM  Performed by: Kevin Cavanaugh  Authorized by: Kevin Cavanaugh     Patient location:  Bedside  Other Assisting Provider:  Yes (comment) (Dr Corina Jasso)    Consent:     Consent obtained:  Written    Consent given by:  Patient    Risks discussed:  Arterial puncture, bleeding, incorrect placement, infection, nerve damage and pneumothorax    Alternatives discussed:  No treatment, delayed treatment, alternative treatment and observation  Universal protocol:     Procedure explained and questions answered to patient or proxy's satisfaction: yes      Imaging studies available: yes      Required blood products, implants, devices, and special equipment available:  yes      Site/side marked: yes      Immediately prior to procedure, a time out was called: yes      Patient identity confirmed:  Verbally with patient  Pre-procedure details:     Hand hygiene: Hand hygiene performed prior to insertion      Sterile barrier technique:  All elements of maximal sterile technique followed      Skin preparation:  2% chlorhexidine    Skin preparation agent: Skin preparation agent completely dried prior to procedure    Indications:     Central line indications: dialysis    Anesthesia (see MAR for exact dosages): Anesthesia method:  Local infiltration    Local anesthetic:  Lidocaine 1% w/o epi  Procedure details:     Location:  Right internal jugular    Vessel type: vein      Laterality:  Right    Approach: percutaneous technique used      Patient position:  Trendelenburg    Catheter type:  Triple lumen 16cm    Catheter size:  12 Fr    Landmarks identified: yes      Ultrasound guidance: yes      Sterile ultrasound techniques: Sterile gel and sterile probe covers were used      Number of attempts:  2    Successful placement: no    Comments:      R IJ vesssel accessed and wire easily passed and visualized in vessel with ultrasound  Unable to dilate, met significant amount of resistance  Procedure stopped at that time and manual pressure held on neck  No immediate complications  Will  get CXR                       Received for:Provider  EPIC   Oct 16 2017  6:10PM [facility] Standard Time

## 2018-01-12 VITALS
WEIGHT: 174.13 LBS | HEART RATE: 79 BPM | DIASTOLIC BLOOD PRESSURE: 66 MMHG | BODY MASS INDEX: 29.73 KG/M2 | TEMPERATURE: 97.7 F | SYSTOLIC BLOOD PRESSURE: 108 MMHG | OXYGEN SATURATION: 96 % | HEIGHT: 64 IN

## 2018-01-12 NOTE — PROCEDURES
Procedures by Mohit Diaz at 10/15/2017  9:18 PM      Author:  JOSE MIGUEL Owen Service:  Critical Care/ICU Author Type:  Nurse Practitioner    Filed:  10/15/2017  9:22 PM Date of Service:  10/15/2017  9:18 PM Status:  Attested    :  Mohit Diaz (Nurse Practitioner)  Cosigner:  Jeremy Serrato MD at 10/16/2017  1:35 AM      Procedure Orders:       1  Insert arterial line [[id number]] ordered by JOSE MIGUEL Owen at 10/15/17 2118                 Post-procedure Diagnoses:       1  Acute on chronic combined systolic and diastolic congestive heart failure ([facility] [address] ) [I50 43]              Attestation signed by Jeremy Serrato MD at 10/16/2017  1:35 AM      I was present for the duration of the entire procedure  Margarita Castle MD                   Arterial Line  Insertion  Date/Time: 10/15/2017 9:18 PM  Performed by: Tatiana Catherine by: Saintclair Cooter     Patient location:  Bedside  Other Assisting Provider:  Yes (comment) Olaf Zendejas RN)    Consent:     Consent obtained:  Verbal (Procedure, including risks and benefits was discussed by Dr Abel Thomas with the patient in person and with the patient's spouse over the phone)    Consent given by:  Patient and spouse    Risks discussed:  Bleeding, ischemia, pain and repeat procedure  Universal protocol:     Procedure explained and questions answered to patient or proxy's satisfaction: yes      Site/side marked: yes      Immediately prior to procedure a time out was called: yes      Patient identity confirmed:  Verbally with patient  Indications:     Indications: hemodynamic monitoring, continuous blood pressure monitoring and frequent labs / infusion    Pre-procedure details:     Preparation: Patient was prepped and draped in sterile fashion    Anesthesia (see MAR for exact dosages):      Anesthesia method:  Local infiltration    Local anesthetic:  Lidocaine 1% w/o epi  Procedure details:     Location / Tip of Catheter: Radial    Laterality:  Left    Farhan's test performed: yes      Farhan's test abnormal: no      Needle gauge:  20 G    Placement technique:  Ultrasound guided    Number of attempts:  2    Successful placement: yes      Transducer: waveform confirmed    Post-procedure details:     Post-procedure:  Biopatch applied, secured with tape and sutured    CMS:  Normal    Patient tolerance of procedure:   Tolerated well, no immediate complications                     Received for:Provider  EPIC   Oct 16 2017  1:35AM [facility] Standard Time

## 2018-01-12 NOTE — MISCELLANEOUS
Assessment    1  Cardiomyopathy (425 4) (I42 9)   2  Congestive heart failure (428 0) (I50 9)   3  History of Implantable Cardioverter-Defibrillator    Discussion/Summary  Discussion Summary:   Cardimyopathy/chf- cont current meds  follow with cardiology  f/u at next appt with dr Willette Kocher in august          Chief Complaint  Chief Complaint Free Text Note Form: Hospital follow-up      History of Present Illness  TCM Communication [facility]: The patient is being contacted for follow-up after hospitalization  [facility]  The date of discharge: 5/24/16  He was discharged to home  Medications reviewed and updated today  Communication performed and completed by   HPI: patient presents for hospital follow-up  He was admitted to [facility] on May 15th and discharged on May 24  He was followed for NSTEMI, ischemic cardiomyopathy and acute on chronic heart failure  He underwent placement of a biventricular cardio defibrillator  He saw Dr Tonja Kenny this morning  he feels well today and has no complaints  He denies chest pain, shortness of breath, palpitations  He denies nausea, vomiting, diarrhea or abdominal pain  No fever, chills or sweats   Congestive Heart Failure (Follow-Up): The patient presents for follow-up of chronic heart failure  Symptoms: denies lower extremity edema, denies dyspnea on exertion, denies fatigue, denies exercise intolerance, denies orthopnea and denies paroxysmal nocturnal dyspnea  Medications: the patient is adherent with his medication regimen  Review of Systems  Complete-Male:   Constitutional: No fever or chills, feels well, no tiredness, no recent weight gain or weight loss  ENT: no complaints of earache, no hearing loss, no nosebleeds, no nasal discharge, no sore throat, no hoarseness  Cardiovascular: No complaints of slow heart rate, no fast heart rate, no chest pain, no palpitations, no leg claudication, no lower extremity     Respiratory: No complaints of shortness of breath, no wheezing, no cough, no SOB on exertion, no orthopnea or PND  Gastrointestinal: No complaints of abdominal pain, no constipation, no nausea or vomiting, no diarrhea or bloody stools  ROS Reviewed:   ROS reviewed  Active Problems     1  Abdominal bruit (785 9) (R09 89)   2  Acid reflux (530 81) (K21 9)   3  Ambulatory dysfunction (719 7) (R26 2)   4  Benign essential hypertension (401 1) (I10)   5  CAD in native artery (414 01) (I25 10)   6  CKD (chronic kidney disease), stage III (585 3) (N18 3)   7  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 (414 04)   8  Diabetes mellitus type 2 with neurological manifestations (250 60) (E11 49)   9  DMII (diabetes mellitus, type 2) (250 00) (E11 9)   10  Edema (782 3) (R60 9)   11  History of benign prostatic hypertrophy (V13 89) (Z87 438)   12  History of coronary artery bypass graft (V45 81) (Z95 1)   13  Hypertension (401 9) (I10)   14  Lower back pain (724 2) (M54 5)   15  Lumbar stenosis with neurogenic claudication (724 03) (M48 06)   16  Parkinson's disease (332 0) (G20)   17  Parkinsons disease, secondary (332 1) (G21 9)   18  Peripheral vascular disease (443 9) (I73 9)   19  Polyneuropathy in diabetes (250 60,357 2) (E11 42)   20  PVD (peripheral vascular disease) (443 9) (I73 9)   21  Renal failure (586) (N19)   22  Secondary hyperparathyroidism, renal (588 81) (N25 81)   23  Shortness of breath (786 05) (R06 02)   24  Type 2 diabetes with stage 3 chronic kidney disease GFR 30-59 (250 40,585 3)    (E11 22,N18 3)    Arteriosclerosis of coronary artery (414 00) (I25 10)       Hyperlipidemia (272 4) (E78 5)       Chronic kidney disease (585 9) (N18 9)       Gout (274 9) (M10 9)       Congestive heart failure (428 0) (I50 9)       CABG       Asymptomatic carotid artery stenosis (433 10) (I65 29)       Cardiomyopathy (425 4) (I42 9)          Past Medical History    1  History of Allergic rhinitis (737 9) (J30 9)   2  History of Cardiovascular Symptoms (785 9)   3  History of Chronic Pulmonary Edema (514)   4  History of Fainting (780 2) (R55)   5  History of breast lump (V13 89) (Z87 898)   6  History of chest pain (V13 89) (Z87 898)   7  History of chronic obstructive lung disease (V12 69) (Z87 09)   8  History of fatigue (V13 89) (Z87 898)   9  History of gastrointestinal hemorrhage (V12 79) (Z87 19)   10  History of hypotension (V12 59) (Z86 79)   11  History of Insomnia w/ sleep apnea (780 51) (G47 00,G47 30)   12  History of Malaise (780 79) (R53 81)   13  History of Neck pain (723 1) (M54 2)   14  Old myocardial infarction (412) (I25 2)   15  History of Poison ivy dermatitis (692 6) (L23 7)   16  History of Polyuria (788 42) (R35 8)   17  History of Prostate cancer screening (V76 44) (Z12 5)   18  History of Special screening examination for neoplasm of prostate (V76 44) (Z12 5)    Surgical History     1  History of Appendectomy   2  History of Cataract Surgery   3  History of Implantable Cardioverter-Defibrillator   4  History of Neuroplasty Decompression Median Nerve At Carpal Tunnel   5  History of Tonsillectomy  Surgical History Reviewed: The surgical history was reviewed and updated today  CABG             Family History  Mother    1  Family history of diabetes mellitus (V18 0) (Z83 3)  Father    2  Family history of cerebrovascular accident (V17 1) (Z82 3)  Maternal Grandmother    3  FH: Parkinson's disease (V17 2) (Z82 0)  Family History    4  Family history of lung cancer (V16 1) (Z80 1)  Family History Reviewed: The family history was reviewed and updated today  Social History    · Always uses seat belt   · Daily caffeine consumption, 2-3 servings a day   · Denied: Drug use (305 90) (F19 90)   · Home Durable Medical Equipment (DME)   ·    · Never A Smoker   · Never Drank Alcohol  Social History Reviewed: The social history was reviewed and updated today  Current Meds   1   Allopurinol 100 MG Oral Tablet; TAKE ONE TABLET BY MOUTH ONCE DAILY; Therapy: 53GOZ2068 to (Pedro Scales)  Requested for: 70MOD7285; Last   Rx:58Dox8502 Ordered   2  Atorvastatin Calcium 80 MG Oral Tablet; Take 1 tablet daily; Last Rx:91Pno4559 Ordered   3  Azilect 1 MG Oral Tablet; TAKE ONE TABLET BY MOUTH ONCE DAILY; Therapy: 04LMK6176 to (Evaluate:01Oct2016); Last Rx:44Fjj8425 Ordered   4  BD Insulin Syringe Ultrafine 30G X 1/2" 0 3 ML Miscellaneous; USE AS DIRECTED; Therapy: 66BUN2714 to (Last Rx:15Apr2015)  Requested for: 77Ipg8802 Ordered   5  BD Insulin Syringe Ultrafine 30G X 1/2" 1 ML Miscellaneous; Therapy: 63FWY0967 to (Evaluate:48Upe3637) Recorded   6  Calcitriol 0 5 MCG Oral Capsule; TAKE 1 CAPSULE Every morning; Therapy: (Recorded:68Zyz4785) to Recorded   7  Carvedilol 12 5 MG Oral Tablet; Take 1 tablet daily; Therapy: 10KTJ2026 to (Evaluate:02Apr2017)  Requested for: 83Wof9802 Recorded   8  Clopidogrel Bisulfate 75 MG Oral Tablet; TAKE 1 TABLET EVERY OTHER DAY; Therapy: 32NEW3823 to  Requested for: 73Nrc3507 Recorded   9  Colcrys 0 6 MG Oral Tablet; TAKE ONE TABLET BY MOUTH EVERY DAY  AS DIRECTED; Therapy: 37YCB4148 to (Evaluate:99Ggv8754)  Requested for: 43FHT7469; Last   Rx:20Qrr5946 Ordered   10  Ecotrin Low Strength 81 MG Oral Tablet Delayed Release; TAKE 1 TABLET EVERY    OTHER DAY Recorded   11  Furosemide 80 MG Oral Tablet; take one tablet by mouth twice daily; Therapy: 05Jpa5876 to (Evaluate:36Chu4463)  Requested for: 87Rdl5769; Last    Rx:11Zfj9537 Ordered   12  HumaLOG 100 UNIT/ML Subcutaneous Solution; INJECT 15 UNIT Before meals; Therapy: 07VPZ9196 to (Renew:26Oct2016)  Requested for: 39SOV0400 Recorded   13  Isosorbide Mononitrate ER 60 MG Oral Tablet Extended Release 24 Hour; TAKE ONE    TABLET BY MOUTH ONCE DAILY; Therapy: 05Inf1429 to (Gia Appiah)  Requested for: 89TBG2771; Last    Rx:02Efo1499 Ordered   14   Lantus 100 UNIT/ML Subcutaneous Solution; INJECT 45 UNIT Twice daily; Therapy: 14LZW2661 to (Renew:19Nov2016)  Requested for: 59RJL9652 Recorded   15  Losartan Potassium 25 MG Oral Tablet; TAKE ONE TABLET BY MOUTH ONCE DAILY; Therapy: 17LVG8841 to (Evaluate:10Nov2016)  Requested for: 65GOD0358; Last    Rx:43Aae8494 Ordered   16  Nitroglycerin 0 4 MG SUBL; DISSOLVE 1 TABLET UNDER THE TONGUE AS NEEDED    FOR CHEST PAIN Recorded   17  Pantoprazole Sodium 40 MG Oral Tablet Delayed Release; TAKE ONE TABLET BY    MOUTH ONCE DAILY; Therapy: 39Pqn9966 to (Evaluate:36Spt0385)  Requested for: 14Apr2016; Last    Rx:18Luq0457 Ordered   18  Tamsulosin HCl - 0 4 MG Oral Capsule; TAKE 2 CAPSULE Bedtime; Therapy: (Recorded:13Apr2016) to Recorded  Medication List Reviewed: The medication list was reviewed and updated today  Allergies    1  No Known Drug Allergies    Vitals  Signs [Data Includes: Current Encounter]   Recorded: 22APQ4742 02:03PM   Heart Rate: 80  Systolic: 411  Diastolic: 64  Height: 5 ft 5 in  Weight: 182 lb 2 08 oz  BMI Calculated: 30 31  BSA Calculated: 1 9    Physical Exam    Constitutional   General appearance: Abnormal   chronically ill  Ears, Nose, Mouth, and Throat   Oropharynx: Normal with no erythema, edema, exudate or lesions  Pulmonary   Respiratory effort: No increased work of breathing or signs of respiratory distress  Auscultation of lungs: Clear to auscultation, equal breath sounds bilaterally, no wheezes, no rales, no rhonci  Cardiovascular   Auscultation of heart: Normal rate and rhythm, normal S1 and S2, without murmurs  Examination of extremities for edema and/or varicosities: Normal     Abdomen   Abdomen: Non-tender, no masses  Lymphatic   Palpation of lymph nodes in neck: No lymphadenopathy  Musculoskeletal   Gait and station: Normal          Provider Comments  Provider Comments:   labs and tests have been reviewed      Health Management  DMII (diabetes mellitus, type 2)   *VB-Foot Exam; every 1 year;  Last 75YNH8784; Next Due: 80Awb4027; Active    Future Appointments    Date/Time Provider Specialty Site   08/01/2016 10:45 AM VIANCA Bowman  Cardiology [facility]   07/08/2016 09:20 AM [facility] [address]   06/17/2016 10:20 AM Rhoderick Sandhoff, MD Neurology [facility]UROLOGY [facility] OF [facility]   10/17/2016 08:30 AM Rhoderick Sandhoff, MD Neurology [address]   08/15/2016 10:00 AM VIANCA Swain   Internal Medicine [address] [place] [address]     Signatures   Electronically signed by : Osorio Cheek, [address]; Jun 1 2016  4:05PM EST                       (Author)    Electronically signed by : VIANCA Leary ; Jun 1 2016  4:14PM EST

## 2018-01-13 VITALS
TEMPERATURE: 97.4 F | RESPIRATION RATE: 16 BRPM | BODY MASS INDEX: 29.41 KG/M2 | WEIGHT: 172.25 LBS | HEART RATE: 60 BPM | DIASTOLIC BLOOD PRESSURE: 70 MMHG | SYSTOLIC BLOOD PRESSURE: 100 MMHG | HEIGHT: 64 IN

## 2018-01-13 VITALS
TEMPERATURE: 97.4 F | BODY MASS INDEX: 29.94 KG/M2 | RESPIRATION RATE: 16 BRPM | SYSTOLIC BLOOD PRESSURE: 110 MMHG | HEART RATE: 78 BPM | WEIGHT: 175.38 LBS | HEIGHT: 64 IN | DIASTOLIC BLOOD PRESSURE: 70 MMHG

## 2018-01-13 NOTE — RESULT NOTES
Verified Results  (1) BASIC METABOLIC PROFILE 33WIM0422 07:56AM KurtisJosé Antonio     Test Name Result Flag Reference   GLUCOSE,RANDM 173 mg/dL H    If the patient is fasting, the ADA then defines impaired fasting glucose as > 100 mg/dL and diabetes as > or equal to 123 mg/dL  SODIUM 137 mmol/L  136-145   POTASSIUM 4 0 mmol/L  3 5-5 3   CHLORIDE 97 mmol/L L 100-108   CARBON DIOXIDE 33 mmol/L H 21-32   ANION GAP (CALC) 7 mmol/L  4-13   BLOOD UREA NITROGEN 60 mg/dL H 5-25   CREATININE 3 68 mg/dL H 0 60-1 30   Standardized to IDMS reference method   CALCIUM 10 7 mg/dL H 8 3-10 1   eGFR Non-African American 16 2 ml/min/1 73sq [facility] [employer] Disease Education Program recommendations are as follows:  GFR calculation is accurate only with a steady state creatinine  Chronic Kidney disease less than 60 ml/min/1 73 sq  meters  Kidney failure less than 15 ml/min/1 73 sq  meters

## 2018-01-14 VITALS
SYSTOLIC BLOOD PRESSURE: 110 MMHG | WEIGHT: 170 LBS | RESPIRATION RATE: 16 BRPM | HEIGHT: 64 IN | TEMPERATURE: 97.4 F | DIASTOLIC BLOOD PRESSURE: 50 MMHG | BODY MASS INDEX: 29.02 KG/M2 | HEART RATE: 68 BPM

## 2018-01-14 VITALS
RESPIRATION RATE: 16 BRPM | SYSTOLIC BLOOD PRESSURE: 140 MMHG | BODY MASS INDEX: 28.68 KG/M2 | DIASTOLIC BLOOD PRESSURE: 62 MMHG | HEIGHT: 64 IN | WEIGHT: 168 LBS | HEART RATE: 62 BPM

## 2018-01-14 VITALS
TEMPERATURE: 97.6 F | SYSTOLIC BLOOD PRESSURE: 100 MMHG | BODY MASS INDEX: 29.45 KG/M2 | RESPIRATION RATE: 16 BRPM | HEART RATE: 78 BPM | DIASTOLIC BLOOD PRESSURE: 70 MMHG | WEIGHT: 172.5 LBS | HEIGHT: 64 IN

## 2018-01-14 VITALS
WEIGHT: 172.25 LBS | HEIGHT: 64 IN | DIASTOLIC BLOOD PRESSURE: 64 MMHG | HEART RATE: 60 BPM | SYSTOLIC BLOOD PRESSURE: 120 MMHG | BODY MASS INDEX: 29.41 KG/M2

## 2018-01-14 VITALS
DIASTOLIC BLOOD PRESSURE: 70 MMHG | BODY MASS INDEX: 29.6 KG/M2 | RESPIRATION RATE: 16 BRPM | WEIGHT: 173.38 LBS | HEART RATE: 80 BPM | TEMPERATURE: 97.5 F | SYSTOLIC BLOOD PRESSURE: 130 MMHG | HEIGHT: 64 IN

## 2018-01-15 NOTE — MISCELLANEOUS
History of Present Illness  TCM Communication [facility]: The patient is being contacted for 10/17/17-pt   He was hospitalized at [facility]  The date of admission: 10/14/17  Diagnosis: CHF  Communication performed and completed by      Active Problems    1  Abdominal bruit (785 9) (R09 89)   2  Acid reflux (530 81) (K21 9)   3  Ambulatory dysfunction (719 7) (R26 2)   4  Arteriosclerosis of coronary artery (414 00) (I25 10)   5  Asymptomatic carotid artery stenosis (433 10) (I65 29)   6  Benign essential hypertension (401 1) (I10)   7  CABG   8  CAD in native artery (414 01) (I25 10)   9  Cardiomyopathy (425 4) (I42 9)   10  Chronic kidney disease (585 9) (N18 9)   11  CKD (chronic kidney disease), stage III (585 3) (N18 3)   12  Congestive heart failure (428 0) (I50 9)   13  Coronary atherosclerosis of bypass graft (414 04) (I25 810)   14  Diabetes mellitus type 2 with neurological manifestations (250 60) (E11 49)   15  DMII (diabetes mellitus, type 2) (250 00) (E11 9)   16  Edema (782 3) (R60 9)   17  Gout (274 9) (M10 9)   18  History of benign prostatic hypertrophy (V13 89) (Z87 438)   19  History of coronary artery bypass graft (V45 81) (Z95 1)   20  Hyperlipidemia (272 4) (E78 5)   21  Hypertension (401 9) (I10)   22  Low back pain (724 2) (M54 5)   23  Lower back pain (724 2) (M54 5)   24  Lumbar stenosis with neurogenic claudication (724 03) (M48 062)   25  Parkinson's disease (332 0) (G20)   26  Parkinsons disease, secondary (332 1) (G21 9)   27  Paroxysmal ventricular tachycardia (427 1) (I47 2)   28  Peripheral vascular disease (443 9) (I73 9)   29  Polyneuropathy in diabetes (250 60,357 2) (E11 42)   30  PVD (peripheral vascular disease) (443 9) (I73 9)   31  Renal failure (586) (N19)   32  Secondary hyperparathyroidism, renal (588 81) (N25 81)   33  Shortness of breath (786 05) (R06 02)   34  Stage 4 chronic kidney disease (585 4) (N18 4)   35   Type 2 diabetes with stage 3 chronic kidney disease GFR 30-59 (250 40,585 3)    (E11 22,N18 3)    Past Medical History    1  History of Allergic rhinitis (477 9) (J30 9)   2  History of Cardiovascular Symptoms (785 9)   3  History of Chronic Pulmonary Edema (514)   4  History of Fainting (780 2) (R55)   5  History of breast lump (V13 89) (Z87 898)   6  History of chest pain (V13 89) (Z87 898)   7  History of chronic obstructive lung disease (V12 69) (Z87 09)   8  History of fatigue (V13 89) (Z87 898)   9  History of gastrointestinal hemorrhage (V12 79) (Z87 19)   10  History of hypotension (V12 59) (Z86 79)   11  History of Insomnia w/ sleep apnea (780 51) (G47 00,G47 30)   12  History of Malaise (780 79) (R53 81)   13  History of Neck pain (723 1) (M54 2)   14  Old myocardial infarction (412) (I25 2)   15  History of Poison ivy dermatitis (692 6) (L23 7)   16  History of Polyuria (788 42) (R35 8)   17  History of Prostate cancer screening (V76 44) (Z12 5)   18  History of Special screening examination for neoplasm of prostate (V76 44) (Z12 5)    Surgical History    1  History of Appendectomy   2  CABG   3  History of Cataract Surgery   4  History of Implantable Cardioverter-Defibrillator   5  History of Neuroplasty Decompression Median Nerve At Carpal Tunnel   6  History of Tonsillectomy    Family History  Mother    1  Family history of    2  Family history of diabetes mellitus (V18 0) (Z83 3)  Father    3  Family history of    4  Family history of cerebrovascular accident (V17 1) (Z82 3)  Sister    5  Family history of Healthy adult  Maternal Grandmother    6  FH: Parkinson's disease (V17 2) (Z82 0)  Family History    7  Family history of lung cancer (V16 1) (Z80 1)    Social History    · Always uses seat belt   · Daily caffeine consumption, 2-3 servings a day   · Denied: Drug use (305 90) (F19 90)   · Home Durable Medical Equipment (DME)   ·    · Never A Smoker   · Never Drank Alcohol    Current Meds   1   Allopurinol 100 MG Oral Tablet; TAKE 2 TABLET Every morning; Therapy: (Abigail Espinoza) to Recorded   2  Amiodarone HCl - 200 MG Oral Tablet; take 1 tablet by mouth every day; Therapy: 68LUC6537 to (Venus Vines)  Requested for: 45AXM9136; Last   Rx:14Nov2016 Ordered   3  Azilect 1 MG Oral Tablet (Rasagiline Mesylate); TAKE ONE TABLET BY MOUTH ONCE   DAILY; Therapy: 02LWF2870 to (Evaluate:18Mar2018)  Requested for: 51Pcp1949 Recorded   4  BD Insulin Syringe Ultrafine 30G X 1/2" 0 3 ML Miscellaneous; use as directed; Therapy: 15CFM7360 to (Veronica Keep)  Requested for: 64Kbh6875; Last   Rx:20Xjv3182 Ordered   5  BD Insulin Syringe Ultrafine 30G X 1/2" 1 ML Miscellaneous; Therapy: 17AKK1916 to (Evaluate:91Mzt1153) Recorded   6  Carvedilol 25 MG Oral Tablet; take 1/2 tablet twice a day; Therapy: 44UVC5949 to (Evaluate:10Aca7995)  Requested for: 35EUO6023; Last   Rx:88Bpn0811 Ordered   7  Ecotrin Low Strength 81 MG Oral Tablet Delayed Release; TAKE 1 TABLET EVERY   OTHER DAY Recorded   8  Furosemide 80 MG Oral Tablet; TAKE ONE TABLET BY MOUTH ONCE DAILY; Therapy: 01Apr2014 to (Evaluate:09Jan2018)  Requested for: 18Stn7439; Last   Rx:04Qnj3690 Ordered   9  HumaLOG 100 UNIT/ML Subcutaneous Solution; Inject 20 units before each meal;   Therapy: (Recorded:05Jun2017) to Recorded   10  Lantus 100 UNIT/ML Subcutaneous Solution; Inject 30 units at night; Therapy: (Recorded:05Jun2017) to Recorded   11  Losartan Potassium 25 MG Oral Tablet (Cozaar); TAKE ONE TABLET BY MOUTH ONCE    DAILY; Therapy: 57JWM3331 to (Evaluate:90Xii4715)  Requested for: 19Fcd4268; Last    Rx:05Sep2017 Ordered   12  Oxycodone-Acetaminophen 5-325 MG Oral Tablet (Percocet); TAKE 1 TABLET EVERY 6    HOURS AS NEEDED FOR PAIN;    Therapy: 58JCT3744 to (Evaluate:14Oct2017) Recorded   13  Pantoprazole Sodium 40 MG Oral Tablet Delayed Release; TAKE ONE TABLET BY    MOUTH ONCE DAILY;     Therapy: 38Rac9137 to (Evaluate:19Jan2018)  Requested for: 88UGB1979; Last VJ:26JCJ7314; Status: ACTIVE - Transmit to Pharmacy - Awaiting Verification Ordered   14  Plavix 75 MG Oral Tablet (Clopidogrel Bisulfate); take 1 tablet every other day; Therapy: (Recorded:49Jrz4762) to Recorded   15  Rosuvastatin Calcium 20 MG Oral Tablet (Crestor); Take 1/4 tablet daily; Therapy: 06Zbx5596 to (Last Rx:01Sep2017)  Requested for: 01Sep2017 Ordered   16  Tamsulosin HCl - 0 4 MG Oral Capsule; TAKE 2 CAPSULE Bedtime; Therapy: (Recorded:90Lxi7332) to Recorded    Allergies    1  No Known Drug Allergies    2  No Known Environmental Allergies   3  No Known Food Allergies    Health Management  DMII (diabetes mellitus, type 2)   *VB - Foot Exam; every 1 year; Last 20Apr2017; Next Due: 20Apr2018;  Active    Future Appointments    Date/Time Provider Specialty Site   02/23/2018 10:20 AM Cardiology, Pacemaker Clin [facility]   12/27/2017 09:20 AM Jenny Rivera MD Neurology [facility] OF [address] [place] [address]     Signatures   Electronically signed by : Darian Riggins, ; Oct 18 2017  9:00AM EST                       (Author)    Electronically signed by : VIANCA Woods ; Oct 18 2017  7:59PM EST

## 2018-01-15 NOTE — RESULT NOTES
Verified Results  (1) TSH 52Pio8526 06:54AM Екатерина Mend Order Number: [id number]     Test Name Result Flag Reference   TSH 1 360 uIU/mL  0 358-3 740   Patients undergoing fluorescein dye angiography may retain small amounts of fluorescein in the body for 48-72 hours post procedure  Samples containing fluorescein can produce falsely depressed TSH values  If the patient had this procedure,a specimen should be resubmitted post fluorescein clearance

## 2018-01-15 NOTE — RESULT NOTES
Verified Results  (1) MAGNESIUM 94Gwh3495 07:03AM Clearence Donkle Order Number: [id number]  TW Order Number: [id number] Order Number: [id number]     Test Name Result Flag Reference   MAGNESIUM 1 9 mg/dL  1 6-2 6     (1) BASIC METABOLIC PROFILE 77WBD9865 07:03AM Clearence Olivier Order Number: [id number]  TW Order Number: [id number] Order Number: [id number]     Test Name Result Flag Reference   GLUCOSE,RANDM 201 mg/dL H    If the patient is fasting, the ADA then defines impaired fasting glucose as > 100 mg/dL and diabetes as > or equal to 123 mg/dL  SODIUM 137 mmol/L  136-145   POTASSIUM 3 6 mmol/L  3 5-5 3   CHLORIDE 98 mmol/L L 100-108   CARBON DIOXIDE 29 mmol/L  21-32   ANION GAP (CALC) 10 mmol/L  4-13   BLOOD UREA NITROGEN 52 mg/dL H 5-25   CREATININE 2 91 mg/dL H 0 60-1 30   Standardized to IDMS reference method   CALCIUM 9 8 mg/dL  8 3-10 1   eGFR Non-African American 21 2 ml/min/1 73sq [facility] Disease Education Program recommendations are as follows:  GFR calculation is accurate only with a steady state creatinine  Chronic Kidney disease less than 60 ml/min/1 73 sq  meters  Kidney failure less than 15 ml/min/1 73 sq  meters

## 2018-01-16 NOTE — MISCELLANEOUS
Assessment    1  Benign essential hypertension (401 1) (I10)   2  Congestive heart failure (428 0) (I50 9)   3  CKD (chronic kidney disease), stage III (585 3) (N18 3)    Plan  Congestive heart failure    · From  Lasix 80 MG Oral Tablet Take 1 tablet daily To Lasix 80 MG Oral Tablet  (Furosemide) TAKE 1 TABLET TWICE DAILY   Rx By: Erica Scales; Dispense: 25 Days ; #:1 X 50 Tablet Bottle; Refill: 0; For: Congestive heart failure; SONIA = N; Record  DMII (diabetes mellitus, type 2)    · From  HumaLOG 100 UNIT/ML Subcutaneous Solution inject 20 units  subcutaneously before meals To HumaLOG 100 UNIT/ML Subcutaneous Solution  INJECT 10 UNITS SUBCUTANEOUSLY BEFORE MEALS   Rx By: Angelica Meraz; Dispense: 80 Days ; #:50 ML; Refill: 2; For: DMII (diabetes mellitus, type 2); SONIA = N; Record  DMII (diabetes mellitus, type 2), Type 2 diabetes with stage 3 chronic kidney disease  GFR 30-59    · From  Lantus 100 UNIT/ML Subcutaneous Solution INJECT 45 UNITS IN  THE  MORNING AND 45 UNITS AT NIGHT To Lantus 100 UNIT/ML Subcutaneous Solution  Inject 10 units at night   Rx By: Erica Scales; Dispense: 0 Days ; #:1 X 10 ML Vial; Refill: 0; For: DMII (diabetes mellitus, type 2), Type 2 diabetes with stage 3 chronic kidney disease GFR 30-59; SONIA = N; Record    Discussion/Summary  Discussion Summary:   CHF- stable  continue LAsix BID and f/u with Dr Mlaorie Dutton and Dr Jeevan Rai    CKD- F/u with Dr Jeevan Rai    HTN- stable- continue current meds    DM- please bring in some POC-glucoses fasting to be sure the lower dose of LAntus is adequate  Chief Complaint  Chief Complaint Free Text Note Form: hospital follow-up      History of Present Illness  TCM Communication [facility]: The patient is being contacted for follow-up after hospitalization  Hospital records were reviewed  He was hospitalized at [place] [address]  The date of admission: 4/13/17, date of discharge: 4/17/17  Diagnosis: chf  He was discharged to home   Medications reviewed and updated today  The patient is currently asymptomatic  Communication performed and completed by   HPI: patient presents for hospital follow-up  He was seen and followed at [facility] from April 13th to April 17th  He was treated for acute CHF exacerbation, as well as acute on chronic kidney disease  Upon discharge his Lasix was increased from 80 mg daily to 80 mg twice a day  He is due to follow-up with Dr Euna Lombard and Dr Jimy Majano  He is feeling well today and has no complaints  He said he was having very low sugars in the hospital and his Lantus was adjusted  medications have been adjusted in the chart  he states that his fasting sugar this morning was 158, and yesterday or the day before it was 74   Kidney Disease, Chronic (Brief): The patient is being seen for follow-up of a hospitalization for chronic kidney disease  Congestive Heart Failure (Follow-Up): Symptoms: stable dyspnea on exertion, stable exercise intolerance and stable orthopnea  Hypertension (Follow-Up): The patient presents for follow-up of essential hypertension  The patient states he has been doing well with his blood pressure control since the last visit  Symptoms: denies impaired vision, stable dyspnea, denies chest pain, denies intermittent leg claudication and denies lower extremity edema  Medications: the patient is adherent with his medication regimen  Review of Systems  Complete-Male:   Constitutional: recent 1 lb weight loss, but no fever and no chills  ENT: no complaints of earache, no hearing loss, no nosebleeds, no nasal discharge, no sore throat, no hoarseness  Cardiovascular: No complaints of slow heart rate, no fast heart rate, no chest pain, no palpitations, no leg claudication, no lower extremity  Respiratory: as noted in HPI  Gastrointestinal: No complaints of abdominal pain, no constipation, no nausea or vomiting, no diarrhea or bloody stools  ROS Reviewed:   ROS reviewed        Active Problems    1  Abdominal bruit (785 9) (R09 89)   2  Acid reflux (530 81) (K21 9)   3  Ambulatory dysfunction (719 7) (R26 2)   4  Arteriosclerosis of coronary artery (414 00) (I25 10)   5  Asymptomatic carotid artery stenosis (433 10) (I65 29)   6  Benign essential hypertension (401 1) (I10)   7  CABG   8  CAD in native artery (414 01) (I25 10)   9  Cardiomyopathy (425 4) (I42 9)   10  Chronic kidney disease (585 9) (N18 9)   11  CKD (chronic kidney disease), stage III (585 3) (N18 3)   12  Congestive heart failure (428 0) (I50 9)   13  Coronary atherosclerosis of bypass graft (414 04) (I25 810)   14  Diabetes mellitus type 2 with neurological manifestations (250 60) (E11 49)   15  DMII (diabetes mellitus, type 2) (250 00) (E11 9)   16  Edema (782 3) (R60 9)   17  Gout (274 9) (M10 9)   18  History of benign prostatic hypertrophy (V13 89) (Z87 438)   19  History of coronary artery bypass graft (V45 81) (Z95 1)   20  Hyperlipidemia (272 4) (E78 5)   21  Hypertension (401 9) (I10)   22  Lower back pain (724 2) (M54 5)   23  Lumbar stenosis with neurogenic claudication (724 03) (M48 06)   24  Parkinson's disease (332 0) (G20)   25  Parkinsons disease, secondary (332 1) (G21 9)   26  Paroxysmal ventricular tachycardia (427 1) (I47 2)   27  Peripheral vascular disease (443 9) (I73 9)   28  Polyneuropathy in diabetes (250 60,357 2) (E11 42)   29  PVD (peripheral vascular disease) (443 9) (I73 9)   30  Renal failure (586) (N19)   31  Secondary hyperparathyroidism, renal (588 81) (N25 81)   32  Shortness of breath (786 05) (R06 02)   33  Stage 4 chronic kidney disease (585 4) (N18 4)   34  Type 2 diabetes with stage 3 chronic kidney disease GFR 30-59 (250 40,585 3)    (D71 97,Z66 7)    Past Medical History    1  History of Allergic rhinitis (477 9) (J30 9)   2  History of Cardiovascular Symptoms (785 9)   3  History of Chronic Pulmonary Edema (514)   4  History of Fainting (780 2) (R55)   5   History of breast lump (V13 89) (H72 305)   6  History of chest pain (V13 89) (Z87 898)   7  History of chronic obstructive lung disease (V12 69) (Z87 09)   8  History of fatigue (V13 89) (Z87 898)   9  History of gastrointestinal hemorrhage (V12 79) (Z87 19)   10  History of hypotension (V12 59) (Z86 79)   11  History of Insomnia w/ sleep apnea (780 51) (G47 00,G47 30)   12  History of Malaise (780 79) (R53 81)   13  History of Neck pain (723 1) (M54 2)   14  Old myocardial infarction (412) (I25 2)   15  History of Poison ivy dermatitis (692 6) (L23 7)   16  History of Polyuria (788 42) (R35 8)   17  History of Prostate cancer screening (V76 44) (Z12 5)   18  History of Special screening examination for neoplasm of prostate (V76 44) (Z12 5)    Surgical History    1  History of Appendectomy   2  CABG   3  History of Cataract Surgery   4  History of Implantable Cardioverter-Defibrillator   5  History of Neuroplasty Decompression Median Nerve At Carpal Tunnel   6  History of Tonsillectomy  Surgical History Reviewed: The surgical history was reviewed and updated today  Family History  Mother    1  Family history of    2  Family history of diabetes mellitus (V18 0) (Z83 3)  Father    3  Family history of    4  Family history of cerebrovascular accident (V17 1) (Z82 3)  Sister    5  Family history of Healthy adult  Maternal Grandmother    6  FH: Parkinson's disease (V17 2) (Z82 0)  Family History    7  Family history of lung cancer (V16 1) (Z80 1)  Family History Reviewed: The family history was reviewed and updated today  Social History    · Always uses seat belt   · Daily caffeine consumption, 2-3 servings a day   · Denied: Drug use (305 90) (F19 90)   · Home Durable Medical Equipment (DME)   ·    · Never A Smoker   · Never Drank Alcohol  Social History Reviewed: The social history was reviewed and updated today  Current Meds   1   Allopurinol 100 MG Oral Tablet; TAKE ONE TABLET BY MOUTH ONCE DAILY; Therapy: 92ZQN8556 to (Evaluate:20Uhu1629)  Requested for: 90IXG9330; Last   Rx:80Vls8547 Ordered   2  Amiodarone HCl - 200 MG Oral Tablet; take 1 tablet by mouth every day; Therapy: 95OJQ3478 to (Trixie Crate)  Requested for: 91JTT6807; Last   Rx:14Nov2016 Ordered   3  Azilect 1 MG Oral Tablet; TAKE ONE TABLET BY MOUTH ONCE DAILY; Therapy: 61NIT1989 to (Evaluate:25Jun2017)  Requested for: 91Enh6214; Last   Rx:46Nyz0379; Status: ACTIVE - Transmit to Pharmacy - Awaiting Verification Ordered   4  BD Insulin Syringe Ultrafine 30G X 1/2" 0 3 ML Miscellaneous; USE AS DIRECTED; Therapy: 67FCE7059 to (Last Rx:07Jun2016)  Requested for: 07Jun2016 Ordered   5  BD Insulin Syringe Ultrafine 30G X 1/2" 1 ML Miscellaneous; Therapy: 59QCG6984 to (Evaluate:20Frl8312) Recorded   6  Calcitriol 0 5 MCG Oral Capsule; take 1 capsule daily  Requested for: 30Ivd3318; Last   Rx:01Qmg7024 Ordered   7  Carvedilol 25 MG Oral Tablet; take 1/2 tablet twice a day; Therapy: 40ZLW0949 to (Evaluate:50Urz6768)  Requested for: 17WUB5577; Last   Rx:61Ozy3398 Ordered   8  Colchicine 0 6 MG Oral Tablet; TAKE 1 TABLET DAILY AS DIRECTED; Therapy: (Recorded:44Dev3774) to Recorded   9  Crestor 20 MG Oral Tablet; TAKE 1 TABLET DAILY; Therapy: (Recorded:36Sfg8398) to Recorded   10  Ecotrin Low Strength 81 MG Oral Tablet Delayed Release; TAKE 1 TABLET EVERY    OTHER DAY Recorded   11  HumaLOG 100 UNIT/ML Subcutaneous Solution; inject 20 units subcutaneously before    meals; Therapy: 90KWL0089 to (Onel Age)  Requested for: 19DUX2913; Last    Rx:13Mar2017 Ordered   12  Isosorbide Mononitrate ER 60 MG Oral Tablet Extended Release 24 Hour; take ONE AND    A HALF tablet by mouth once daily; Therapy: 81Hee5412 to [phone])  Requested for: 04Apr2017; Last    Rx:04Apr2017; Status: ACTIVE - Transmit to [facility] Verification Ordered   13   Lantus 100 UNIT/ML Subcutaneous Solution; INJECT 45 UNITS IN  THE MORNING AND    45 UNITS AT NIGHT; Therapy: (Recorded:94Lum3718) to Recorded   14  Lantus 100 UNIT/ML Subcutaneous Solution; inject 60 units subcutaneously twice daily; Therapy: 65NAP2348 to (Renew:85Nvr7811)  Requested for: 75Zwz8181; Last    Rx:07Nov2016 Ordered   15  Lasix 80 MG Oral Tablet; Take 1 tablet daily; Therapy: (Recorded:50Rvu8988) to Recorded   16  Losartan Potassium 25 MG Oral Tablet; TAKE ONE TABLET BY MOUTH ONCE DAILY; Therapy: 44GXB3696 to (Evaluate:66Rti3605)  Requested for: 74GMN5551; Last    Rx:28Nov2016 Ordered   17  Pantoprazole Sodium 40 MG Oral Tablet Delayed Release; TAKE ONE TABLET BY    MOUTH ONCE DAILY; Therapy: 66Num1721 to (Evaluate:19Jan2018)  Requested for: 25RLU3630; Last    Rx:24Jan2017; Status: ACTIVE - Transmit to [facility] Ordered   18  Plavix 75 MG Oral Tablet; take 1 tablet every other day; Therapy: (Recorded:34Zfp7247) to Recorded   19  Rosuvastatin Calcium 20 MG Oral Tablet; TAKE ONE TABLET BY MOUTH ONCE DAILY; Therapy: 73Sve3337 to (Evaluate:26Apr2017)  Requested for: 94Oku4988; Last    Rx:25Wjl6696 Ordered   20  Tamsulosin HCl - 0 4 MG Oral Capsule; TAKE 2 CAPSULE Bedtime; Therapy: (Recorded:92Frg6323) to Recorded  Medication List Reviewed: The medication list was reviewed and updated today  Allergies    1  No Known Drug Allergies    2  No Known Environmental Allergies   3  No Known Food Allergies    Physical Exam    Constitutional   General appearance: No acute distress, well appearing and well nourished  Eyes   Conjunctiva and lids: No swelling, erythema, or discharge  Pupils and irises: Equal, round and reactive to light  Ears, Nose, Mouth, and Throat   Oropharynx: Normal with no erythema, edema, exudate or lesions  Pulmonary   Respiratory effort: No increased work of breathing or signs of respiratory distress      Auscultation of lungs: Abnormal   Auscultation of the lungs revealed bibasilar rales/crackles and faint  Cardiovascular   Auscultation of heart: Normal rate and rhythm, normal S1 and S2, without murmurs  Examination of extremities for edema and/or varicosities: Normal     Abdomen   Abdomen: Non-tender, no masses  Diabetic Foot Screen: Abnormal     Socks and shoes removed, the Right Foot: the foot was normal, no swelling, no erythema  Diminished tactile sensation with monofilament testing throughout the right foot  Socks and shoes removed, Left Foot: the foot was normal, no swelling, no erythema  Diminished tactile sensation with monofilament testing throughout the left foot  Assign Risk Category: 2: Loss of protective sensation with or without weakness, deformity, callus, pre-ulcer, or history of ulceration  High risk  Provider Comments  Provider Comments:   LAbs and tests reviewed      Health Management  DMII (diabetes mellitus, type 2)   *VB - Foot Exam; every 1 year; Last 20Apr2017; Next Due: 20Apr2018; Active    Future Appointments    Date/Time Provider Specialty Site   06/01/2017 08:45 AM VIANCA Ramirez  Cardiology [facility]   06/09/2017 09:30 AM Cardiology[facility]   06/05/2017 09:15 AM VIANCA Salinas  Nephrology  [address] [place] [address] OF [facility]   06/27/2017 09:40 AM Brittany Hawk MD Neurology [facility]     Signatures   Electronically signed by : SUE Braun;  Apr 20 2017 11:53AM EST                       (Author)    Electronically signed by : VIANCA Fan ; Apr 20 2017  6:10PM EST

## 2018-01-16 NOTE — MISCELLANEOUS
Message   Recorded as Task   Date: 04/05/2016 09:25 AM, Created By: Ifrah Guzman   Task Name: Miscellaneous   Assigned To: Claudy Sung   Regarding Patient: Qing Klein, Status: In Progress   Comment:    Dimitri Teran - 05 Apr 2016 9:25 AM     TASK CREATED  Labs reviewed - creatinine elevated, noted very low PTH, urinalysis bland  Had a Renal US ordered by Cardiology couple of months ago that was unremarkable except enlarged prostate  Please contact patient and tell him to STOP Calcitriol for now  Will discuss further in upcoming office visit in 2 days  Maxine Walker - 05 Apr 2016 9:32 AM     TASK IN PROGRESS   Phyllis Palacios - 05 Apr 2016 9:33 AM     TASK EDITED  L/M for patient to return call       Patient is aware he needs to stop Calcitriol and will see the doctor in the office tomorrow  AG      Active Problems    1  Abdominal bruit (785 9) (R09 89)   2  Ambulatory dysfunction (719 7) (R26 2)   3  Arteriosclerosis of coronary artery (414 00) (I25 10)   4  Asymptomatic carotid artery stenosis (433 10) (I65 29)   5  Benign essential hypertension (401 1) (I10)   6  CABG   7  CAD in native artery (414 01) (I25 10)   8  Cardiomyopathy (425 4) (I42 9)   9  Chronic kidney disease (585 9) (N18 9)   10  CKD (chronic kidney disease), stage III (585 3) (N18 3)   11  Congestive heart failure (428 0) (I50 9)   12  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 (414 04)   13  Diabetes mellitus type 2 with neurological manifestations (250 60) (E11 49)   14  DMII (diabetes mellitus, type 2) (250 00) (E11 9)   15  Edema (782 3) (R60 9)   16  Gout (274 9) (M10 9)   17  History of coronary artery bypass graft (V45 81) (Z95 1)   18  Hyperlipidemia (272 4) (E78 5)   19  Hypertension (401 9) (I10)   20  Lower back pain (724 2) (M54 5)   21  Parkinson's disease (332 0) (Christiana Croswell)   22  Peripheral vascular disease (443 9) (I73 9)   23  Polyneuropathy in diabetes (250 60,357 2) (E11 42)   24   Secondary hyperparathyroidism, renal (588 81) (N25 81)   25  Shortness of breath (786 05) (R06 02)   26  Type 2 diabetes with stage 3 chronic kidney disease GFR 30-59 (250 40,585 3)    (E11 22,N18 3)    Current Meds   1  Allopurinol 100 MG Oral Tablet; TAKE ONE TABLET BY MOUTH ONCE DAILY; Therapy: 57DQO3092 to (Negrito Justin)  Requested for: 27KMG5195; Last   Rx:08Feb2016 Ordered   2  AmLODIPine Besylate 5 MG Oral Tablet; TAKE 1 TABLET DAILY  Requested for:   34GCQ8363; Last Rx:30Nov2015 Ordered   3  Atorvastatin Calcium 80 MG Oral Tablet; Take 1 tablet daily; Last Rx:23Apr2015 Ordered   4  Azilect 1 MG Oral Tablet; TAKE ONE TABLET BY MOUTH ONCE DAILY; Therapy: 23DYJ5278 to (Evaluate:01Oct2016); Last Rx:04Apr2016 Ordered   5  BD Insulin Syringe Ultrafine 30G X 1/2" 0 3 ML Miscellaneous; USE AS DIRECTED; Therapy: 68SQZ4227 to (Last Rx:15Apr2015)  Requested for: 15Apr2015 Ordered   6  BD Insulin Syringe Ultrafine 30G X 1/2" 1 ML Miscellaneous; Therapy: 02TWI8233 to (Evaluate:07Feb2015) Recorded   7  Calcitriol 0 5 MCG Oral Capsule; TAKE 2 CAPSULE Daily; Therapy: 89HAB3880 to (Evaluate:04Jun2016); Last Rx:74Eex8083 Ordered   8  Carvedilol 12 5 MG Oral Tablet; take one tablet by mouth twice daily; Therapy: 00YXW2221 to (Evaluate:26Jan2017)  Requested for: 62HVH3209; Last   Rx:01Feb2016 Ordered   9  Clopidogrel Bisulfate 75 MG Oral Tablet; TAKE ONE TABLET BY MOUTH ONCE DAILY; Therapy: 68VNN1911 to (Evaluate:09Aug2015)  Requested for: 64MCG2095; Last   Rx:10Feb2015 Ordered   10  Colcrys 0 6 MG Oral Tablet; TAKE ONE TABLET BY MOUTH EVERY DAY  AS    DIRECTED; Therapy: 74LVG8109 to (Evaluate:05Vht8217)  Requested for: 30AGU2898; Last    Rx:16Nov2015 Ordered   11  Ecotrin Low Strength 81 MG Oral Tablet Delayed Release; TAKE 1 TABLET EVERY    OTHER DAY Recorded   12  Furosemide 80 MG Oral Tablet; take one tablet by mouth twice daily; Therapy: 01Apr2014 to (Rowan Ibarra)  Requested for: 82SYN4186;  Last Rx:23Nov2015 Ordered   13  HumaLOG 100 UNIT/ML Subcutaneous Solution; INJECT 20 UNIT Before meals; Therapy: 05CIU7680 to (Renew:61Phj1302)  Requested for: 84YAW5515; Last    Rx:30Nov2015 Ordered   14  Isosorbide Mononitrate ER 60 MG Oral Tablet Extended Release 24 Hour; TAKE ONE    TABLET BY MOUTH ONCE DAILY; Therapy: 92Isy9984 to (Carole Cuevas)  Requested for: 87FLC3163; Last    Rx:27Jan2016 Ordered   15  Lantus 100 UNIT/ML Subcutaneous Solution; INJECT 60 UNIT Twice daily; Therapy: 56PDD0556 to (Janelle Contreras)  Requested for: 51KML2918; Last    Rx:30Nov2015 Ordered   16  Losartan Potassium 25 MG Oral Tablet (Cozaar); TAKE ONE TABLET BY MOUTH ONCE    DAILY; Therapy: 61SCA1517 to (Evaluate:27Ewe9282)  Requested for: 87CDE7099; Last    Rx:16Nov2015 Ordered   17  Nasonex 50 MCG/ACT Nasal Suspension; USE 2 SPRAYS IN EACH NOSTRIL ONCE    DAILY; Therapy: 34JZF7129 to (Last Rx:13Nov2014)  Requested for: 04QOZ0679 Ordered   18  Nitroglycerin 0 4 MG SUBL; DISSOLVE 1 TABLET UNDER THE TONGUE AS NEEDED    FOR CHEST PAIN Recorded   19  Pantoprazole Sodium 40 MG Oral Tablet Delayed Release; TAKE ONE TABLET BY    MOUTH ONCE DAILY; Therapy: 73DHH5787 to (Evaluate:37Lac6542)  Requested for: 53Tuo6261; Last    Rx:50Uki8783 Ordered   20  Tamsulosin HCl - 0 4 MG Oral Capsule; TAKE 2 CAPSULE Bedtime; Therapy: (Recorded:14Oct2015) to Recorded    Allergies    1   No Known Drug Allergies    Signatures   Electronically signed by : VIANCA Loco ; Apr 6 2016  2:47PM EST

## 2018-01-16 NOTE — MISCELLANEOUS
Assessment    1  Diabetes mellitus type 2 with neurological manifestations (250 60) (E11 49)   2  CKD (chronic kidney disease), stage III (585 3) (N18 3)   3  Hypertension (401 9) (I10)   4  Hyperlipidemia (272 4) (E78 5)   5  Arteriosclerosis of coronary artery (414 00) (I25 10)   6  Congestive heart failure (428 0) (I50 9)   7  Parkinson's disease (332 0) (G20)   8  Ambulatory dysfunction (719 7) (R26 2)    Plan  CKD (chronic kidney disease), stage III    · (1) CBC/PLT/DIFF; Status:Active; Requested [id number];    Perform:[facility]; XHH:25EHG6920;TMYLHGA; For:CKD (chronic kidney disease), stage III; Ordered By:Malissa Robin;   · (1) COMPREHENSIVE METABOLIC PANEL; Status:Active; Requested VYK:68AXN7338;    Perform:[facility]; KWI:83OHH2988;XXPEGYT; For:CKD (chronic kidney disease), stage III; Ordered By:Malissa Robin;  Diabetes mellitus type 2 with neurological manifestations    · (1) HEMOGLOBIN A1C; Status:Active; Requested EEY:92FUZ8332;    Perform:[facility]; UCN:85IIC9038;MKYATXW; For:Diabetes mellitus type 2 with neurological manifestations; Ordered By:Malissa Robin; Hyperlipidemia    · (1) LIPID PANEL, FASTING; Status:Active; Requested TNP:81ALH8828;    Perform:[facility]; RNA:60KMS0757;AUPAQGK;  [facility]; Ordered By:Malissa Robin;   · (1) TSH; Status:Active; Requested JJI:66OSA0335;    Perform:[facility]; MMI:25XYI7042;USNQUNC;  [place] [facility]; Ordered By:Malissa Robin; Discussion/Summary  Discussion Summary:   Patient was admitted to the hospital on  for 1 day with symptoms of hypoglycemia  The dose of his Lantus was decreased to 40 units twice a day and Humalog to 15 units before meals  Now his blood sugars are running between 150 and 200  He was told to increase the Lantus to 45 units twice a day   He will give me a call in a week to let me know what his fasting blood glucose levels are so that I can adjust the dosage of Lantus  He was told to continue the rest of his medications  Chronic kidney disease-his creatinine is up to 2 7  He is following up with nephrology  His potassium is 2 4 and his last blood work but I do not want to replace potassium secondary to the kidney disease  He will come back in August with blood work results  Counseling Documentation With Imm: The patient was counseled regarding diagnostic results, instructions for management, risk factor reductions, risks and benefits of treatment options, importance of compliance with treatment  Medication SE Review and Pt Understands Tx: Possible side effects of new medications were reviewed with the patient/guardian today  The treatment plan was reviewed with the patient/guardian  The patient/guardian understands and agrees with the treatment plan      Chief Complaint  Chief Complaint Free Text Note Form: hospital follow up      History of Present Illness  TCM Communication [facility]: The patient is being contacted for follow-up after hospitalization and NEEDS APPT  Hospital course was discussed with the inpatient physician and records were reviewed  He was hospitalized at [facility]  The date of discharge: 4/20/16  Diagnosis: HYPOGLYCEMIA  He was discharged to home  Medications reviewed and updated today  He did not schedule a follow up appointment  Follow-up appointments with other specialists: NEEDS CARD AND NEPHRO F/U  Symptoms: weakness  Counseling was provided to the patient  FEELS BETTER  Topics counseled included diagnostic results, instructions for management, risk factor reductions, prognosis and importance of compliance with treatment  Communication performed and completed by STEPHANIE CONTRERAS   HPI: Patient was admitted to the hospital on April 19 for 1 day with symptoms of hypoglycemia  The dose of his Lantus was decreased to 40 units twice a day and Humalog to 15 units before meals  Now his blood sugars are running between 150 and 200  Review of Systems  Complete-Male:   Constitutional: as noted in HPI  Eyes: No complaints of eye pain, no red eyes, no discharge from eyes, no itchy eyes  ENT: no complaints of earache, no hearing loss, no nosebleeds, no nasal discharge, no sore throat, no hoarseness  Cardiovascular: No complaints of slow heart rate, no fast heart rate, no chest pain, no palpitations, no leg claudication, no lower extremity  Respiratory: No complaints of shortness of breath, no wheezing, no cough, no SOB on exertion, no orthopnea or PND  Gastrointestinal: No complaints of abdominal pain, no constipation, no nausea or vomiting, no diarrhea or bloody stools  Genitourinary: No complaints of dysuria, no incontinence, no hesitancy, no nocturia, no genital lesion, no testicular pain  Musculoskeletal: No complaints of arthralgia, no myalgias, no joint swelling or stiffness, no limb pain or swelling  Integumentary: No complaints of skin rash or skin lesions, no itching, no skin wound, no dry skin  Neurological: No compliants of headache, no confusion, no convulsions, no numbness or tingling, no dizziness or fainting, no limb weakness, no difficulty walking  Psychiatric: Is not suicidal, no sleep disturbances, no anxiety or depression, no change in personality, no emotional problems  Endocrine: No complaints of proptosis, no hot flashes, no muscle weakness, no erectile dysfunction, no deepening of the voice, no feelings of weakness  Hematologic/Lymphatic: No complaints of swollen glands, no swollen glands in the neck, does not bleed easily, no easy bruising  Preventive Quality 65 Older:   Preventive Quality 65 and Older: The patient currently has no urinary incontinence symptoms  Active Problems    1  Abdominal bruit (785 9) (R09 89)   2  Acid reflux (530 81) (K21 9)   3  Ambulatory dysfunction (719 7) (R26 2)   4  Arteriosclerosis of coronary artery (414 00) (I25 10)   5   Asymptomatic carotid artery stenosis (433 10) (I65 29)   6  Benign essential hypertension (401 1) (I10)   7  CABG   8  CAD in native artery (414 01) (I25 10)   9  Cardiomyopathy (425 4) (I42 9)   10  Chronic kidney disease (585 9) (N18 9)   11  CKD (chronic kidney disease), stage III (585 3) (N18 3)   12  Congestive heart failure (428 0) (I50 9)   13  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 (414 04)   14  Diabetes mellitus type 2 with neurological manifestations (250 60) (E11 49)   15  DMII (diabetes mellitus, type 2) (250 00) (E11 9)   16  Edema (782 3) (R60 9)   17  Gout (274 9) (M10 9)   18  History of benign prostatic hypertrophy (V13 89) (Z87 438)   19  History of coronary artery bypass graft (V45 81) (Z95 1)   20  Hyperlipidemia (272 4) (E78 5)   21  Hypertension (401 9) (I10)   22  Lower back pain (724 2) (M54 5)   23  Parkinson's disease (332 0) (G20)   24  Parkinsons disease, secondary (332 1) (G21 9)   25  Peripheral vascular disease (443 9) (I73 9)   26  Polyneuropathy in diabetes (250 60,357 2) (E11 42)   27  Renal failure (586) (N19)   28  Secondary hyperparathyroidism, renal (588 81) (N25 81)   29  Shortness of breath (786 05) (R06 02)   30  Type 2 diabetes with stage 3 chronic kidney disease GFR 30-59 (250 40,585 3)    (B74 16,H01 6)    Past Medical History    1  History of Allergic rhinitis (477 9) (J30 9)   2  History of Cardiovascular Symptoms (785 9)   3  History of Chronic Pulmonary Edema (514)   4  History of Fainting (780 2) (R55)   5  History of breast lump (V13 89) (Z87 898)   6  History of chest pain (V13 89) (Z87 898)   7  History of chronic obstructive lung disease (V12 69) (Z87 09)   8  History of fatigue (V13 89) (Z87 898)   9  History of gastrointestinal hemorrhage (V12 79) (Z87 19)   10  History of hypotension (V12 59) (Z86 79)   11  History of Insomnia w/ sleep apnea (780 51) (G47 00,G47 30)   12  History of Malaise (780 79) (R53 81)   13  History of Neck pain (723 1) (M54 2)   14   Old myocardial infarction (412) (I25 2)   15  History of Poison ivy dermatitis (692 6) (L23 7)   16  History of Polyuria (788 42) (R35 8)   17  History of Prostate cancer screening (V76 44) (Z12 5)   18  History of Special screening examination for neoplasm of prostate (V76 44) (Z12 5)    Surgical History    1  History of Appendectomy   2  CABG   3  History of Cataract Surgery   4  History of Neuroplasty Decompression Median Nerve At Carpal Tunnel   5  History of Tonsillectomy  Surgical History Reviewed: The surgical history was reviewed and updated today  Family History    1  Family history of diabetes mellitus (V18 0) (Z83 3)    2  Family history of cerebrovascular accident (V17 1) (Z82 3)    3  FH: Parkinson's disease (V17 2) (Z82 0)    4  Family history of lung cancer (V16 1) (Z80 1)  Family History Reviewed: The family history was reviewed and updated today  Social History    · Always uses seat belt   · Daily caffeine consumption, 2-3 servings a day   · Denied: Drug use (305 90) (F19 90)   · Home Durable Medical Equipment (DME)   ·    · Never A Smoker   · Never Drank Alcohol  Social History Reviewed: The social history was reviewed and updated today  The social history was reviewed and is unchanged  Current Meds   1  Allopurinol 100 MG Oral Tablet; TAKE ONE TABLET BY MOUTH ONCE DAILY; Therapy: 75HKA4349 to (Ruthy Kumar)  Requested for: 87CXU8401; Last   Rx:68Ejs1347 Ordered   2  AmLODIPine Besylate 5 MG Oral Tablet; TAKE 1 TABLET DAILY  Requested for:   60UQI2449; Last Rx:30Nov2015 Ordered   3  Atorvastatin Calcium 80 MG Oral Tablet; Take 1 tablet daily; Last Rx:23Apr2015 Ordered   4  Azilect 1 MG Oral Tablet; TAKE ONE TABLET BY MOUTH ONCE DAILY; Therapy: 89FYB1217 to (Evaluate:85Xey4705); Last Rx:08Ogr3321 Ordered   5  BD Insulin Syringe Ultrafine 30G X 1/2" 0 3 ML Miscellaneous; USE AS DIRECTED; Therapy: 38VSY7399 to (Last Rx:15Apr2015)  Requested for: 72Ocw4839 Ordered   6   BD Insulin Syringe Ultrafine 30G X 1/2" 1 ML Miscellaneous; Therapy: 40JCV6425 to (Evaluate:20Jjx0503) Recorded   7  Calcitriol 0 5 MCG Oral Capsule; TAKE 1 CAPSULE Every morning; Therapy: (Recorded:66Fuw9209) to Recorded   8  Carvedilol 12 5 MG Oral Tablet; Take 1 tablet daily; Therapy: 27AUU3124 to (Evaluate:03Imm2983)  Requested for: 85Vkb4049 Recorded   9  Clopidogrel Bisulfate 75 MG Oral Tablet; TAKE 1 TABLET EVERY OTHER DAY; Therapy: 31MBI2290 to  Requested for: 28Jnw8507 Recorded   10  Colcrys 0 6 MG Oral Tablet; TAKE ONE TABLET BY MOUTH EVERY DAY  AS DIRECTED; Therapy: 11BIV9663 to (Evaluate:61Wvt9858)  Requested for: 42GRZ5373; Last    Rx:16Nov2015 Ordered   11  Ecotrin Low Strength 81 MG Oral Tablet Delayed Release; TAKE 1 TABLET EVERY    OTHER DAY Recorded   12  Furosemide 80 MG Oral Tablet; take one tablet by mouth twice daily; Therapy: 46Adx5029 to (Evaluate:34Pxs2027)  Requested for: 66Xga4688; Last    Rx:18Apr2016 Ordered   13  HumaLOG 100 UNIT/ML Subcutaneous Solution; INJECT 15 UNIT Before meals; Therapy: 58NFE1549 to (Renew:26Oct2016)  Requested for: 27QNO1216 Recorded   14  Isosorbide Mononitrate ER 60 MG Oral Tablet Extended Release 24 Hour; TAKE ONE    TABLET BY MOUTH ONCE DAILY; Therapy: 55Hwq1645 to (Isaias Pollard)  Requested for: 85XFK7896; Last    Rx:27Jan2016 Ordered   15  Lantus 100 UNIT/ML Subcutaneous Solution; INJECT 45 UNIT Twice daily; Therapy: 99JMU5594 to (Renew:19Nov2016)  Requested for: 31GRN9460 Recorded   16  Losartan Potassium 25 MG Oral Tablet; TAKE ONE TABLET BY MOUTH ONCE DAILY; Therapy: 11CSH7771 to (Evaluate:10Nov2016)  Requested for: 99SMD7255; Last    Rx:16Nov2015 Ordered   17  Nitroglycerin 0 4 MG SUBL; DISSOLVE 1 TABLET UNDER THE TONGUE AS NEEDED    FOR CHEST PAIN Recorded   18  Pantoprazole Sodium 40 MG Oral Tablet Delayed Release; TAKE ONE TABLET BY    MOUTH ONCE DAILY;     Therapy: 35TZT9646 to (Evaluate:90Txz9528)  Requested for: 50DTO2060; Last    Rx:25Pkf7126 Ordered   19  Tamsulosin HCl - 0 4 MG Oral Capsule; TAKE 2 CAPSULE Bedtime; Therapy: (Recorded:95Pbx5614) to Recorded  Medication List Reviewed: The medication list was reviewed and updated today  Allergies    1  No Known Drug Allergies    Vitals  Signs [Data Includes: Current Encounter]   Recorded: 07GBR7881 07:59AM   Heart Rate: 96  Systolic: 440  Diastolic: 72  Height: 5 ft 4 in  Weight: 189 lb   BMI Calculated: 32 44  BSA Calculated: 1 92    Physical Exam    Constitutional   General appearance: Abnormal   appears tired  Eyes   Conjunctiva and lids: No swelling, erythema, or discharge  Pupils and irises: Equal, round and reactive to light  Ears, Nose, Mouth, and Throat   External inspection of ears and nose: Normal     Oropharynx: Normal with no erythema, edema, exudate or lesions  Pulmonary   Respiratory effort: No increased work of breathing or signs of respiratory distress  Auscultation of lungs: Clear to auscultation  Cardiovascular   Palpation of heart: Normal PMI, no thrills  Auscultation of heart: Normal rate and rhythm, normal S1 and S2, without murmurs  Examination of extremities for edema and/or varicosities: Abnormal   bilateral ankle 2+ pitting edema  Abdomen   Abdomen: Non-tender, no masses  Lymphatic   Palpation of lymph nodes in neck: No lymphadenopathy  Musculoskeletal   Gait and station: Normal     Skin   Skin and subcutaneous tissue: Normal without rashes or lesions  Neurologic   Reflexes: 2+ and symmetric  Sensation: No sensory loss      Psychiatric   Orientation to person, place and time: Normal     Mood and affect: Normal        Results/Data  Encounter Results   *VB-Urinary Incontinence Screen (Dx V81 6 Screen for UI) 16MFF4259 07:51AM Isabela Mann     Test Name Result Flag Reference   Urinary Incontinence Assessment [id number]         Health Management  DMII (diabetes mellitus, type 2)   *VB-Foot Exam; every 1 year; Last 52Zid9864; Next Due: 09Uzs9662; Active    Future Appointments    Date/Time Provider Specialty Site   05/04/2016 10:30 AM VIANCA Connell  Cardiology [facility]ARDIOLOG[facility]   10/17/2016 08:30 AM Tamia Laura MD Neurology [address]   08/15/2016 10:00 AM [facility]     Signatures   Electronically signed by : VIANCA Robbins ; May  3 2016  9:07AM EST                       (Author)

## 2018-01-17 NOTE — PROGRESS NOTES
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; ORS:52ZLN5189;PEQGKXT; For:CKD (chronic kidney disease), stage III; Ordered By:Dimitri Teran;   · (1) RENAL FUNCTION PANEL; Status:Active; Requested for:01Oct2016;    Perform:[facility]; QEC:33WTS0075;WZSXQCS; For:CKD (chronic kidney disease), stage III; Ordered By:Dimitri Teran;   · (1) URINALYSIS (will reflex a microscopy if leukocytes, occult blood, protein or nitrites are  not within normal limits); Status:Active; Requested for:18Apr2016;    Perform:[facility]; Due:18Apr2017;Ordered; For:CKD (chronic kidney disease), stage III; Ordered By:Dimitri Teran;   · (1) URINE PROTEIN CREATININE RATIO; Status:Active; Requested for:01Oct2016;    Perform:[facility]; GCO:17JKD9186;KYYMCJC; For:CKD (chronic kidney disease), stage III; Ordered By:Dimitri Teran;   · Follow-up visit in 6 months Evaluation and Treatment  Follow-up  Status: Hold For -  Scheduling  Requested for: 87NUT0718   Ordered; For: CKD (chronic kidney disease), stage III; Ordered By: Kemar Marroquin Performed:  Due: 18YRP2738   · Diabetes & Kidney Disease handout given today; Status:Complete;   Done: 00VVA9543   Ordered; For:CKD (chronic kidney disease), stage III; Ordered By:Dimitri Teran;   · Do not take aspirin or anti-inflammatory medicines  ; Status:Complete;   Done:  00ZGD1023   Ordered; For:CKD (chronic kidney disease), stage III; Ordered By:Dimitri Teran;   · Restrict your sodium (salt) intake to 2 grams per day ; Status:Complete;   Done:  29KBQ3016   Ordered; For:CKD (chronic kidney disease), stage III; Ordered By:Angy Teran;   · Understanding CKD handout given today; Status:Complete;   Done: 17YVG8066   Ordered; For:CKD (chronic kidney disease), stage III; Ordered By:Dimitri Teran;  PMH: Allergic rhinitis    · Nasonex 50 MCG/ACT Nasal Suspension (Mometasone Furoate)   Rx By: Sofy Nicole; Dispense: 0 Days ; #:13 GM; Refill: 1; For: PMH: Allergic rhinitis; SONIA = N; Verified Transmission to [facility] # 166; Last Updated By: Sudhakar Au; 4/7/2016 12:59:38 PM  Secondary hyperparathyroidism, renal    · Calcitriol 0 5 MCG Oral Capsule   Rx By: Kelly Sorto; Dispense: 30 Days ; #:60 Capsule; Refill: 5; For: Secondary hyperparathyroidism, renal; SONIA = N; Sent To: [facility] # 80; Last Updated By: Sudhakar Au; 4/7/2016 12:59:31 PM    Discussion/Summary    67years old gentleman with CKD stage 3B (previous creatinine low 2's), in the setting of long term history of uncontrolled IDDM with retinopathy, HTN, cardiomyopathy, CAD s/p CABG presents for CKD follow up     1  Worsening CKD stage 3 - no clear culprit   CKD likely secondary to diabetes nephropathy, HTN nephrosclerosis and atherosclerotic disease  REPEAT LABS IN 2 WEEKS, IF BETTER FOLLOW UP IN 6 MONTHS  DO NOT TAKE OTC NSAIDs      2  Insulin dependent diabetes with retinopathy and possible nephropathy -   Goal to have HgbA1c less than 7%  Needs better blood sugar control, last HgbA1c 8 7%    3  Hypertension - blood pressure is OK  Follow low salt diet (less than 2 gr daily)    4  CAD s/p CABG, CM, CHF - no significant lower extremity edema  Follows with Dr Stephen Porter again states drinks "a lot" of water"    ADVISE TO LIMIT FLUID INTAKE TO NO MORE 70 ounces daily    5  Hemoglobin normal     6  Mineral bone disease - PTH too low - Calcitriol on hold  Follow repeat labs in 6 months    7  Morbid obesity - advise to lose weight    8  BPH - follows with urologist (Dr Misty Hobbs), on Flomax      FOLLOW UP IN 6 MONTHS AS LONG AS REPEAT LABS STABLE  The patient, patient's family was counseled regarding diagnostic results, instructions for management, risk factor reductions, risks and benefits of treatment options, importance of compliance with treatment  He has no barriers to learning  Indication for Services: CKD Stage 3  CKD Teaching includes hypertension management, Avoid nephrotoxic medication, sodium restriction and fluid management  Current Patient Status: no bone mineral disease, no anemia and Renal functioning is worsening  Discussed with the patient      Reason For Visit  CKD follow up      History of Present Illness  67years old gentleman with CKD stage 3B (previous Cr 18 to 2 0 since 2014), uncontrolled IDDM (last HgbA1c 8 7% March 2016, DM for over 30 years, diabetes retinopathy, HTN for more than 30 years, hyperlipidemia, CAD s/p CABG 2012, BPH, gout, initially seen for evaluation of worsening renal function Sept 2015, noted creatinine at that time increased to 2 4, repeat labs showed improvement to 2 1  Today returns for follow up  Since last visit, no new events or hospitalizations  Saw recently Dr Misty Hobbs (urologist) last week and was told his prostate was enlarged but no changes on his meds were made  Today in general is feeling OK  Denies any CP or SOB  No abdominal pain, no N/V/D/C  Denies dysuria or hematuria, has nocturia 3-4 times per night, and notices some urinary urgency sometimes  No leg swollen  Weight decreased 5 lbs since last visit  Appetite is OK      NO family history of kidney disease or ESRD  Positive family hx of DM and Cancer  NO history of kidney stones  NO NSAIDs         Review of Systems    Constitutional: recent 5 lbs since last visit lb weight loss, but no fever, no chills, no anorexia, no fatigue and no recent weight gain  Integumentary: no rashes  Gastrointestinal: no abdominal pain, no constipation, no nausea, no diarrhea and no vomiting  Respiratory: no shortness of breath, no cough and not coughing up sputum  Cardiovascular: no orthopnea, no PND, no chest pain, no palpitations and no lower extremity edema  Musculoskeletal: no joint pain and no joint swelling  Neurological: no headache, no lightheadedness and no dizziness  Genitourinary: nocturia and urinary frequency, but no dysuria, no hematuria and no incomplete emptying of bladder  Eyes: no eyesight problems and no dryness of the eyes  ENT: no hearing loss and no nasal discharge  Past Medical History    The active problems and past medical history were reviewed and updated today  Surgical History    The surgical history was reviewed and updated today  Family History    The family history was reviewed and updated today  Social History  The social history was reviewed and updated today  Current Meds   1  Allopurinol 100 MG Oral Tablet; TAKE ONE TABLET BY MOUTH ONCE DAILY; Therapy: 91TOD9896 to (Babs Parrish)  Requested for: 62DXI1926; Last   Rx:06Uhv1384 Ordered   2  AmLODIPine Besylate 5 MG Oral Tablet; TAKE 1 TABLET DAILY  Requested for:   81ROT9839; Last Rx:30Nov2015 Ordered   3  Atorvastatin Calcium 80 MG Oral Tablet; Take 1 tablet daily; Last Rx:23Apr2015 Ordered   4  Azilect 1 MG Oral Tablet; TAKE ONE TABLET BY MOUTH ONCE DAILY; Therapy: 89SOA3909 to (Evaluate:01Oct2016); Last Rx:10Ghc1306 Ordered   5  BD Insulin Syringe Ultrafine 30G X 1/2" 0 3 ML Miscellaneous; USE AS DIRECTED; Therapy: 23NEC4694 to (Last Rx:15Apr2015)  Requested for: 29Qqc7654 Ordered   6  BD Insulin Syringe Ultrafine 30G X 1/2" 1 ML Miscellaneous; Therapy: 13TWE9393 to (Evaluate:07Feb2015) Recorded   7   Calcitriol 0 5 MCG Oral Capsule; TAKE 2 CAPSULE Daily; Therapy: 09JOT7330 to (Evaluate:04Jun2016); Last Rx:68Yjt6657 Ordered   8  Carvedilol 12 5 MG Oral Tablet; Take 1 tablet daily; Therapy: 95FHN4953 to (Evaluate:02Apr2017)  Requested for: 07Apr2016 Recorded   9  Clopidogrel Bisulfate 75 MG Oral Tablet; TAKE 1 TABLET EVERY OTHER DAY; Therapy: 69NXV8389 to  Requested for: 07Apr2016 Recorded   10  Colcrys 0 6 MG Oral Tablet; TAKE ONE TABLET BY MOUTH EVERY DAY  AS DIRECTED; Therapy: 16EIE1401 to (Evaluate:12Aug2016)  Requested for: 20DVT4527; Last    Rx:16Nov2015 Ordered   11  Ecotrin Low Strength 81 MG Oral Tablet Delayed Release; TAKE 1 TABLET EVERY    OTHER DAY Recorded   12  Furosemide 80 MG Oral Tablet; take one tablet by mouth twice daily; Therapy: 01Apr2014 to (Faina Anglin)  Requested for: 58CSB8168; Last    Rx:23Nov2015 Ordered   13  HumaLOG 100 UNIT/ML Subcutaneous Solution; INJECT 20 UNIT Before meals; Therapy: 12MSW8937 to (Renew:59Yqn5580)  Requested for: 28CQN3550; Last    Rx:30Nov2015 Ordered   14  Isosorbide Mononitrate ER 60 MG Oral Tablet Extended Release 24 Hour; TAKE ONE    TABLET BY MOUTH ONCE DAILY; Therapy: 58Ntq2530 to (Ellyn Cool)  Requested for: 89XPA5101; Last    Rx:27Jan2016 Ordered   15  Lantus 100 UNIT/ML Subcutaneous Solution; INJECT 60 UNIT Twice daily; Therapy: 63WHC0135 to (Carter French)  Requested for: 19QZM5691; Last    Rx:30Nov2015 Ordered   16  Losartan Potassium 25 MG Oral Tablet; TAKE ONE TABLET BY MOUTH ONCE DAILY; Therapy: 98CAE8789 to (Evaluate:10Nov2016)  Requested for: 60MAP5619; Last    Rx:16Nov2015 Ordered   17  Nasonex 50 MCG/ACT Nasal Suspension; USE 2 SPRAYS IN EACH NOSTRIL ONCE    DAILY; Therapy: 84CEM8331 to (Last Rx:13Nov2014)  Requested for: 86HFN8609 Ordered   18  Nitroglycerin 0 4 MG SUBL; DISSOLVE 1 TABLET UNDER THE TONGUE AS NEEDED    FOR CHEST PAIN Recorded   19   Pantoprazole Sodium 40 MG Oral Tablet Delayed Release; TAKE ONE TABLET BY    MOUTH ONCE DAILY; Therapy: 72NFM8952 to (Evaluate:87Lwt4213)  Requested for: 46Tmg9047; Last    Rx:92Kbd7514 Ordered   20  Tamsulosin HCl - 0 4 MG Oral Capsule; TAKE 2 CAPSULE Bedtime; Therapy: (Recorded:86Bgf6805) to Recorded    The medication list was reviewed and updated today  Allergies    1  No Known Drug Allergies    Vitals  Vital Signs [Data Includes: Current Encounter]    Recorded: 82ASX2672 01:20PM Recorded: 07Apr2016 12:58PM   Heart Rate 78    Systolic 018, LUE, Sitting    Diastolic 70, LUE, Sitting    Height  5 ft 4 in   Weight  188 lb 8 0 oz   BMI Calculated  32 36   BSA Calculated  1 92     Physical Exam    Constitutional: General appearance: Abnormal   appears healthy, obese and well hydrated  ENT: External ears and nose appear normal      Eyes: Anicteric sclerae  JVD:  No JVD present  Pulmonary:  Auscultation of lungs: Clear to auscultation  Cardiovascular: Auscultation of heart: Normal rate and rhythm, normal S1 and S2, without murmurs  Abdomen: Abnormal   The abdomen was obese  Bowel sounds were normal  The abdomen was soft and nontender  The abdomen was normal to percussion  Umbilical hernia  Extremities: No cyanosis, clubbing or edema  Rash: No rash present  Neurologic: Non Focal      Psychiatric: Orientation to person, place, and time: Normal   and Mood and affect: Normal     Back: No CVA tenderness        Results/Data  2 WEEKS Results   (1) CBC/PLT/DIFF 78OTZ4099 07:03AM [facility]     Test Name Result Flag Reference   WBC COUNT 6 22 Thousand/uL  4 31-10 16   RBC COUNT 5 21 Million/uL  3 88-5 62   HEMOGLOBIN 16 7 g/dL  12 0-17 0   HEMATOCRIT 47 8 %  36 5-49 3   MCV 92 fL  82-98   MCH 32 1 pg  26 8-34 3   MCHC 34 9 g/dL  31 4-37 4   RDW 13 2 %  11 6-15 1   MPV 11 2 fL  8 9-12 7   PLATELET COUNT 531 Thousands/uL  149-390   nRBC AUTOMATED 0 /100 WBCs     NEUTROPHILS RELATIVE PERCENT 55 %  43-75   LYMPHOCYTES RELATIVE PERCENT 28 %  14-44   MONOCYTES RELATIVE PERCENT 11 % 4-12   EOSINOPHILS RELATIVE PERCENT 5 %  0-6   BASOPHILS RELATIVE PERCENT 1 %  0-1   NEUTROPHILS ABSOLUTE COUNT 3 45 Thousands/µL  1 85-7 62   LYMPHOCYTES ABSOLUTE COUNT 1 73 Thousands/µL  0 60-4 47   MONOCYTES ABSOLUTE COUNT 0 67 Thousand/µL  0 17-1 22   EOSINOPHILS ABSOLUTE COUNT 0 33 Thousand/µL  0 00-0 61   BASOPHILS ABSOLUTE COUNT 0 03 Thousands/µL  0 00-0 10     (1) COMPREHENSIVE METABOLIC PANEL 34ATE4820 30:98ZE Tex Rani   National Kidney Disease Education Program recommendations are as follows:  GFR calculation is accurate only with a steady state creatinine  Chronic Kidney disease less than 60 ml/min/1 73 sq  meters  Kidney failure less than 15 ml/min/1 73 sq  meters  Test Name Result Flag Reference   GLUCOSE,RANDM 96 mg/dL     If the patient is fasting, the ADA then defines impaired fasting glucose as > 100 mg/dL and diabetes as > or equal to 123 mg/dL  SODIUM 144 mmol/L  136-145   POTASSIUM 3 8 mmol/L  3 5-5 3   CHLORIDE 103 mmol/L  100-108   CARBON DIOXIDE 32 mmol/L  21-32   ANION GAP (CALC) 9 mmol/L  4-13   BLOOD UREA NITROGEN 43 mg/dL H 5-25   CREATININE 2 71 mg/dL H 0 60-1 30   CALCIUM 9 7 mg/dL  8 3-10 1   BILI, TOTAL 0 56 mg/dL  0 20-1 00   ALK PHOSPHATAS 95 U/L     ALT (SGPT) 25 U/L  12-78   AST(SGOT) 16 U/L  5-45   ALBUMIN 3 6 g/dL  3 5-5 0   TOTAL PROTEIN 7 5 g/dL  6 4-8 2   eGFR Non-African American 23 0 ml/min/1 73sq m       (1) HEMOGLOBIN A1C 02FWR2842 07:03AM Malissa Robin   5 7-6 4% impaired fasting glucose  >=6 5% diagnosis of diabetes    Falsely low levels are seen in conditions linked to short RBC life span-  hemolytic anemia, and splenomegaly  Falsely elevated levels are seen in situations where there is an increased production of RBC- receipt of erythropoietin or blood transfusions  Adopted from ADA-Clinical Practice Recommendations     Test Name Result Flag Reference   HEMOGLOBIN A1C 8 7 % H 4 0-5 6   EST  AVG   GLUCOSE 203 mg/dl       (1) LIPID PANEL, FASTING 83DBO3083 07:03AM Michelle Sifuentes   Triglyceride:         Normal              <150 mg/dl       Borderline High    150-199 mg/dl       High               200-499 mg/dl       Very High          >499 mg/dl  Cholesterol:         Desirable        <200 mg/dl      Borderline High  200-239 mg/dl      High             >239 mg/dl  HDL Cholesterol:        High    >59 mg/dL      Low     <41 mg/dL     Test Name Result Flag Reference   CHOLESTEROL 236 mg/dL H    HDL,DIRECT 33 mg/dL L 40-60   LDL CHOLESTEROL CALCULATED 139 mg/dL H 0-100   TRIGLYCERIDES 319 mg/dL H <=150     (1) TSH 02DYW1998 07:03AM Malissa Robin   Patients undergoing fluorescein dye angiography may retain small amounts of fluorescein in the body for 48-72 hours post procedure  Samples containing fluorescein can produce falsely depressed TSH values  If the patient had this procedure,a specimen should be resubmitted post fluorescein clearance       Test Name Result Flag Reference   TSH 1 130 uIU/mL  0 358-3 740     (1) RENAL FUNCTION PANEL 34PNR0592 07:03AM eri[facility]oo     Test Name Result Flag Reference   PHOSPHORUS 3 8 mg/dL  2 3-4 1     (1) PTH N-TERMINAL (INTACT) 26HJO8383 07:03AM [facility]     Test Name Result Flag Reference   PARATHYROID HORMONE INTACT <5 5 pg/mL L 14 0-72 0     (1) URINALYSIS (will reflex a microscopy if leukocytes, occult blood, protein or nitrites are not within normal limits) 72HTA1703 07:03AM [facility]     Test Name Result Flag Reference   COLOR Yellow     CLARITY Clear     SPECIFIC GRAVITY UA 1 012  1 003-1 030   PH UA 6 5  4 5-8 0   LEUKOCYTE ESTERASE UA Negative  Negative   NITRITE UA Negative  Negative   PROTEIN UA Negative mg/dl  Negative   GLUCOSE UA Negative mg/dl  Negative   KETONES UA Negative mg/dl  Negative   UROBILINOGEN UA 0 2 E U /dl  0 2, 1 0 E U /dl   BILIRUBIN UA Negative  Negative   BLOOD UA Negative  Negative     Results   Ultrasound Retroperitoneal 29LHT5550 11:03AM Maral Hull Crowdx Name Result Flag Reference   U/S Retroperitoneal (Report)     [facility];[address];;[place];[address]   01/05/2016 1200   01/05/2016 1230       RENAL ULTRASOUND     INDICATION- Kidney disease  COMPARISON- None  TECHNIQUE-  Ultrasound of the retroperitoneum was performed with a   curvilinear transducer utilizing volumetric sweeps and still imaging   techniques  FINDINGS-   KIDNEYS-   Right kidney- 10 9 x 4 4 cm  Normal echogenicity and contour  No suspicious masses detected  No hydronephrosis  No shadowing calculi  No perinephric fluid collections  Left kidney- 10 4 x 4 8 cm  Partial column of Aleksandar  Normal echogenicity and contour  No suspicious masses detected  No hydronephrosis  No shadowing calculi  No perinephric fluid collections  URETERS-   Nonvisualized  BLADDER-    Normally distended  No focal thickening or mass lesions  Neither ureteral jet visible  Prostate measures 5 5 x 6 1 x 4 9 cm  IMPRESSION-    Unremarkable kidneys  No hydronephrosis  Enlarged prostate  Transcribed on- [address] [facility], RAD    Reading Radiologist- CHOLO Castillo DO   Electronically Signed- CHOLO Castillo DO   Released Date Time- 01/05/16 1423   ------------------------------------------------------------------------------   9724^ROCKY VALDEZ   9724^ROCKY VALDEZ     [facility]  DMII (diabetes mellitus, type 2)   *VB-Foot Exam; every 1 year; Last 33Qvl3848; Next Due: 88Dsx7640; Active    Future Appointments    Date/Time Provider Specialty Site   05/04/2016 10:30 AM VIANCA Mcgee  Cardiology [facility]   04/14/2016 08:40 AM Jen Valdes MD Neurology [facility] [address] [place] [address]   04/21/2016 09:30 AM VIANCA Metzger   Internal Medicine [facility]  2  CT     Signatures   Electronically signed by : Crissy Jones M D ; Apr 7 2016  1:34PM EST                       (Author)

## 2018-01-17 NOTE — PROCEDURES
Procedures by Zhen Soria at 10/17/2017  5:50 AM      Author:  JOSE MIGUEL Valerio Service:  Critical Care/ICU Author Type:  Nurse Practitioner    Filed:  10/17/2017  7:30 AM Date of Service:  10/17/2017  5:50 AM Status:  Attested    :  Zhen Soria (Nurse Practitioner)  Cosigner:  Campbell Huntley MD at 10/17/2017  1:06 PM      Procedure Orders:       1  INTUBATION [[id number]] ordered by Zhen Soria at 10/17/17 1450                 Post-procedure Diagnoses:       1  Cardiac arrest [facility]) [I46 9]              Attestation signed by Campbell Huntley MD at 10/17/2017  1:06 PM      I agree with the acute need for intubation  Joleen Hussein MD                   Intubation  Date/Time: 10/17/2017 7:28 AM  Performed by: Janet Garrett by: Betsy Johnson     Patient location:  ED  Other Assisting Provider: Yes (comment)  Aleida Hou PA-C)    Consent:     Consent obtained:  Emergent situation  Universal protocol:     Patient identity confirmed:  Arm band  Pre-procedure details:     Patient status:  Unresponsive    Mallampati score:  2    Pretreatment medications:  None    Paralytics:  None  Indications:     Indications for intubation: respiratory failure and hypoxemia    Procedure details:     Preoxygenation:  Bag valve mask    CPR in progress: yes      Intubation method:  Oral    Oral intubation technique: Video laryngoscope  Laryngoscope blade:   Mac 4    Tube size (mm):  8 0    Tube type:  Cuffed and hi-lo    Number of attempts:  1    Ventilation between attempts: no      Cricoid pressure: no      Tube visualized through cords: yes    Placement assessment:     ETT to lip:  26    Tube secured with:  ETT davison    Breath sounds:  Equal    Placement verification: chest rise, condensation, CXR verification, direct visualization, equal breath sounds and ETCO2 detector      CXR findings:  ETT in proper place  Post-procedure details:     Patient tolerance of procedure: Tolerated well, no immediate complications                     Received for:Provider  EPIC   Oct 17 2017  1:07PM [facility] Standard Time

## 2018-01-17 NOTE — RESULT NOTES
Verified Results  (1) URIC ACID 01Jun2017 09:36AM Ish Mcdowell Order Number: [id number]     Test Name Result Flag Reference   URIC ACID 8 6 mg/dL H 4 2-8 0   Specimen collection should occur prior to Metamizole administration due to the potential for falsely depressed results

## 2018-01-22 VITALS — RESPIRATION RATE: 16 BRPM | SYSTOLIC BLOOD PRESSURE: 100 MMHG | DIASTOLIC BLOOD PRESSURE: 70 MMHG | HEART RATE: 78 BPM

## 2018-01-22 VITALS — TEMPERATURE: 98.1 F | BODY MASS INDEX: 28.77 KG/M2 | WEIGHT: 168.5 LBS | HEIGHT: 64 IN
